# Patient Record
Sex: MALE | Race: WHITE | NOT HISPANIC OR LATINO | URBAN - METROPOLITAN AREA
[De-identification: names, ages, dates, MRNs, and addresses within clinical notes are randomized per-mention and may not be internally consistent; named-entity substitution may affect disease eponyms.]

---

## 2021-01-15 DIAGNOSIS — Z23 NEED FOR VACCINATION: ICD-10-CM

## 2023-01-30 ENCOUNTER — INPATIENT (INPATIENT)
Facility: HOSPITAL | Age: 78
LOS: 3 days | Discharge: NURSING, REHAB OR LONG-TERM CARE | DRG: 177 | End: 2023-02-03
Attending: INTERNAL MEDICINE | Admitting: INTERNAL MEDICINE
Payer: MEDICARE

## 2023-01-30 VITALS
SYSTOLIC BLOOD PRESSURE: 144 MMHG | TEMPERATURE: 98 F | DIASTOLIC BLOOD PRESSURE: 81 MMHG | RESPIRATION RATE: 18 BRPM | WEIGHT: 175.05 LBS | HEART RATE: 74 BPM | OXYGEN SATURATION: 100 % | HEIGHT: 70 IN

## 2023-01-30 DIAGNOSIS — J44.1 CHRONIC OBSTRUCTIVE PULMONARY DISEASE WITH (ACUTE) EXACERBATION: ICD-10-CM

## 2023-01-30 LAB
ALBUMIN SERPL ELPH-MCNC: 2 G/DL — LOW (ref 3.3–5)
ALP SERPL-CCNC: 62 U/L — SIGNIFICANT CHANGE UP (ref 40–120)
ALT FLD-CCNC: 26 U/L — SIGNIFICANT CHANGE UP (ref 12–78)
ANION GAP SERPL CALC-SCNC: 7 MMOL/L — SIGNIFICANT CHANGE UP (ref 5–17)
APTT BLD: 20.4 SEC — LOW (ref 27.5–35.5)
AST SERPL-CCNC: 21 U/L — SIGNIFICANT CHANGE UP (ref 15–37)
BASE EXCESS BLDV CALC-SCNC: -0.6 MMOL/L — SIGNIFICANT CHANGE UP
BASOPHILS # BLD AUTO: 0.07 K/UL — SIGNIFICANT CHANGE UP (ref 0–0.2)
BASOPHILS NFR BLD AUTO: 1 % — SIGNIFICANT CHANGE UP (ref 0–2)
BILIRUB SERPL-MCNC: 0.2 MG/DL — SIGNIFICANT CHANGE UP (ref 0.2–1.2)
BUN SERPL-MCNC: 64 MG/DL — HIGH (ref 7–23)
CALCIUM SERPL-MCNC: 8.8 MG/DL — SIGNIFICANT CHANGE UP (ref 8.5–10.1)
CHLORIDE SERPL-SCNC: 107 MMOL/L — SIGNIFICANT CHANGE UP (ref 96–108)
CO2 BLDV-SCNC: 29 MMOL/L — HIGH (ref 22–26)
CO2 SERPL-SCNC: 27 MMOL/L — SIGNIFICANT CHANGE UP (ref 22–31)
CREAT SERPL-MCNC: 5.82 MG/DL — HIGH (ref 0.5–1.3)
EGFR: 9 ML/MIN/1.73M2 — LOW
EOSINOPHIL # BLD AUTO: 0.15 K/UL — SIGNIFICANT CHANGE UP (ref 0–0.5)
EOSINOPHIL NFR BLD AUTO: 2 % — SIGNIFICANT CHANGE UP (ref 0–6)
FLUAV AG NPH QL: SIGNIFICANT CHANGE UP
FLUBV AG NPH QL: SIGNIFICANT CHANGE UP
GAS PNL BLDV: SIGNIFICANT CHANGE UP
GLUCOSE SERPL-MCNC: 96 MG/DL — SIGNIFICANT CHANGE UP (ref 70–99)
HCO3 BLDV-SCNC: 28 MMOL/L — SIGNIFICANT CHANGE UP (ref 22–29)
HCT VFR BLD CALC: 29.7 % — LOW (ref 39–50)
HGB BLD-MCNC: 8.7 G/DL — LOW (ref 13–17)
INR BLD: 1.03 RATIO — SIGNIFICANT CHANGE UP (ref 0.88–1.16)
LYMPHOCYTES # BLD AUTO: 0.66 K/UL — LOW (ref 1–3.3)
LYMPHOCYTES # BLD AUTO: 9 % — LOW (ref 13–44)
MAGNESIUM SERPL-MCNC: 2 MG/DL — SIGNIFICANT CHANGE UP (ref 1.6–2.6)
MCHC RBC-ENTMCNC: 29.3 GM/DL — LOW (ref 32–36)
MCHC RBC-ENTMCNC: 30 PG — SIGNIFICANT CHANGE UP (ref 27–34)
MCV RBC AUTO: 102.4 FL — HIGH (ref 80–100)
MONOCYTES # BLD AUTO: 0.37 K/UL — SIGNIFICANT CHANGE UP (ref 0–0.9)
MONOCYTES NFR BLD AUTO: 5 % — SIGNIFICANT CHANGE UP (ref 2–14)
NEUTROPHILS # BLD AUTO: 5.84 K/UL — SIGNIFICANT CHANGE UP (ref 1.8–7.4)
NEUTROPHILS NFR BLD AUTO: 80 % — HIGH (ref 43–77)
NRBC # BLD: SIGNIFICANT CHANGE UP /100 WBCS (ref 0–0)
NT-PROBNP SERPL-SCNC: HIGH PG/ML (ref 0–450)
PCO2 BLDV: 60 MMHG — HIGH (ref 42–55)
PH BLDV: 7.27 — LOW (ref 7.32–7.43)
PLATELET # BLD AUTO: 269 K/UL — SIGNIFICANT CHANGE UP (ref 150–400)
PO2 BLDV: 47 MMHG — SIGNIFICANT CHANGE UP
POTASSIUM SERPL-MCNC: 3.8 MMOL/L — SIGNIFICANT CHANGE UP (ref 3.5–5.3)
POTASSIUM SERPL-SCNC: 3.8 MMOL/L — SIGNIFICANT CHANGE UP (ref 3.5–5.3)
PROCALCITONIN SERPL-MCNC: 0.83 NG/ML — HIGH (ref 0.02–0.1)
PROT SERPL-MCNC: 5.9 GM/DL — LOW (ref 6–8.3)
PROTHROM AB SERPL-ACNC: 11.9 SEC — SIGNIFICANT CHANGE UP (ref 10.5–13.4)
RBC # BLD: 2.9 M/UL — LOW (ref 4.2–5.8)
RBC # FLD: 17.7 % — HIGH (ref 10.3–14.5)
RSV RNA NPH QL NAA+NON-PROBE: SIGNIFICANT CHANGE UP
SAO2 % BLDV: 78.8 % — SIGNIFICANT CHANGE UP
SARS-COV-2 RNA SPEC QL NAA+PROBE: DETECTED
SODIUM SERPL-SCNC: 141 MMOL/L — SIGNIFICANT CHANGE UP (ref 135–145)
TROPONIN I, HIGH SENSITIVITY RESULT: 25.88 NG/L — SIGNIFICANT CHANGE UP
WBC # BLD: 7.3 K/UL — SIGNIFICANT CHANGE UP (ref 3.8–10.5)
WBC # FLD AUTO: 7.3 K/UL — SIGNIFICANT CHANGE UP (ref 3.8–10.5)

## 2023-01-30 PROCEDURE — 85610 PROTHROMBIN TIME: CPT

## 2023-01-30 PROCEDURE — 97116 GAIT TRAINING THERAPY: CPT | Mod: GP

## 2023-01-30 PROCEDURE — 97163 PT EVAL HIGH COMPLEX 45 MIN: CPT | Mod: GP

## 2023-01-30 PROCEDURE — 99223 1ST HOSP IP/OBS HIGH 75: CPT

## 2023-01-30 PROCEDURE — 93010 ELECTROCARDIOGRAM REPORT: CPT

## 2023-01-30 PROCEDURE — 93970 EXTREMITY STUDY: CPT

## 2023-01-30 PROCEDURE — 93005 ELECTROCARDIOGRAM TRACING: CPT

## 2023-01-30 PROCEDURE — 71275 CT ANGIOGRAPHY CHEST: CPT

## 2023-01-30 PROCEDURE — 94640 AIRWAY INHALATION TREATMENT: CPT

## 2023-01-30 PROCEDURE — 99285 EMERGENCY DEPT VISIT HI MDM: CPT | Mod: CS

## 2023-01-30 PROCEDURE — 71045 X-RAY EXAM CHEST 1 VIEW: CPT | Mod: 26

## 2023-01-30 PROCEDURE — 90935 HEMODIALYSIS ONE EVALUATION: CPT

## 2023-01-30 PROCEDURE — 71045 X-RAY EXAM CHEST 1 VIEW: CPT

## 2023-01-30 PROCEDURE — 85730 THROMBOPLASTIN TIME PARTIAL: CPT

## 2023-01-30 PROCEDURE — 87635 SARS-COV-2 COVID-19 AMP PRB: CPT

## 2023-01-30 PROCEDURE — 85379 FIBRIN DEGRADATION QUANT: CPT

## 2023-01-30 PROCEDURE — 36415 COLL VENOUS BLD VENIPUNCTURE: CPT

## 2023-01-30 PROCEDURE — 87040 BLOOD CULTURE FOR BACTERIA: CPT

## 2023-01-30 PROCEDURE — 80069 RENAL FUNCTION PANEL: CPT

## 2023-01-30 PROCEDURE — 80048 BASIC METABOLIC PNL TOTAL CA: CPT

## 2023-01-30 PROCEDURE — 85027 COMPLETE CBC AUTOMATED: CPT

## 2023-01-30 RX ORDER — IPRATROPIUM/ALBUTEROL SULFATE 18-103MCG
3 AEROSOL WITH ADAPTER (GRAM) INHALATION ONCE
Refills: 0 | Status: COMPLETED | OUTPATIENT
Start: 2023-01-30 | End: 2023-01-30

## 2023-01-30 RX ORDER — HEPARIN SODIUM 5000 [USP'U]/ML
5000 INJECTION INTRAVENOUS; SUBCUTANEOUS EVERY 12 HOURS
Refills: 0 | Status: DISCONTINUED | OUTPATIENT
Start: 2023-01-30 | End: 2023-02-01

## 2023-01-30 RX ORDER — ACETAMINOPHEN 500 MG
650 TABLET ORAL EVERY 6 HOURS
Refills: 0 | Status: DISCONTINUED | OUTPATIENT
Start: 2023-01-30 | End: 2023-02-03

## 2023-01-30 RX ORDER — MAGNESIUM SULFATE 500 MG/ML
1 VIAL (ML) INJECTION ONCE
Refills: 0 | Status: COMPLETED | OUTPATIENT
Start: 2023-01-30 | End: 2023-01-30

## 2023-01-30 RX ORDER — METOPROLOL TARTRATE 50 MG
100 TABLET ORAL DAILY
Refills: 0 | Status: DISCONTINUED | OUTPATIENT
Start: 2023-01-30 | End: 2023-02-03

## 2023-01-30 RX ORDER — ONDANSETRON 8 MG/1
4 TABLET, FILM COATED ORAL EVERY 8 HOURS
Refills: 0 | Status: DISCONTINUED | OUTPATIENT
Start: 2023-01-30 | End: 2023-02-03

## 2023-01-30 RX ORDER — MIRTAZAPINE 45 MG/1
7.5 TABLET, ORALLY DISINTEGRATING ORAL AT BEDTIME
Refills: 0 | Status: DISCONTINUED | OUTPATIENT
Start: 2023-01-30 | End: 2023-02-03

## 2023-01-30 RX ORDER — LEVOTHYROXINE SODIUM 125 MCG
25 TABLET ORAL DAILY
Refills: 0 | Status: DISCONTINUED | OUTPATIENT
Start: 2023-01-30 | End: 2023-02-03

## 2023-01-30 RX ORDER — CEFTRIAXONE 500 MG/1
1000 INJECTION, POWDER, FOR SOLUTION INTRAMUSCULAR; INTRAVENOUS ONCE
Refills: 0 | Status: DISCONTINUED | OUTPATIENT
Start: 2023-01-30 | End: 2023-01-30

## 2023-01-30 RX ORDER — HYDRALAZINE HCL 50 MG
1 TABLET ORAL
Qty: 0 | Refills: 0 | DISCHARGE

## 2023-01-30 RX ORDER — TAMSULOSIN HYDROCHLORIDE 0.4 MG/1
0.4 CAPSULE ORAL AT BEDTIME
Refills: 0 | Status: DISCONTINUED | OUTPATIENT
Start: 2023-01-30 | End: 2023-02-03

## 2023-01-30 RX ORDER — CEFEPIME 1 G/1
1000 INJECTION, POWDER, FOR SOLUTION INTRAMUSCULAR; INTRAVENOUS ONCE
Refills: 0 | Status: DISCONTINUED | OUTPATIENT
Start: 2023-01-30 | End: 2023-01-30

## 2023-01-30 RX ORDER — VANCOMYCIN HCL 1 G
1250 VIAL (EA) INTRAVENOUS ONCE
Refills: 0 | Status: COMPLETED | OUTPATIENT
Start: 2023-01-30 | End: 2023-01-30

## 2023-01-30 RX ORDER — AZITHROMYCIN 500 MG/1
500 TABLET, FILM COATED ORAL ONCE
Refills: 0 | Status: COMPLETED | OUTPATIENT
Start: 2023-01-30 | End: 2023-01-30

## 2023-01-30 RX ORDER — ATORVASTATIN CALCIUM 80 MG/1
40 TABLET, FILM COATED ORAL AT BEDTIME
Refills: 0 | Status: DISCONTINUED | OUTPATIENT
Start: 2023-01-30 | End: 2023-02-03

## 2023-01-30 RX ORDER — CITALOPRAM 10 MG/1
20 TABLET, FILM COATED ORAL DAILY
Refills: 0 | Status: DISCONTINUED | OUTPATIENT
Start: 2023-01-30 | End: 2023-02-03

## 2023-01-30 RX ORDER — LEVOTHYROXINE SODIUM 125 MCG
1 TABLET ORAL
Qty: 0 | Refills: 0 | DISCHARGE

## 2023-01-30 RX ORDER — FLUTICASONE FUROATE AND VILANTEROL TRIFENATATE 100; 25 UG/1; UG/1
1 POWDER RESPIRATORY (INHALATION)
Qty: 0 | Refills: 0 | DISCHARGE

## 2023-01-30 RX ORDER — CITALOPRAM 10 MG/1
1 TABLET, FILM COATED ORAL
Qty: 0 | Refills: 0 | DISCHARGE

## 2023-01-30 RX ORDER — CEFEPIME 1 G/1
1000 INJECTION, POWDER, FOR SOLUTION INTRAMUSCULAR; INTRAVENOUS ONCE
Refills: 0 | Status: COMPLETED | OUTPATIENT
Start: 2023-01-30 | End: 2023-01-30

## 2023-01-30 RX ORDER — ALBUTEROL 90 UG/1
2 AEROSOL, METERED ORAL
Qty: 0 | Refills: 0 | DISCHARGE

## 2023-01-30 RX ORDER — PANTOPRAZOLE SODIUM 20 MG/1
40 TABLET, DELAYED RELEASE ORAL
Refills: 0 | Status: DISCONTINUED | OUTPATIENT
Start: 2023-01-30 | End: 2023-02-03

## 2023-01-30 RX ORDER — TIOTROPIUM BROMIDE 18 UG/1
2 CAPSULE ORAL; RESPIRATORY (INHALATION) DAILY
Refills: 0 | Status: DISCONTINUED | OUTPATIENT
Start: 2023-01-30 | End: 2023-02-03

## 2023-01-30 RX ORDER — ALBUTEROL 90 UG/1
2 AEROSOL, METERED ORAL EVERY 6 HOURS
Refills: 0 | Status: DISCONTINUED | OUTPATIENT
Start: 2023-01-30 | End: 2023-02-03

## 2023-01-30 RX ORDER — HYDRALAZINE HCL 50 MG
50 TABLET ORAL THREE TIMES A DAY
Refills: 0 | Status: DISCONTINUED | OUTPATIENT
Start: 2023-01-30 | End: 2023-02-03

## 2023-01-30 RX ORDER — LANOLIN ALCOHOL/MO/W.PET/CERES
3 CREAM (GRAM) TOPICAL AT BEDTIME
Refills: 0 | Status: DISCONTINUED | OUTPATIENT
Start: 2023-01-30 | End: 2023-02-03

## 2023-01-30 RX ORDER — TIOTROPIUM BROMIDE 18 UG/1
1 CAPSULE ORAL; RESPIRATORY (INHALATION)
Qty: 0 | Refills: 0 | DISCHARGE

## 2023-01-30 RX ORDER — PANTOPRAZOLE SODIUM 20 MG/1
1 TABLET, DELAYED RELEASE ORAL
Qty: 0 | Refills: 0 | DISCHARGE

## 2023-01-30 RX ADMIN — CEFEPIME 1000 MILLIGRAM(S): 1 INJECTION, POWDER, FOR SOLUTION INTRAMUSCULAR; INTRAVENOUS at 13:29

## 2023-01-30 RX ADMIN — Medication 100 GRAM(S): at 11:02

## 2023-01-30 RX ADMIN — AZITHROMYCIN 255 MILLIGRAM(S): 500 TABLET, FILM COATED ORAL at 13:30

## 2023-01-30 RX ADMIN — Medication 125 MILLIGRAM(S): at 11:02

## 2023-01-30 RX ADMIN — HEPARIN SODIUM 5000 UNIT(S): 5000 INJECTION INTRAVENOUS; SUBCUTANEOUS at 21:08

## 2023-01-30 RX ADMIN — ATORVASTATIN CALCIUM 40 MILLIGRAM(S): 80 TABLET, FILM COATED ORAL at 21:08

## 2023-01-30 RX ADMIN — Medication 3 MILLIGRAM(S): at 23:00

## 2023-01-30 RX ADMIN — MIRTAZAPINE 7.5 MILLIGRAM(S): 45 TABLET, ORALLY DISINTEGRATING ORAL at 23:00

## 2023-01-30 RX ADMIN — Medication 3 MILLILITER(S): at 11:45

## 2023-01-30 RX ADMIN — Medication 3 MILLILITER(S): at 11:02

## 2023-01-30 RX ADMIN — Medication 3 MILLILITER(S): at 11:25

## 2023-01-30 RX ADMIN — TAMSULOSIN HYDROCHLORIDE 0.4 MILLIGRAM(S): 0.4 CAPSULE ORAL at 23:00

## 2023-01-30 NOTE — ED PROVIDER NOTE - NSICDXPASTMEDICALHX_GEN_ALL_CORE_FT
PAST MEDICAL HISTORY:  CAD (coronary artery disease)     COPD (chronic obstructive pulmonary disease)     ESRD on dialysis

## 2023-01-30 NOTE — ED PROVIDER NOTE - PHYSICAL EXAMINATION
GENERAL: non-toxic appearing, in NAD  HEAD: atraumatic, normocephalic  EYES: vision grossly intact, no conjunctivitis or discharge  EARS: hearing grossly intact  NOSE: no nasal discharge, epistaxis   CARDIAC: RRR, normal S1S2,  no appreciable murmurs, no cyanosis, cap refill < 2 seconds  PULM: no respiratory distress, oxygen saturation on RA wnl, no crackles, rales, rhonchi, or wheezing. Poor movement of air.  GI: abdomen nondistended, soft, nontender, no guarding or rebound tenderness, no palpable masses  NEURO: awake and alert, follows commands, normal speech, PERRLA, EOMI, no focal motor or sensory deficits, normal gait  MSK: spine appears normal, no joint swelling or erythema, no gross deformities of extremities  EXT: no peripheral edema, calf tenderness, redness or swelling  SKIN: warm, dry, and intact, no rashes  PSYCH: appropriate mood and affect

## 2023-01-30 NOTE — H&P ADULT - NSHPPHYSICALEXAM_GEN_ALL_CORE
Vital Signs Last 24 Hrs  T(C): 36.4 (30 Jan 2023 10:17), Max: 36.4 (30 Jan 2023 10:17)  T(F): 97.5 (30 Jan 2023 10:17), Max: 97.5 (30 Jan 2023 10:17)  HR: 74 (30 Jan 2023 10:17) (74 - 74)  BP: 144/81 (30 Jan 2023 10:17) (144/81 - 144/81)  BP(mean): --  RR: 18 (30 Jan 2023 10:17) (18 - 18)  SpO2: 100% (30 Jan 2023 10:17) (100% - 100%)    Parameters below as of 30 Jan 2023 10:17  Patient On (Oxygen Delivery Method): mask, nonrebreather  O2 Flow (L/min): 8    PHYSICAL EXAM:      Constitutional: NAD  Eyes: perrl, no conjunctival changes  ENMT: no exudates, moist oral muc, uvula midline  Neck: no JVD, no LAD  Back: no cva tenderness  Respiratory: CTA, + rhonci, bibasilar crackles  Cardiovascular: S1S2 reg, no murmur gallop or rub  Gastrointestinal: abd soft, NT/ND + BS  Genitourinary: voiding  Extremities: FROM, no joint effusions, no edema, no clubbing , no cyanosis  Vascular: pedal pulses + bilateral, warm extremities  Neurological: non focal, mot str 5/5/ all extr  Skin: no rashes  Lymph Nodes: no LAD

## 2023-01-30 NOTE — PHARMACOTHERAPY INTERVENTION NOTE - COMMENTS
Medication history complete, patient is from [facility], reviewed and confirmed medication with list provided by facility.

## 2023-01-30 NOTE — ED PROVIDER NOTE - OBJECTIVE STATEMENT
78 y/o male with a PMHx of CAD s/p stents x4, COPD, ESRD on dialysis M/W/F presents to the ED [place] from [facility] c/o SOB starting this morning. +runny nose. Denies cough, CP, fevers, chills. Former smoker. No other complaints at this time.

## 2023-01-30 NOTE — H&P ADULT - ASSESSMENT
* SOB multifactorial ESRD, COVID, pneumonia  s/p IV broad spectrum  for HD today consult nephrology  ID to advise on covid Rx if any    * COPD  on home O2  Albuterol  Pulm consult  he was tapered off roids last dose was supposed to be 2/1; consult pulm    * HTN  on high dose BB at home, hydralazine    dnr dni

## 2023-01-30 NOTE — ED PROVIDER NOTE - NS ED ROS FT
GENERAL: no fever, chills, fatigue, weight loss, night sweats  HEENT: no eye pain, discharge, conjunctivitis, ear pain, hearing loss, congestion, throat pain. +runny nose  CARDIAC: no chest pain, palpitations, lightheadedness, syncope  PULM: no wheezing. +SOB  GI: no abdominal pain, nausea, vomiting, diarrhea, constipation, melena, hematochezia  : no urinary dysuria, frequency, incontinence, hematuria  NEURO: no headache, changes in vision, motor weakness, sensory changes  MSK: no joint pain, joint swelling, myalgias  SKIN: no rashes  HEME: no active bleeding, excessive bruising

## 2023-01-30 NOTE — H&P ADULT - HISTORY OF PRESENT ILLNESS
76 y/o male with a PMHx of CAD s/p stents x4, COPD, ESRD on dialysis M/W/F presents to the ED BIBA from [facility] c/o SOB starting this morning. +runny nose. Denies cough, CP, fevers, chills. Former smoker and with O2 at the NH. He has abnormal CXR and tested + for covid, received broad spectrum abx in ed; adm for further Rx. MOLST done dnr dni

## 2023-01-30 NOTE — ED ADULT NURSE NOTE - CHIEF COMPLAINT QUOTE
patient brought in by EMS from [facility] c/o shortness of breath.  reports symptoms started this morning.  recently hospitalized for pneumonia.  hx ESRD - M/W/F, COPD.

## 2023-01-30 NOTE — CONSULT NOTE ADULT - SUBJECTIVE AND OBJECTIVE BOX
NEPHROLOGY INTERVAL HPI/OVERNIGHT EVENTS:  JULIO [id number]  HPI:  76 y/o male with a PMHx of CAD s/p stents x4, COPD, ESRD on dialysis M/W/F presents to the ED [place] from [facility] c/o SOB starting this morning. +runny nose. Denies cough, CP, fevers, chills. Former smoker and with O2 at the NH. He has abnormal CXR and tested + for covid, received broad spectrum abx in ed; adm for further Rx. MOLST done dnr dni (30 Jan 2023 13:09)      Patient is a 77y old  Male who presents with a chief complaint of sob (30 Jan 2023 13:09)  ----  pt poor historian   has hx ofesrd for past 8 years ( doesnot know his nephro) hd at [facility]   recent admission to hospt for Suicide attempt with attempt of cuttting his AVF site  now hd via TDC   send from [facility] with sob  with hx of severe copd on o2       PAST MEDICAL & SURGICAL HISTORY:  -esrd mwf [facility] unit now tdc   -suicide attempt with cutting of avf site  - COPD  - CAD            FAMILY HISTORY:      MEDICATIONS  (STANDING):  atorvastatin 40 milliGRAM(s) Oral at bedtime  azithromycin  IVPB 500 milliGRAM(s) IV Intermittent once  cefepime  Injectable. 1000 milliGRAM(s) IV Push once  citalopram 20 milliGRAM(s) Oral daily  heparin   Injectable 5000 Unit(s) SubCutaneous every 12 hours  hydrALAZINE 50 milliGRAM(s) Oral three times a day  levothyroxine 25 MICROGram(s) Oral daily  metoprolol succinate  milliGRAM(s) Oral daily  mirtazapine 7.5 milliGRAM(s) Oral at bedtime  pantoprazole    Tablet 40 milliGRAM(s) Oral before breakfast  predniSONE   Tablet 10 milliGRAM(s) Oral daily  tamsulosin 0.4 milliGRAM(s) Oral at bedtime  tiotropium 2.5 MICROgram(s) Inhaler 2 Puff(s) Inhalation daily  vancomycin  IVPB. 1250 milliGRAM(s) IV Intermittent once    MEDICATIONS  (PRN):  acetaminophen     Tablet .. 650 milliGRAM(s) Oral every 6 hours PRN Temp greater or equal to 38C (100.4F), Mild Pain (1 - 3)  albuterol    90 MICROgram(s) HFA Inhaler 2 Puff(s) Inhalation every 6 hours PRN for shortness of breath and/or wheezing  aluminum hydroxide/magnesium hydroxide/simethicone Suspension 30 milliLiter(s) Oral every 4 hours PRN Dyspepsia  melatonin 3 milliGRAM(s) Oral at bedtime PRN Insomnia  ondansetron Injectable 4 milliGRAM(s) IV Push every 8 hours PRN Nausea and/or Vomiting      Allergies    No Known Allergies    Intolerances        I&O's Summary      Home Medications:  Albuterol (Eqv-Proventil HFA) 90 mcg/inh inhalation aerosol: 2 puff(s) inhaled every 6 hours, As Needed - for shortness of breath and/or wheezing (30 Jan 2023 12:02)  atorvastatin 40 mg oral tablet: 1 tab(s) orally once a day (in the evening) (30 Jan 2023 12:02)  bisacodyl 10 mg rectal suppository: 1 suppository(ies) rectally once a day, As Needed if no results from MOM (30 Jan 2023 12:02)  Breo Ellipta 200 mcg-25 mcg/inh inhalation powder: 1 puff(s) inhaled once a day (30 Jan 2023 12:02)  citalopram 20 mg oral tablet: 1 tab(s) orally once a day (30 Jan 2023 12:02)  Fleet Enema 19 g-7 g rectal enema: 133 milliliter(s) rectal once if no results from Dulcolax (30 Jan 2023 12:02)  hydrALAZINE 50 mg oral tablet: 1 tab(s) orally 3 times a day (30 Jan 2023 12:02)  levothyroxine 25 mcg (0.025 mg) oral tablet: 1 tab(s) orally once a day (30 Jan 2023 12:02)  Metoprolol Succinate  mg oral tablet, extended release: 1 tab(s) orally once a day (30 Jan 2023 12:02)  Milk of Magnesia 8% oral suspension: 30 milliliter(s) orally once a day (at bedtime), As Needed - for constipation (30 Jan 2023 12:02)  mirtazapine 7.5 mg oral tablet: 1 tab(s) orally once a day (at bedtime) (30 Jan 2023 12:02)  Multiple Vitamins oral tablet: 1 tab(s) orally once a day (30 Jan 2023 12:02)  pantoprazole 40 mg oral delayed release tablet: 1 tab(s) orally once a day (30 Jan 2023 12:02)  predniSONE 10 mg oral tablet: 1 tab(s) orally once a day until 2/1 (30 Jan 2023 12:02)  Spiriva 18 mcg inhalation capsule: 1 cap(s) inhaled once a day (30 Jan 2023 12:02)  tamsulosin 0.4 mg oral capsule: 1 cap(s) orally once a day (at bedtime) (30 Jan 2023 12:02)      Patient was seen and evaluated on dialysis.   Patient is tolerating the procedure well.   Continue dialysis:   Dialyzer:          QB:        QD:   Goal UF ___ over ___ Hours       Vital Signs Last 24 Hrs  T(C): 36.4 (30 Jan 2023 10:17), Max: 36.4 (30 Jan 2023 10:17)  T(F): 97.5 (30 Jan 2023 10:17), Max: 97.5 (30 Jan 2023 10:17)  HR: 74 (30 Jan 2023 10:17) (74 - 74)  BP: 144/81 (30 Jan 2023 10:17) (144/81 - 144/81)  BP(mean): --  RR: 18 (30 Jan 2023 10:17) (18 - 18)  SpO2: 100% (30 Jan 2023 10:17) (100% - 100%)    Parameters below as of 30 Jan 2023 10:17  Patient On (Oxygen Delivery Method): mask, nonrebreather  O2 Flow (L/min): 8    Daily Height in cm: 177.8 (30 Jan 2023 10:17)    Daily   I&O's Summary      PHYSICAL EXAM:  GEN: alert awake NAD  HEENT: MMM  NECK supple no jvd  CV: RRR s1s2  LUNGS: b/l CTA  ABD: + bs soft,   EXT: no edema    LABS:                        8.7    7.30  )-----------( 269      ( 30 Jan 2023 10:43 )             29.7     01-30    141  |  107  |  64<H>  ----------------------------<  96  3.8   |  27  |  5.82<H>    Ca    8.8      30 Jan 2023 10:43  Phos  3.7     01-30  Mg     2.0     01-30    TPro  5.9<L>  /  Alb  2.0<L>  /  TBili  0.2  /  DBili  x   /  AST  21  /  ALT  26  /  AlkPhos  62  01-30    PT/INR - ( 30 Jan 2023 10:43 )   PT: 11.9 sec;   INR: 1.03 ratio         PTT - ( 30 Jan 2023 10:43 )  PTT:20.4 sec    Magnesium, Serum: 2.0 mg/dL (01-30 @ 10:43)  Phosphorus Level, Serum: 3.7 mg/dL (01-30 @ 10:43)

## 2023-01-30 NOTE — ED ADULT TRIAGE NOTE - CHIEF COMPLAINT QUOTE
patient brought in by EMS from [facility] c/o shortness of breath.  reports symptoms started this morning.  recently hospitalized for pneumonia.  hx ESRD - M/W/F, COPD.

## 2023-01-30 NOTE — ED ADULT NURSE NOTE - OBJECTIVE STATEMENT
patient brought in by EMS from [facility] c/o shortness of breath.  reports symptoms started this morning.  recently hospitalized for pneumonia.  hx ESRD - M/W/F, COPD. Pt

## 2023-01-30 NOTE — ED PROVIDER NOTE - NSCAREINITIATED _GEN_ER
What Type Of Note Output Would You Prefer (Optional)?: Bullet Format How Severe Are Your Spot(S)?: mild Have Your Spot(S) Been Treated In The Past?: has not been treated Hpi Title: Evaluation of Skin Lesions Nicanor Fisher(Attending)

## 2023-01-30 NOTE — CONSULT NOTE ADULT - ASSESSMENT
76 y/o male with a PMHx of CAD s/p stents x4, COPD, ESRD on dialysis M/W/F presents to the ED BIBA from [facility] c/o SOB starting this morning. +runny nose. Denies cough, CP, fevers, chills. Former smoker and with O2 at the   esrd mwf at [place] unit   cxr with CHF admitted with COPD exaceb with hx of severe COPD on o2 at home   hx of suicide attempt with cutting of avf recently   covid pos    REC  - hd today for 3.5 hrs via tdc with inc uf with hd   - anemia nehemiah with hd

## 2023-01-30 NOTE — H&P ADULT - NSHPLABSRESULTS_GEN_ALL_CORE
8.7    7.30  )-----------( 269      ( 30 Jan 2023 10:43 )             29.7   01-30    141  |  107  |  64<H>  ----------------------------<  96  3.8   |  27  |  5.82<H>    Ca    8.8      30 Jan 2023 10:43  Phos  3.7     01-30  Mg     2.0     01-30    TPro  5.9<L>  /  Alb  2.0<L>  /  TBili  0.2  /  DBili  x   /  AST  21  /  ALT  26  /  AlkPhos  62  01-30      no ekg  cxr bibasilar infiltrates

## 2023-01-30 NOTE — ED PROVIDER NOTE - CLINICAL SUMMARY MEDICAL DECISION MAKING FREE TEXT BOX
Concern for COPD exacerbation. Will give nebs, steroids, hold off on abx as pt afebrile. Pt likely to be admitted.

## 2023-01-31 LAB
-  COAGULASE NEGATIVE STAPHYLOCOCCUS: SIGNIFICANT CHANGE UP
ANION GAP SERPL CALC-SCNC: 4 MMOL/L — LOW (ref 5–17)
BUN SERPL-MCNC: 40 MG/DL — HIGH (ref 7–23)
CALCIUM SERPL-MCNC: 8.7 MG/DL — SIGNIFICANT CHANGE UP (ref 8.5–10.1)
CHLORIDE SERPL-SCNC: 105 MMOL/L — SIGNIFICANT CHANGE UP (ref 96–108)
CO2 SERPL-SCNC: 28 MMOL/L — SIGNIFICANT CHANGE UP (ref 22–31)
CREAT SERPL-MCNC: 3.75 MG/DL — HIGH (ref 0.5–1.3)
D DIMER BLD IA.RAPID-MCNC: 2483 NG/ML DDU — HIGH
EGFR: 16 ML/MIN/1.73M2 — LOW
GLUCOSE SERPL-MCNC: 107 MG/DL — HIGH (ref 70–99)
GRAM STN FLD: SIGNIFICANT CHANGE UP
HAV IGM SER-ACNC: SIGNIFICANT CHANGE UP
HBV CORE IGM SER-ACNC: SIGNIFICANT CHANGE UP
HBV SURFACE AG SER-ACNC: SIGNIFICANT CHANGE UP
HCT VFR BLD CALC: 31.4 % — LOW (ref 39–50)
HCV AB S/CO SERPL IA: 0.09 S/CO — SIGNIFICANT CHANGE UP (ref 0–0.99)
HCV AB SERPL-IMP: SIGNIFICANT CHANGE UP
HGB BLD-MCNC: 9.1 G/DL — LOW (ref 13–17)
MANUAL SMEAR VERIFICATION: SIGNIFICANT CHANGE UP
MCHC RBC-ENTMCNC: 29 GM/DL — LOW (ref 32–36)
MCHC RBC-ENTMCNC: 29.7 PG — SIGNIFICANT CHANGE UP (ref 27–34)
MCV RBC AUTO: 102.6 FL — HIGH (ref 80–100)
METHOD TYPE: SIGNIFICANT CHANGE UP
PLAT MORPH BLD: SIGNIFICANT CHANGE UP
PLATELET # BLD AUTO: 253 K/UL — SIGNIFICANT CHANGE UP (ref 150–400)
POTASSIUM SERPL-MCNC: 4.6 MMOL/L — SIGNIFICANT CHANGE UP (ref 3.5–5.3)
POTASSIUM SERPL-SCNC: 4.6 MMOL/L — SIGNIFICANT CHANGE UP (ref 3.5–5.3)
RBC # BLD: 3.06 M/UL — LOW (ref 4.2–5.8)
RBC # FLD: 18.1 % — HIGH (ref 10.3–14.5)
RBC BLD AUTO: SIGNIFICANT CHANGE UP
SODIUM SERPL-SCNC: 137 MMOL/L — SIGNIFICANT CHANGE UP (ref 135–145)
WBC # BLD: 6.66 K/UL — SIGNIFICANT CHANGE UP (ref 3.8–10.5)
WBC # FLD AUTO: 6.66 K/UL — SIGNIFICANT CHANGE UP (ref 3.8–10.5)

## 2023-01-31 PROCEDURE — 99233 SBSQ HOSP IP/OBS HIGH 50: CPT

## 2023-01-31 RX ORDER — BUDESONIDE AND FORMOTEROL FUMARATE DIHYDRATE 160; 4.5 UG/1; UG/1
2 AEROSOL RESPIRATORY (INHALATION)
Refills: 0 | Status: DISCONTINUED | OUTPATIENT
Start: 2023-01-31 | End: 2023-02-03

## 2023-01-31 RX ADMIN — Medication 3 MILLIGRAM(S): at 21:45

## 2023-01-31 RX ADMIN — HEPARIN SODIUM 5000 UNIT(S): 5000 INJECTION INTRAVENOUS; SUBCUTANEOUS at 11:11

## 2023-01-31 RX ADMIN — CITALOPRAM 20 MILLIGRAM(S): 10 TABLET, FILM COATED ORAL at 11:11

## 2023-01-31 RX ADMIN — Medication 10 MILLIGRAM(S): at 11:11

## 2023-01-31 RX ADMIN — Medication 25 MICROGRAM(S): at 06:17

## 2023-01-31 RX ADMIN — HEPARIN SODIUM 5000 UNIT(S): 5000 INJECTION INTRAVENOUS; SUBCUTANEOUS at 21:44

## 2023-01-31 RX ADMIN — PANTOPRAZOLE SODIUM 40 MILLIGRAM(S): 20 TABLET, DELAYED RELEASE ORAL at 06:17

## 2023-01-31 RX ADMIN — ATORVASTATIN CALCIUM 40 MILLIGRAM(S): 80 TABLET, FILM COATED ORAL at 21:44

## 2023-01-31 RX ADMIN — Medication 50 MILLIGRAM(S): at 06:17

## 2023-01-31 RX ADMIN — Medication 50 MILLIGRAM(S): at 15:00

## 2023-01-31 RX ADMIN — TAMSULOSIN HYDROCHLORIDE 0.4 MILLIGRAM(S): 0.4 CAPSULE ORAL at 21:44

## 2023-01-31 RX ADMIN — Medication 100 MILLIGRAM(S): at 11:11

## 2023-01-31 RX ADMIN — MIRTAZAPINE 7.5 MILLIGRAM(S): 45 TABLET, ORALLY DISINTEGRATING ORAL at 21:45

## 2023-01-31 NOTE — PROVIDER CONTACT NOTE (CRITICAL VALUE NOTIFICATION) - SITUATION
message left. awaiting return call message left. awaiting return call. [address]IE Henson returned call. Made aware of blood culture results.  Will review chart. Await orders

## 2023-01-31 NOTE — DIETITIAN NUTRITION RISK NOTIFICATION - TREATMENT: THE FOLLOWING DIET HAS BEEN RECOMMENDED
Diet, Renal Restrictions:   For patients receiving Renal Replacement - No Protein Restr, No Conc K, No Conc Phos, Low Sodium (01-30-23 @ 23:03) [Active]

## 2023-01-31 NOTE — DIETITIAN INITIAL EVALUATION ADULT - NS FNS DIET ORDER
Diet, Renal Restrictions:   For patients receiving Renal Replacement - No Protein Restr, No Conc K, No Conc Phos, Low Sodium (01-30-23 @ 23:03)

## 2023-01-31 NOTE — CONSULT NOTE ADULT - ASSESSMENT
76 y/o male with h/o CAD s/p stents x 4, COPD on home O2, ESRD on dialysis M/W/F was admitted on 1/30 from [facility] for worsening SOB x one day associated with runny nose. Denies fevers, chills. In ER she tested positive for COVID and received broad spectrum IV abx.     1. Acute on chronic respiratory failure. Multifocal pneumonia. CHF exacerbation. ESRD on HD. COPD exacerbation.  -s/p cefepime and azithromycin in ER  -respiratory frail  -she is not eligible for remdesivir therapy due to poor renal function  -no need for abx therpay at present time  -O2 therapy  -steroids  -AC  -droplet isolation  -respiratory care  -old chart reviewed to assess prior cultures  -monitor temps  -f/u CBC  -supportive care  2. Other issues:   -care per medicine

## 2023-01-31 NOTE — CHART NOTE - NSCHARTNOTEFT_GEN_A_CORE
Called by RN to report positive blood culture: Gram positive cocci in clusters in aerobic bottle   Will repeat blood cultures  Vanco x 1 dose  ID consult for antibiotic management Called by RN to report positive blood culture: Gram positive cocci in clusters in aerobic bottle   Will repeat blood cultures- ? contamination   Already had Zithro, Cefepime and Vanco x 1  Will consult ID for antibiotic management

## 2023-01-31 NOTE — DIETITIAN INITIAL EVALUATION ADULT - PERTINENT MEDS FT
MEDICATIONS  (STANDING):  atorvastatin 40 milliGRAM(s) Oral at bedtime  citalopram 20 milliGRAM(s) Oral daily  heparin   Injectable 5000 Unit(s) SubCutaneous every 12 hours  hydrALAZINE 50 milliGRAM(s) Oral three times a day  levothyroxine 25 MICROGram(s) Oral daily  metoprolol succinate  milliGRAM(s) Oral daily  mirtazapine 7.5 milliGRAM(s) Oral at bedtime  pantoprazole    Tablet 40 milliGRAM(s) Oral before breakfast  predniSONE   Tablet 10 milliGRAM(s) Oral daily  tamsulosin 0.4 milliGRAM(s) Oral at bedtime  tiotropium 2.5 MICROgram(s) Inhaler 2 Puff(s) Inhalation daily    MEDICATIONS  (PRN):  acetaminophen     Tablet .. 650 milliGRAM(s) Oral every 6 hours PRN Temp greater or equal to 38C (100.4F), Mild Pain (1 - 3)  albuterol    90 MICROgram(s) HFA Inhaler 2 Puff(s) Inhalation every 6 hours PRN for shortness of breath and/or wheezing  aluminum hydroxide/magnesium hydroxide/simethicone Suspension 30 milliLiter(s) Oral every 4 hours PRN Dyspepsia  melatonin 3 milliGRAM(s) Oral at bedtime PRN Insomnia  ondansetron Injectable 4 milliGRAM(s) IV Push every 8 hours PRN Nausea and/or Vomiting    Home Medications:  Albuterol (Eqv-Proventil HFA) 90 mcg/inh inhalation aerosol: 2 puff(s) inhaled every 6 hours, As Needed - for shortness of breath and/or wheezing (30 Jan 2023 12:02)  atorvastatin 40 mg oral tablet: 1 tab(s) orally once a day (in the evening) (30 Jan 2023 12:02)  bisacodyl 10 mg rectal suppository: 1 suppository(ies) rectally once a day, As Needed if no results from MOM (30 Jan 2023 12:02)  Breo Ellipta 200 mcg-25 mcg/inh inhalation powder: 1 puff(s) inhaled once a day (30 Jan 2023 12:02)  citalopram 20 mg oral tablet: 1 tab(s) orally once a day (30 Jan 2023 12:02)  Fleet Enema 19 g-7 g rectal enema: 133 milliliter(s) rectal once if no results from Dulcolax (30 Jan 2023 12:02)  hydrALAZINE 50 mg oral tablet: 1 tab(s) orally 3 times a day (30 Jan 2023 12:02)  levothyroxine 25 mcg (0.025 mg) oral tablet: 1 tab(s) orally once a day (30 Jan 2023 12:02)  Metoprolol Succinate  mg oral tablet, extended release: 1 tab(s) orally once a day (30 Jan 2023 12:02)  Milk of Magnesia 8% oral suspension: 30 milliliter(s) orally once a day (at bedtime), As Needed - for constipation (30 Jan 2023 12:02)  mirtazapine 7.5 mg oral tablet: 1 tab(s) orally once a day (at bedtime) (30 Jan 2023 12:02)  Multiple Vitamins oral tablet: 1 tab(s) orally once a day (30 Jan 2023 12:02)  pantoprazole 40 mg oral delayed release tablet: 1 tab(s) orally once a day (30 Jan 2023 12:02)  predniSONE 10 mg oral tablet: 1 tab(s) orally once a day until 2/1 (30 Jan 2023 12:02)  Spiriva 18 mcg inhalation capsule: 1 cap(s) inhaled once a day (30 Jan 2023 12:02)  tamsulosin 0.4 mg oral capsule: 1 cap(s) orally once a day (at bedtime) (30 Jan 2023 12:02)

## 2023-01-31 NOTE — CONSULT NOTE ADULT - SUBJECTIVE AND OBJECTIVE BOX
Patient is a 77y old  Male who presents with a chief complaint of Chronic obstructive pulmonary disease with acute exacerbation    HPI:  76 y/o male with h/o CAD s/p stents x 4, COPD on home O2, ESRD on dialysis M/W/F was admitted on 1/30 from [facility] for worsening SOB x one day associated with runny nose. Denies fevers, chills. In ER she tested positive for COVID and received broad spectrum IV abx.       PMH: as above  PSH: as above  Meds: per reconciliation sheet, noted below  MEDICATIONS  (STANDING):  atorvastatin 40 milliGRAM(s) Oral at bedtime  budesonide 160 MICROgram(s)/formoterol 4.5 MICROgram(s) Inhaler 2 Puff(s) Inhalation two times a day  citalopram 20 milliGRAM(s) Oral daily  heparin   Injectable 5000 Unit(s) SubCutaneous every 12 hours  hydrALAZINE 50 milliGRAM(s) Oral three times a day  levothyroxine 25 MICROGram(s) Oral daily  metoprolol succinate  milliGRAM(s) Oral daily  mirtazapine 7.5 milliGRAM(s) Oral at bedtime  pantoprazole    Tablet 40 milliGRAM(s) Oral before breakfast  predniSONE   Tablet 10 milliGRAM(s) Oral daily  tamsulosin 0.4 milliGRAM(s) Oral at bedtime  tiotropium 2.5 MICROgram(s) Inhaler 2 Puff(s) Inhalation daily    MEDICATIONS  (PRN):  acetaminophen     Tablet .. 650 milliGRAM(s) Oral every 6 hours PRN Temp greater or equal to 38C (100.4F), Mild Pain (1 - 3)  albuterol    90 MICROgram(s) HFA Inhaler 2 Puff(s) Inhalation every 6 hours PRN for shortness of breath and/or wheezing  melatonin 3 milliGRAM(s) Oral at bedtime PRN Insomnia  ondansetron Injectable 4 milliGRAM(s) IV Push every 8 hours PRN Nausea and/or Vomiting    Allergies    No Known Allergies    Intolerances      Social: prior smoking, no alcohol, no illegal drugs; no recent travel, no exposure to TB  FAMILY HISTORY:    no history of premature cardiovascular disease in first degree relatives    ROS: the patient denies fever, no chills, no HA, no seizures, no dizziness, no sore throat, no nasal congestion, no blurry vision, no CP, no palpitations, has SOB, has cough, no abdominal pain, no diarrhea, no N/V, no dysuria, no leg pain, no claudication, no rash, no joint aches, no rectal pain or bleeding, no night sweats  All other systems reviewed and are negative    Vital Signs Last 24 Hrs  T(C): 36.3 (31 Jan 2023 08:39), Max: 37 (30 Jan 2023 20:59)  T(F): 97.3 (31 Jan 2023 08:39), Max: 98.6 (30 Jan 2023 20:59)  HR: 80 (31 Jan 2023 15:08) (78 - 115)  BP: 118/63 (31 Jan 2023 15:08) (99/60 - 146/84)  BP(mean): 103 (31 Jan 2023 06:20) (94 - 103)  RR: 18 (31 Jan 2023 15:08) (16 - 18)  SpO2: 99% (31 Jan 2023 15:08) (98% - 100%)    Parameters below as of 31 Jan 2023 15:08  Patient On (Oxygen Delivery Method): nasal cannula  O2 Flow (L/min): 3    PE:    Constitutional:  No acute distress  HEENT: NC/AT, EOMI, PERRLA, conjunctivae clear; ears and nose atraumatic; pharynx benign  Neck: supple; thyroid not palpable  Back: no tenderness  Respiratory: respiratory effort normal; crackles at bases  Cardiovascular: S1S2 regular, no murmurs  Abdomen: soft, not tender, not distended, positive BS; no liver or spleen organomegaly  Genitourinary: no suprapubic tenderness  Lymphatic: no LN palpable  Musculoskeletal: no muscle tenderness, no joint swelling or tenderness  Extremities: no pedal edema  Neurological/ Psychiatric: AxOx3, judgement and insight normal; moving all extremities  Skin: no rashes; no palpable lesions    Labs: all available labs reviewed                        9.1    6.66  )-----------( 253      ( 31 Jan 2023 06:45 )             31.4     01-31    137  |  105  |  40<H>  ----------------------------<  107<H>  4.6   |  28  |  3.75<H>    Ca    8.7      31 Jan 2023 06:45  Phos  3.7     01-30  Mg     2.0     01-30    TPro  5.9<L>  /  Alb  2.0<L>  /  TBili  0.2  /  DBili  x   /  AST  21  /  ALT  26  /  AlkPhos  62  01-30     LIVER FUNCTIONS - ( 30 Jan 2023 10:43 )  Alb: 2.0 g/dL / Pro: 5.9 gm/dL / ALK PHOS: 62 U/L / ALT: 26 U/L / AST: 21 U/L / GGT: x             Culture - Blood (collected 30 Jan 2023 11:23)  Source: .Blood None  Preliminary Report (31 Jan 2023 15:01):    No growth to date.    Culture - Blood (collected 30 Jan 2023 10:43)  Source: .Blood None  Preliminary Report (31 Jan 2023 15:01):    No growth to date.    Radiology: all available radiological tests reviewed    < from: Xray Chest 1 View-PORTABLE IMMEDIATE (Xray Chest 1 View-PORTABLE IMMEDIATE .) (01.30.23 @ 12:16) >  IMPRESSION: CHF at this time. Left jugular line.  < end of copied text >    Advanced directives addressed: full resuscitation

## 2023-01-31 NOTE — CONSULT NOTE ADULT - SUBJECTIVE AND OBJECTIVE BOX
HPI:  76 y/o male with a PMHx of CAD s/p stents x4, COPD, ESRD on dialysis M/W/F presents to the ED BIBA from [facility] c/o SOB starting this morning. +runny nose. Denies cough, CP, fevers, chills. Former smoker and with O2 at the NH. He has abnormal CXR and tested + for covid, received broad spectrum abx in ed; adm for further Rx. MOLST done dnr dni (30 Jan 2023 13:09)      PAST MEDICAL & SURGICAL HISTORY:  COPD (chronic obstructive pulmonary disease)      CAD (coronary artery disease)      ESRD on dialysis          Home Medications:  Albuterol (Eqv-Proventil HFA) 90 mcg/inh inhalation aerosol: 2 puff(s) inhaled every 6 hours, As Needed - for shortness of breath and/or wheezing (30 Jan 2023 12:02)  atorvastatin 40 mg oral tablet: 1 tab(s) orally once a day (in the evening) (30 Jan 2023 12:02)  bisacodyl 10 mg rectal suppository: 1 suppository(ies) rectally once a day, As Needed if no results from MOM (30 Jan 2023 12:02)  Breo Ellipta 200 mcg-25 mcg/inh inhalation powder: 1 puff(s) inhaled once a day (30 Jan 2023 12:02)  citalopram 20 mg oral tablet: 1 tab(s) orally once a day (30 Jan 2023 12:02)  Fleet Enema 19 g-7 g rectal enema: 133 milliliter(s) rectal once if no results from Dulcolax (30 Jan 2023 12:02)  hydrALAZINE 50 mg oral tablet: 1 tab(s) orally 3 times a day (30 Jan 2023 12:02)  levothyroxine 25 mcg (0.025 mg) oral tablet: 1 tab(s) orally once a day (30 Jan 2023 12:02)  Metoprolol Succinate  mg oral tablet, extended release: 1 tab(s) orally once a day (30 Jan 2023 12:02)  Milk of Magnesia 8% oral suspension: 30 milliliter(s) orally once a day (at bedtime), As Needed - for constipation (30 Jan 2023 12:02)  mirtazapine 7.5 mg oral tablet: 1 tab(s) orally once a day (at bedtime) (30 Jan 2023 12:02)  Multiple Vitamins oral tablet: 1 tab(s) orally once a day (30 Jan 2023 12:02)  pantoprazole 40 mg oral delayed release tablet: 1 tab(s) orally once a day (30 Jan 2023 12:02)  predniSONE 10 mg oral tablet: 1 tab(s) orally once a day until 2/1 (30 Jan 2023 12:02)  Spiriva 18 mcg inhalation capsule: 1 cap(s) inhaled once a day (30 Jan 2023 12:02)  tamsulosin 0.4 mg oral capsule: 1 cap(s) orally once a day (at bedtime) (30 Jan 2023 12:02)      MEDICATIONS  (STANDING):  atorvastatin 40 milliGRAM(s) Oral at bedtime  citalopram 20 milliGRAM(s) Oral daily  heparin   Injectable 5000 Unit(s) SubCutaneous every 12 hours  hydrALAZINE 50 milliGRAM(s) Oral three times a day  levothyroxine 25 MICROGram(s) Oral daily  metoprolol succinate  milliGRAM(s) Oral daily  mirtazapine 7.5 milliGRAM(s) Oral at bedtime  pantoprazole    Tablet 40 milliGRAM(s) Oral before breakfast  predniSONE   Tablet 10 milliGRAM(s) Oral daily  tamsulosin 0.4 milliGRAM(s) Oral at bedtime  tiotropium 2.5 MICROgram(s) Inhaler 2 Puff(s) Inhalation daily    MEDICATIONS  (PRN):  acetaminophen     Tablet .. 650 milliGRAM(s) Oral every 6 hours PRN Temp greater or equal to 38C (100.4F), Mild Pain (1 - 3)  albuterol    90 MICROgram(s) HFA Inhaler 2 Puff(s) Inhalation every 6 hours PRN for shortness of breath and/or wheezing  aluminum hydroxide/magnesium hydroxide/simethicone Suspension 30 milliLiter(s) Oral every 4 hours PRN Dyspepsia  melatonin 3 milliGRAM(s) Oral at bedtime PRN Insomnia  ondansetron Injectable 4 milliGRAM(s) IV Push every 8 hours PRN Nausea and/or Vomiting      Allergies    No Known Allergies    Intolerances        SOCIAL HISTORY: Denies tobacco, etoh abuse or illicit drug use    FAMILY HISTORY:      Vital Signs Last 24 Hrs  T(C): 36.3 (31 Jan 2023 08:39), Max: 37 (30 Jan 2023 20:59)  T(F): 97.3 (31 Jan 2023 08:39), Max: 98.6 (30 Jan 2023 20:59)  HR: 78 (31 Jan 2023 08:39) (74 - 115)  BP: 130/73 (31 Jan 2023 08:39) (99/60 - 151/86)  BP(mean): 103 (31 Jan 2023 06:20) (94 - 103)  RR: 18 (31 Jan 2023 08:39) (16 - 18)  SpO2: 98% (31 Jan 2023 08:39) (98% - 100%)    Parameters below as of 31 Jan 2023 08:39  Patient On (Oxygen Delivery Method): nasal cannula  O2 Flow (L/min): 3          REVIEW OF SYSTEMS:    CONSTITUTIONAL:  As per HPI.  HEENT:  Eyes:  No diplopia or blurred vision. ENT:  No earache, sore throat or runny nose.  CARDIOVASCULAR:  No pressure, squeezing, tightness, heaviness or aching about the chest, neck, axilla or epigastrium.  RESPIRATORY:  No cough, shortness of breath, PND or orthopnea.  GASTROINTESTINAL:  No nausea, vomiting or diarrhea.  GENITOURINARY:  No dysuria, frequency or urgency.  MUSCULOSKELETAL:  As per HPI.  SKIN:  No change in skin, hair or nails.  NEUROLOGIC:  No paresthesias, fasciculations, seizures or weakness.  PSYCHIATRIC:  No disorder of thought or mood.  ENDOCRINE:  No heat or cold intolerance, polyuria or polydipsia.  HEMATOLOGICAL:  No easy bruising or bleedings:  .     PHYSICAL EXAMINATION:    GENERAL APPEARANCE:  Pt. is not currently dyspneic, in no distress. Pt. is alert, oriented, and pleasant.  HEENT:  Pupils are normal and react normally. No icterus. Mucous membranes well colored.  NECK:  Supple. No lymphadenopathy. Jugular venous pressure not elevated. Carotids equal.   HEART:   The cardiac impulse has a normal quality. Regular. Normal S1 and S2. There are no murmurs, rubs or gallops noted  CHEST:  Chest is clear to auscultation. Normal respiratory effort.  ABDOMEN:  Soft and nontender.   EXTREMITIES:  There is no cyanosis, clubbing or edema.   SKIN:  No rash or significant lesions are noted.    LABS:                        8.7    7.30  )-----------( 269      ( 30 Jan 2023 10:43 )             29.7     01-31    137  |  105  |  40<H>  ----------------------------<  107<H>  4.6   |  28  |  3.75<H>    Ca    8.7      31 Jan 2023 06:45  Phos  3.7     01-30  Mg     2.0     01-30    TPro  5.9<L>  /  Alb  2.0<L>  /  TBili  0.2  /  DBili  x   /  AST  21  /  ALT  26  /  AlkPhos  62  01-30    LIVER FUNCTIONS - ( 30 Jan 2023 10:43 )  Alb: 2.0 g/dL / Pro: 5.9 gm/dL / ALK PHOS: 62 U/L / ALT: 26 U/L / AST: 21 U/L / GGT: x           PT/INR - ( 30 Jan 2023 10:43 )   PT: 11.9 sec;   INR: 1.03 ratio         PTT - ( 30 Jan 2023 10:43 )  PTT:20.4 sec              RADIOLOGY & ADDITIONAL STUDIES:     Xray Chest 1 View-PORTABLE IMMEDIATE (Xray Chest 1 View-PORTABLE IMMEDIATE .) (01.30.23 @ 12:16) >  IMPRESSION: CHF at this time. Left jugular line.         HPI:    76 y/o male with a PMHx of CAD s/p stents x4, COPD, ESRD on dialysis M/W/F presents to the ED BIBA from [facility] c/o SOB starting this morning. +runny nose. Denies cough, CP, fevers, chills. Former smoker and with O2 at the NH. He has abnormal CXR and tested + for covid, received broad spectrum abx in ed; adm for further Rx. MOLST done dnr dni pat seen for pulmonary eval.      PAST MEDICAL & SURGICAL HISTORY:  COPD (chronic obstructive pulmonary disease)      CAD (coronary artery disease)      ESRD on dialysis          Home Medications:  Albuterol (Eqv-Proventil HFA) 90 mcg/inh inhalation aerosol: 2 puff(s) inhaled every 6 hours, As Needed - for shortness of breath and/or wheezing (30 Jan 2023 12:02)  atorvastatin 40 mg oral tablet: 1 tab(s) orally once a day (in the evening) (30 Jan 2023 12:02)  bisacodyl 10 mg rectal suppository: 1 suppository(ies) rectally once a day, As Needed if no results from MOM (30 Jan 2023 12:02)  Breo Ellipta 200 mcg-25 mcg/inh inhalation powder: 1 puff(s) inhaled once a day (30 Jan 2023 12:02)  citalopram 20 mg oral tablet: 1 tab(s) orally once a day (30 Jan 2023 12:02)  Fleet Enema 19 g-7 g rectal enema: 133 milliliter(s) rectal once if no results from Dulcolax (30 Jan 2023 12:02)  hydrALAZINE 50 mg oral tablet: 1 tab(s) orally 3 times a day (30 Jan 2023 12:02)  levothyroxine 25 mcg (0.025 mg) oral tablet: 1 tab(s) orally once a day (30 Jan 2023 12:02)  Metoprolol Succinate  mg oral tablet, extended release: 1 tab(s) orally once a day (30 Jan 2023 12:02)  Milk of Magnesia 8% oral suspension: 30 milliliter(s) orally once a day (at bedtime), As Needed - for constipation (30 Jan 2023 12:02)  mirtazapine 7.5 mg oral tablet: 1 tab(s) orally once a day (at bedtime) (30 Jan 2023 12:02)  Multiple Vitamins oral tablet: 1 tab(s) orally once a day (30 Jan 2023 12:02)  pantoprazole 40 mg oral delayed release tablet: 1 tab(s) orally once a day (30 Jan 2023 12:02)  predniSONE 10 mg oral tablet: 1 tab(s) orally once a day until 2/1 (30 Jan 2023 12:02)  Spiriva 18 mcg inhalation capsule: 1 cap(s) inhaled once a day (30 Jan 2023 12:02)  tamsulosin 0.4 mg oral capsule: 1 cap(s) orally once a day (at bedtime) (30 Jan 2023 12:02)      MEDICATIONS  (STANDING):  atorvastatin 40 milliGRAM(s) Oral at bedtime  citalopram 20 milliGRAM(s) Oral daily  heparin   Injectable 5000 Unit(s) SubCutaneous every 12 hours  hydrALAZINE 50 milliGRAM(s) Oral three times a day  levothyroxine 25 MICROGram(s) Oral daily  metoprolol succinate  milliGRAM(s) Oral daily  mirtazapine 7.5 milliGRAM(s) Oral at bedtime  pantoprazole    Tablet 40 milliGRAM(s) Oral before breakfast  predniSONE   Tablet 10 milliGRAM(s) Oral daily  tamsulosin 0.4 milliGRAM(s) Oral at bedtime  tiotropium 2.5 MICROgram(s) Inhaler 2 Puff(s) Inhalation daily    MEDICATIONS  (PRN):  acetaminophen     Tablet .. 650 milliGRAM(s) Oral every 6 hours PRN Temp greater or equal to 38C (100.4F), Mild Pain (1 - 3)  albuterol    90 MICROgram(s) HFA Inhaler 2 Puff(s) Inhalation every 6 hours PRN for shortness of breath and/or wheezing  aluminum hydroxide/magnesium hydroxide/simethicone Suspension 30 milliLiter(s) Oral every 4 hours PRN Dyspepsia  melatonin 3 milliGRAM(s) Oral at bedtime PRN Insomnia  ondansetron Injectable 4 milliGRAM(s) IV Push every 8 hours PRN Nausea and/or Vomiting      Allergies    No Known Allergies    Intolerances        SOCIAL HISTORY: Denies tobacco, etoh abuse or illicit drug use    FAMILY HISTORY:      Vital Signs Last 24 Hrs  T(C): 36.3 (31 Jan 2023 08:39), Max: 37 (30 Jan 2023 20:59)  T(F): 97.3 (31 Jan 2023 08:39), Max: 98.6 (30 Jan 2023 20:59)  HR: 78 (31 Jan 2023 08:39) (74 - 115)  BP: 130/73 (31 Jan 2023 08:39) (99/60 - 151/86)  BP(mean): 103 (31 Jan 2023 06:20) (94 - 103)  RR: 18 (31 Jan 2023 08:39) (16 - 18)  SpO2: 98% (31 Jan 2023 08:39) (98% - 100%)    Parameters below as of 31 Jan 2023 08:39  Patient On (Oxygen Delivery Method): nasal cannula  O2 Flow (L/min): 3          REVIEW OF SYSTEMS:    CONSTITUTIONAL:  As per HPI.  HEENT:  Eyes:  No diplopia or blurred vision. ENT:  No earache, sore throat or runny nose.  CARDIOVASCULAR:  No pressure, squeezing, tightness, heaviness or aching about the chest, neck, axilla or epigastrium.  RESPIRATORY:  No cough, shortness of breath, PND or orthopnea.  GASTROINTESTINAL:  No nausea, vomiting or diarrhea.  GENITOURINARY:  No dysuria, frequency or urgency.  MUSCULOSKELETAL:  As per HPI.  SKIN:  No change in skin, hair or nails.  NEUROLOGIC:  No paresthesias, fasciculations, seizures or weakness.  PSYCHIATRIC:  No disorder of thought or mood.  ENDOCRINE:  No heat or cold intolerance, polyuria or polydipsia.  HEMATOLOGICAL:  No easy bruising or bleedings:  .     PHYSICAL EXAMINATION:    GENERAL APPEARANCE:  Pt. is not currently dyspneic, in no distress. Pt. is alert, oriented, and pleasant.  HEENT:  Pupils are normal and react normally. No icterus. Mucous membranes well colored.  NECK:  Supple. No lymphadenopathy. Jugular venous pressure not elevated. Carotids equal.   HEART:   The cardiac impulse has a normal quality. Regular. Normal S1 and S2. There are no murmurs, rubs or gallops noted  CHEST:  Chest is clear to auscultation. Normal respiratory effort.  ABDOMEN:  Soft and nontender.   EXTREMITIES:  There is no cyanosis, clubbing or edema.   SKIN:  No rash or significant lesions are noted.    LABS:                        8.7    7.30  )-----------( 269      ( 30 Jan 2023 10:43 )             29.7     01-31    137  |  105  |  40<H>  ----------------------------<  107<H>  4.6   |  28  |  3.75<H>    Ca    8.7      31 Jan 2023 06:45  Phos  3.7     01-30  Mg     2.0     01-30    TPro  5.9<L>  /  Alb  2.0<L>  /  TBili  0.2  /  DBili  x   /  AST  21  /  ALT  26  /  AlkPhos  62  01-30    LIVER FUNCTIONS - ( 30 Jan 2023 10:43 )  Alb: 2.0 g/dL / Pro: 5.9 gm/dL / ALK PHOS: 62 U/L / ALT: 26 U/L / AST: 21 U/L / GGT: x           PT/INR - ( 30 Jan 2023 10:43 )   PT: 11.9 sec;   INR: 1.03 ratio         PTT - ( 30 Jan 2023 10:43 )  PTT:20.4 sec              RADIOLOGY & ADDITIONAL STUDIES:     Xray Chest 1 View-PORTABLE IMMEDIATE (Xray Chest 1 View-PORTABLE IMMEDIATE .) (01.30.23 @ 12:16) >  IMPRESSION: CHF at this time. Left jugular line.

## 2023-01-31 NOTE — CONSULT NOTE ADULT - ASSESSMENT
* SOB multifactorial ESRD, COVID, pneumonia* COPD* HTNs/p IV broad spectrum  for HD today consult nephrology  ID to advise on covid Rx if any    * COPD  on home O2  Albuterol  Pulm consult  he was tapered off roids last dose was supposed to be 2/1; consult pulm    * HTN  on high dose BB at home, hydralazine   PROBLEMS;    SOB multifactorial ESRD+COVID, pneumonia  COPD  HTN    PLAN:    PO PREDNISONE  Home O2  Albuterol  HD  SUPPORTIVE CARE  DVT PROPHYLASIX

## 2023-01-31 NOTE — DIETITIAN INITIAL EVALUATION ADULT - ADD RECOMMEND
1) C/w Renal Restricted diet  2) Add ensure plus high protein BID to optimize PO intake (provides 350 kcal, 20g protein/ shake)  3) Consider adding thiamine 100 mg daily 2/2 poor PO intake/ malnutrition  4) Recommend to add Nephro-edis, Vit C 500 mg BID, add Zinc Sulfate 220 mg x 10 days to promote wound healing.  5) Please obtain weekly wts  6) Encourage protein-rich foods, maximize food preferences  7) Monitor bowel movements, if no BM for >3 days, consider implementing bowel regimen.  8) Confirm goals of care regarding nutrition support  RD will continue to monitor PO intake, labs, hydration, and wt prn.

## 2023-01-31 NOTE — DIETITIAN INITIAL EVALUATION ADULT - PERTINENT LABORATORY DATA
01-31    137  |  105  |  40<H>  ----------------------------<  107<H>  4.6   |  28  |  3.75<H>    Ca    8.7      31 Jan 2023 06:45  Phos  3.7     01-30  Mg     2.0     01-30    TPro  5.9<L>  /  Alb  2.0<L>  /  TBili  0.2  /  DBili  x   /  AST  21  /  ALT  26  /  AlkPhos  62  01-30

## 2023-01-31 NOTE — DIETITIAN INITIAL EVALUATION ADULT - ORAL INTAKE PTA/DIET HISTORY
Reports poor intake x 1 month 2/2 not liking the food at [facility] or [facility]. States that he was receiving 3 meals/day and Ensure to help with intake. Attempts to follow a renal diet and watches his potassium intake.

## 2023-01-31 NOTE — PROGRESS NOTE ADULT - SUBJECTIVE AND OBJECTIVE BOX
1/31: feels weak      PHYSICAL EXAM:    Daily     Daily     Vital Signs Last 24 Hrs  T(C): 36.2 (31 Jan 2023 15:08), Max: 37 (30 Jan 2023 20:59)  T(F): 97.2 (31 Jan 2023 15:08), Max: 98.6 (30 Jan 2023 20:59)  HR: 80 (31 Jan 2023 15:08) (78 - 115)  BP: 118/63 (31 Jan 2023 15:08) (99/60 - 146/84)  BP(mean): 103 (31 Jan 2023 06:20) (94 - 103)  RR: 18 (31 Jan 2023 15:08) (16 - 18)  SpO2: 99% (31 Jan 2023 15:08) (98% - 100%)    Constitutional: Weak and ill appearing  HEENT: Atraumatic, DELLA,   Respiratory: Breath Sounds normal, no rhonchi/wheeze  Cardiovascular: N S1S2;   Gastrointestinal: Abdomen soft, non tender, Bowel Sounds present  Extremities: No edema, peripheral pulses present  Neurological: AAO x 3, no gross focal motor deficits  Skin: Non cellulitic, no rash, ulcers  Lymph Nodes: No lymphadenopathy noted  Back: No CVA tenderness   Musculoskeletal: non tender  Breasts: Deferred  Genitourinary: deferred  Rectal: Deferred    All Labs/EKG/Radiology/Meds reviewed by me                          9.1    6.66  )-----------( 253      ( 31 Jan 2023 06:45 )             31.4       CBC Full  -  ( 31 Jan 2023 06:45 )  WBC Count : 6.66 K/uL  RBC Count : 3.06 M/uL  Hemoglobin : 9.1 g/dL  Hematocrit : 31.4 %  Platelet Count - Automated : 253 K/uL  Mean Cell Volume : 102.6 fl  Mean Cell Hemoglobin : 29.7 pg  Mean Cell Hemoglobin Concentration : 29.0 gm/dL  Auto Neutrophil # : x  Auto Lymphocyte # : x  Auto Monocyte # : x  Auto Eosinophil # : x  Auto Basophil # : x  Auto Neutrophil % : x  Auto Lymphocyte % : x  Auto Monocyte % : x  Auto Eosinophil % : x  Auto Basophil % : x      01-31    137  |  105  |  40<H>  ----------------------------<  107<H>  4.6   |  28  |  3.75<H>    Ca    8.7      31 Jan 2023 06:45  Phos  3.7     01-30  Mg     2.0     01-30    TPro  5.9<L>  /  Alb  2.0<L>  /  TBili  0.2  /  DBili  x   /  AST  21  /  ALT  26  /  AlkPhos  62  01-30      LIVER FUNCTIONS - ( 30 Jan 2023 10:43 )  Alb: 2.0 g/dL / Pro: 5.9 gm/dL / ALK PHOS: 62 U/L / ALT: 26 U/L / AST: 21 U/L / GGT: x             PT/INR - ( 30 Jan 2023 10:43 )   PT: 11.9 sec;   INR: 1.03 ratio         PTT - ( 30 Jan 2023 10:43 )  PTT:20.4 sec                  MEDICATIONS  (STANDING):  atorvastatin 40 milliGRAM(s) Oral at bedtime  budesonide 160 MICROgram(s)/formoterol 4.5 MICROgram(s) Inhaler 2 Puff(s) Inhalation two times a day  citalopram 20 milliGRAM(s) Oral daily  heparin   Injectable 5000 Unit(s) SubCutaneous every 12 hours  hydrALAZINE 50 milliGRAM(s) Oral three times a day  levothyroxine 25 MICROGram(s) Oral daily  metoprolol succinate  milliGRAM(s) Oral daily  mirtazapine 7.5 milliGRAM(s) Oral at bedtime  pantoprazole    Tablet 40 milliGRAM(s) Oral before breakfast  predniSONE   Tablet 10 milliGRAM(s) Oral daily  tamsulosin 0.4 milliGRAM(s) Oral at bedtime  tiotropium 2.5 MICROgram(s) Inhaler 2 Puff(s) Inhalation daily    MEDICATIONS  (PRN):  acetaminophen     Tablet .. 650 milliGRAM(s) Oral every 6 hours PRN Temp greater or equal to 38C (100.4F), Mild Pain (1 - 3)  albuterol    90 MICROgram(s) HFA Inhaler 2 Puff(s) Inhalation every 6 hours PRN for shortness of breath and/or wheezing  melatonin 3 milliGRAM(s) Oral at bedtime PRN Insomnia  ondansetron Injectable 4 milliGRAM(s) IV Push every 8 hours PRN Nausea and/or Vomiting

## 2023-01-31 NOTE — PROGRESS NOTE ADULT - ASSESSMENT
76 y/o male with a PMHx of CAD s/p stents x4, COPD, ESRD on dialysis M/W/F presents to the ED BIBA from [facility] c/o SOB starting this morning. +runny nose. Denies cough, CP, fevers, chills. Former smoker and with O2 at the NH. He has abnormal CXR and tested + for covid, received broad spectrum abx in ed; adm for further Rx. MOLST done dnr dni    Pt admitted with :    * SOB multifactorial ESRD, COVID, pneumonia  for HD today consult nephrology  no remdesivir per ID d/t renal function  monitor off ABX    * COPD  on home O2  Albuterol  Pulm consult  he was tapered off steroids last dose was supposed to be 2/1; consult pulm    * HTN  on high dose BB at home, hydralazine    dnr dni

## 2023-02-01 LAB
ALBUMIN SERPL ELPH-MCNC: 1.9 G/DL — LOW (ref 3.3–5)
ANION GAP SERPL CALC-SCNC: 6 MMOL/L — SIGNIFICANT CHANGE UP (ref 5–17)
APTT BLD: 28.1 SEC — SIGNIFICANT CHANGE UP (ref 27.5–35.5)
BUN SERPL-MCNC: 61 MG/DL — HIGH (ref 7–23)
CALCIUM SERPL-MCNC: 8.7 MG/DL — SIGNIFICANT CHANGE UP (ref 8.5–10.1)
CHLORIDE SERPL-SCNC: 107 MMOL/L — SIGNIFICANT CHANGE UP (ref 96–108)
CO2 SERPL-SCNC: 25 MMOL/L — SIGNIFICANT CHANGE UP (ref 22–31)
CREAT SERPL-MCNC: 5.24 MG/DL — HIGH (ref 0.5–1.3)
CULTURE RESULTS: SIGNIFICANT CHANGE UP
EGFR: 10 ML/MIN/1.73M2 — LOW
GLUCOSE SERPL-MCNC: 176 MG/DL — HIGH (ref 70–99)
HCT VFR BLD CALC: 31.4 % — LOW (ref 39–50)
HGB BLD-MCNC: 9 G/DL — LOW (ref 13–17)
INR BLD: 0.96 RATIO — SIGNIFICANT CHANGE UP (ref 0.88–1.16)
MCHC RBC-ENTMCNC: 28.7 GM/DL — LOW (ref 32–36)
MCHC RBC-ENTMCNC: 29.8 PG — SIGNIFICANT CHANGE UP (ref 27–34)
MCV RBC AUTO: 104 FL — HIGH (ref 80–100)
ORGANISM # SPEC MICROSCOPIC CNT: SIGNIFICANT CHANGE UP
ORGANISM # SPEC MICROSCOPIC CNT: SIGNIFICANT CHANGE UP
PHOSPHATE SERPL-MCNC: 3.1 MG/DL — SIGNIFICANT CHANGE UP (ref 2.5–4.5)
PLATELET # BLD AUTO: 306 K/UL — SIGNIFICANT CHANGE UP (ref 150–400)
POTASSIUM SERPL-MCNC: 4.2 MMOL/L — SIGNIFICANT CHANGE UP (ref 3.5–5.3)
POTASSIUM SERPL-SCNC: 4.2 MMOL/L — SIGNIFICANT CHANGE UP (ref 3.5–5.3)
PROTHROM AB SERPL-ACNC: 11.1 SEC — SIGNIFICANT CHANGE UP (ref 10.5–13.4)
RBC # BLD: 3.02 M/UL — LOW (ref 4.2–5.8)
RBC # FLD: 18.4 % — HIGH (ref 10.3–14.5)
SODIUM SERPL-SCNC: 138 MMOL/L — SIGNIFICANT CHANGE UP (ref 135–145)
SPECIMEN SOURCE: SIGNIFICANT CHANGE UP
WBC # BLD: 7.8 K/UL — SIGNIFICANT CHANGE UP (ref 3.8–10.5)
WBC # FLD AUTO: 7.8 K/UL — SIGNIFICANT CHANGE UP (ref 3.8–10.5)

## 2023-02-01 PROCEDURE — 71275 CT ANGIOGRAPHY CHEST: CPT | Mod: 26

## 2023-02-01 PROCEDURE — 99232 SBSQ HOSP IP/OBS MODERATE 35: CPT

## 2023-02-01 RX ORDER — DEXAMETHASONE 0.5 MG/5ML
6 ELIXIR ORAL DAILY
Refills: 0 | Status: DISCONTINUED | OUTPATIENT
Start: 2023-02-02 | End: 2023-02-03

## 2023-02-01 RX ORDER — HEPARIN SODIUM 5000 [USP'U]/ML
2500 INJECTION INTRAVENOUS; SUBCUTANEOUS EVERY 6 HOURS
Refills: 0 | Status: DISCONTINUED | OUTPATIENT
Start: 2023-02-01 | End: 2023-02-01

## 2023-02-01 RX ORDER — HEPARIN SODIUM 5000 [USP'U]/ML
5000 INJECTION INTRAVENOUS; SUBCUTANEOUS EVERY 8 HOURS
Refills: 0 | Status: DISCONTINUED | OUTPATIENT
Start: 2023-02-01 | End: 2023-02-02

## 2023-02-01 RX ORDER — HEPARIN SODIUM 5000 [USP'U]/ML
INJECTION INTRAVENOUS; SUBCUTANEOUS
Qty: 25000 | Refills: 0 | Status: DISCONTINUED | OUTPATIENT
Start: 2023-02-01 | End: 2023-02-01

## 2023-02-01 RX ORDER — HEPARIN SODIUM 5000 [USP'U]/ML
5000 INJECTION INTRAVENOUS; SUBCUTANEOUS EVERY 6 HOURS
Refills: 0 | Status: DISCONTINUED | OUTPATIENT
Start: 2023-02-01 | End: 2023-02-01

## 2023-02-01 RX ADMIN — Medication 25 MICROGRAM(S): at 05:03

## 2023-02-01 RX ADMIN — Medication 50 MILLIGRAM(S): at 05:03

## 2023-02-01 RX ADMIN — TAMSULOSIN HYDROCHLORIDE 0.4 MILLIGRAM(S): 0.4 CAPSULE ORAL at 22:20

## 2023-02-01 RX ADMIN — HEPARIN SODIUM 5000 UNIT(S): 5000 INJECTION INTRAVENOUS; SUBCUTANEOUS at 22:21

## 2023-02-01 RX ADMIN — ALBUTEROL 2 PUFF(S): 90 AEROSOL, METERED ORAL at 17:21

## 2023-02-01 RX ADMIN — ATORVASTATIN CALCIUM 40 MILLIGRAM(S): 80 TABLET, FILM COATED ORAL at 22:20

## 2023-02-01 RX ADMIN — BUDESONIDE AND FORMOTEROL FUMARATE DIHYDRATE 2 PUFF(S): 160; 4.5 AEROSOL RESPIRATORY (INHALATION) at 21:07

## 2023-02-01 RX ADMIN — PANTOPRAZOLE SODIUM 40 MILLIGRAM(S): 20 TABLET, DELAYED RELEASE ORAL at 05:03

## 2023-02-01 RX ADMIN — Medication 3 MILLIGRAM(S): at 22:20

## 2023-02-01 RX ADMIN — MIRTAZAPINE 7.5 MILLIGRAM(S): 45 TABLET, ORALLY DISINTEGRATING ORAL at 22:20

## 2023-02-01 NOTE — PROGRESS NOTE ADULT - SUBJECTIVE AND OBJECTIVE BOX
Patient seen and examined  reports feeling weeak  on 3 liters  on COVID treatment (not on remdesvir)  on heparin drip for elevated ddimer    vitals stable afebrile    Review of Systems:  General:denies fever chills, headache, weakness  HEENT: denies blurry vision,diffculty swallowing, difficulty hearing, tinnitus  Cardiovascular: denies chest pain  ,palpitations  Pulmonary:denies shortness of breath, cough, wheezing, hemoptysis  Gastrointestinal: denies abdominal pain, constipation, diarrhea,nausea , vomiting, hematochezia  : denies hematuria, dysuria, or incontinence  Neurological: denies weakness, numbness , tingling, dizziness, tremors  MSK: denies muscle pain, difficulty ambulating, swelling, back pain  skin: denies skin rash, itching, burning, or  skin lesions  Psychiatrical: denies mood disturbances, anxierty, feeling depressed, depression , or difficulty sleeping    Objective:  Vitals  T(C): 36.4 (02-01-23 @ 12:56), Max: 37 (01-31-23 @ 21:39)  HR: 80 (02-01-23 @ 12:56) (72 - 87)  BP: 115/77 (02-01-23 @ 12:56) (92/58 - 133/69)  RR: 18 (02-01-23 @ 12:56) (18 - 18)  SpO2: 98% (02-01-23 @ 12:56) (98% - 100%)    Physical Exam:  General: comfortable, no acute distress, well nourished  HEENT: Atraumatic, no LAD, trachea midline, PERRLA  Cardiovascular: normal s1s2, no murmurs, gallops or fricition rubs  Pulmonary: clear to ausculation Bilaterally, no wheezing , rhonchi  Gastrointestinal: soft non tender non distended, no masses felt, no organomegally  Muscloskeletal: no lower extremity edema, intact bilateral lower extremity pulses  Neurological: CN II-12 intact. No focal weakness  Psychiatrical: normal mood, cooperative  SKIN: no rash, lesions or ulcers    Labs:                          9.0    7.80  )-----------( 306      ( 01 Feb 2023 08:38 )             31.4     02-01    138  |  107  |  61<H>  ----------------------------<  176<H>  4.2   |  25  |  5.24<H>    Ca    8.7      01 Feb 2023 08:38  Phos  3.1     02-01    TPro  x   /  Alb  1.9<L>  /  TBili  x   /  DBili  x   /  AST  x   /  ALT  x   /  AlkPhos  x   02-01    LIVER FUNCTIONS - ( 01 Feb 2023 08:38 )  Alb: 1.9 g/dL / Pro: x     / ALK PHOS: x     / ALT: x     / AST: x     / GGT: x                 Active Medications  MEDICATIONS  (STANDING):  atorvastatin 40 milliGRAM(s) Oral at bedtime  budesonide 160 MICROgram(s)/formoterol 4.5 MICROgram(s) Inhaler 2 Puff(s) Inhalation two times a day  citalopram 20 milliGRAM(s) Oral daily  heparin  Infusion.  Unit(s)/Hr (11 mL/Hr) IV Continuous <Continuous>  hydrALAZINE 50 milliGRAM(s) Oral three times a day  levothyroxine 25 MICROGram(s) Oral daily  metoprolol succinate  milliGRAM(s) Oral daily  mirtazapine 7.5 milliGRAM(s) Oral at bedtime  pantoprazole    Tablet 40 milliGRAM(s) Oral before breakfast  tamsulosin 0.4 milliGRAM(s) Oral at bedtime  tiotropium 2.5 MICROgram(s) Inhaler 2 Puff(s) Inhalation daily    MEDICATIONS  (PRN):  acetaminophen     Tablet .. 650 milliGRAM(s) Oral every 6 hours PRN Temp greater or equal to 38C (100.4F), Mild Pain (1 - 3)  albuterol    90 MICROgram(s) HFA Inhaler 2 Puff(s) Inhalation every 6 hours PRN for shortness of breath and/or wheezing  heparin   Injectable 5000 Unit(s) IV Push every 6 hours PRN For aPTT less than 40  heparin   Injectable 2500 Unit(s) IV Push every 6 hours PRN For aPTT between 40 - 57  melatonin 3 milliGRAM(s) Oral at bedtime PRN Insomnia  ondansetron Injectable 4 milliGRAM(s) IV Push every 8 hours PRN Nausea and/or Vomiting

## 2023-02-01 NOTE — PHYSICAL THERAPY INITIAL EVALUATION ADULT - PLANNED THERAPY INTERVENTIONS, PT EVAL
Eval, amb, transfers, bed mobilituy x 15'. Pt left in bed with all observation section equipment/material intact and bed alarm activated. Pt with no c/o pain. Will cont to follow pt and increase as tolerated./bed mobility training/gait training/transfer training

## 2023-02-01 NOTE — PROGRESS NOTE ADULT - SUBJECTIVE AND OBJECTIVE BOX
NEPHROLOGY INTERVAL HPI/OVERNIGHT EVENTS:  02-01-23 @ 11:32    2/1 seen at hd, appears comf  HPI:  76 y/o male with a PMHx of CAD s/p stents x4, COPD, ESRD on dialysis M/W/F presents to the ED [facility] from [facility] c/o SOB starting this morning. +runny nose. Denies cough, CP, fevers, chills. Former smoker and with O2 at the NH. He has abnormal CXR and tested + for covid, received broad spectrum abx in ed; adm for further Rx. MOLST done dnr dni (30 Jan 2023 13:09)      Patient is a 77y old  Male who presents with a chief complaint of sob (30 Jan 2023 13:09)  ----  pt poor historian   has hx ofesrd for past 8 years ( doesnot know his nephro) [place] [facility]   [place] to hospt for Suicide attempt with attempt of cuttting his AVF site  now hd via TDC   send from [facility] with sob  with hx of severe copd on o2       PAST MEDICAL & SURGICAL HISTORY:  [place] [facility] [place]   -suicide attempt with cutting of avf site  - COPD  - CAD      MEDICATIONS  (STANDING):  atorvastatin 40 milliGRAM(s) Oral at bedtime  budesonide 160 MICROgram(s)/formoterol 4.5 MICROgram(s) Inhaler 2 Puff(s) Inhalation two times a day  citalopram 20 milliGRAM(s) Oral daily  heparin  Infusion.  Unit(s)/Hr (11 mL/Hr) IV Continuous <Continuous>  hydrALAZINE 50 milliGRAM(s) Oral three times a day  levothyroxine 25 MICROGram(s) Oral daily  metoprolol succinate  milliGRAM(s) Oral daily  mirtazapine 7.5 milliGRAM(s) Oral at bedtime  pantoprazole    Tablet 40 milliGRAM(s) Oral before breakfast  tamsulosin 0.4 milliGRAM(s) Oral at bedtime  tiotropium 2.5 MICROgram(s) Inhaler 2 Puff(s) Inhalation daily    MEDICATIONS  (PRN):  acetaminophen     Tablet .. 650 milliGRAM(s) Oral every 6 hours PRN Temp greater or equal to 38C (100.4F), Mild Pain (1 - 3)  albuterol    90 MICROgram(s) HFA Inhaler 2 Puff(s) Inhalation every 6 hours PRN for shortness of breath and/or wheezing  heparin   Injectable 5000 Unit(s) IV Push every 6 hours PRN For aPTT less than 40  heparin   Injectable 2500 Unit(s) IV Push every 6 hours PRN For aPTT between 40 - 57  melatonin 3 milliGRAM(s) Oral at bedtime PRN Insomnia  ondansetron Injectable 4 milliGRAM(s) IV Push every 8 hours PRN Nausea and/or Vomiting      Allergies    No Known Allergies    Intolerances        I&O's Detail      .    Patient was seen and evaluated on dialysis.   Patient is tolerating the procedure well.   Continue dialysis:   Dialyzer:   revaclear 300 k protocol uf goal of 3.6 kg   tdc bfr 350-400    Vital Signs Last 24 Hrs  T(C): 36.6 (01 Feb 2023 08:23), Max: 37 (31 Jan 2023 21:39)  T(F): 97.8 (01 Feb 2023 08:23), Max: 98.6 (31 Jan 2023 21:39)  HR: 87 (01 Feb 2023 11:30) (72 - 87)  BP: 97/55 (01 Feb 2023 11:30) (92/58 - 133/69)  BP(mean): --  RR: 18 (01 Feb 2023 11:30) (18 - 18)  SpO2: 100% (01 Feb 2023 08:18) (98% - 100%)    Parameters below as of 01 Feb 2023 11:30  Patient On (Oxygen Delivery Method): nasal cannula  O2 Flow (L/min): 3    Daily     Daily     PHYSICAL EXAM:  General: alert. awake NAD  HEENT: MMM  CV: s1s2 rrr  LUNGS: B/L CTA  EXT: no edema    LABS:                        9.0    7.80  )-----------( 306      ( 01 Feb 2023 08:38 )             31.4     02-01    138  |  107  |  61<H>  ----------------------------<  176<H>  4.2   |  25  |  5.24<H>    Ca    8.7      01 Feb 2023 08:38  Phos  3.1     02-01    TPro  x   /  Alb  1.9<L>  /  TBili  x   /  DBili  x   /  AST  x   /  ALT  x   /  AlkPhos  x   02-01        Phosphorus Level, Serum: 3.1 mg/dL (02-01 @ 08:38)

## 2023-02-01 NOTE — PHYSICAL THERAPY INITIAL EVALUATION ADULT - TRANSFER SKILLS, REHAB EVAL
"   08/04/19 1500   Behavioral Health   Hallucinations denies / not responding to hallucinations   Thinking distractable   Orientation person: oriented;place: oriented;date: oriented   Memory baseline memory   Insight poor   Judgement impaired   Eye Contact at examiner   Affect full range affect   Mood anxious   Physical Appearance/Attire attire appropriate to age and situation   Hygiene well groomed   Suicidality other (see comments)  (none stated)   1. Wish to be Dead No   2. Non-Specific Active Suicidal Thoughts  No   3. Active Sucidal Ideation with any Methods (Not Plan) Without Intent to Act  No   Self Injury other (see comment)  (none stated )   Speech clear   Medication Sensitivity no observed side effects   Psychomotor / Gait balanced;steady   Activities of Daily Living   Hygiene/Grooming independent   Oral Hygiene independent   Dress street clothes   Laundry unable to complete   Room Organization independent     Pt. Appeared to be pleasant and calm. Pt. Was social with others in the milieu. Pt. Had a visit from her  and father-in law. Pt. Told staff that she is feeling anxious due to her roommate having a cough. Pt. Claim to have a \"compromised immune system\".  Staff told the nurse. Pt. Denies SI or SIB.   " independent

## 2023-02-01 NOTE — PROGRESS NOTE ADULT - ASSESSMENT
PROBLEMS;    SOB multifactorial ESRD+COVID, pneumonia  COPD  HTN    PLAN:    PO PREDNISONE  Home O2  Albuterol  HD  SUPPORTIVE CARE  DVT PROPHYLASIX PROBLEMS;    SOB multifactorial ESRD+COVID, pneumonia  COPD  HTN    PLAN:    pulmonary better  PO PREDNISONE-decd today  Home O2  Albuterol  HD  SUPPORTIVE CARE  DVT PROPHYLASIX

## 2023-02-01 NOTE — PROGRESS NOTE ADULT - ASSESSMENT
76 y/o male with a PMHx of CAD s/p stents x4, COPD, ESRD on dialysis M/W/F presents to the ED BIBA from [facility] c/o SOB starting this morning. +runny nose. Denies cough, CP, fevers, chills. Former smoker and with O2 at the NH. He has abnormal CXR and tested + for covid, received broad spectrum abx in ed; adm for further Rx. MOLST done dnr dni    Pt admitted with :    * SOB multifactorial ESRD, COVID, pneumonia  for HD today consult nephrology  no remdesivir per ID d/t renal function  monitor off ABX    * COPD  on home O2  Albuterol  Pulm consult  he was tapered off steroids last dose was supposed to be 2/1; consult pulm    * HTN  on high dose BB at home, hydralazine    dnr dni

## 2023-02-01 NOTE — PROGRESS NOTE ADULT - ASSESSMENT
78 y/o male with a PMHx of CAD s/p stents x4, COPD, ESRD on dialysis M/W/F presents to the ED BIBA from [facility] c/o SOB starting this morning. +runny nose. Denies cough, CP, fevers, chills. Former smoker and with O2 at the   esrd mwf at [place] unit   cxr with CHF admitted with COPD exaceb with hx of severe COPD on o2 at home   hx of suicide attempt with cutting of avf recently   covid pos    REC  - hd today for 3.5 hrs via tdc with inc uf with hd   - anemia nehemiah with hd     2/1 MK   - esrd tolerating hd   - COPD exxac with covid pna     supportive care, steroids   - anemia nehemiah with hd to start 2/3  no renal barrier for dc

## 2023-02-01 NOTE — PROGRESS NOTE ADULT - SUBJECTIVE AND OBJECTIVE BOX
Subjective:    Home Medications:  Albuterol (Eqv-Proventil HFA) 90 mcg/inh inhalation aerosol: 2 puff(s) inhaled every 6 hours, As Needed - for shortness of breath and/or wheezing (30 Jan 2023 12:02)  atorvastatin 40 mg oral tablet: 1 tab(s) orally once a day (in the evening) (30 Jan 2023 12:02)  bisacodyl 10 mg rectal suppository: 1 suppository(ies) rectally once a day, As Needed if no results from MOM (30 Jan 2023 12:02)  Breo Ellipta 200 mcg-25 mcg/inh inhalation powder: 1 puff(s) inhaled once a day (30 Jan 2023 12:02)  citalopram 20 mg oral tablet: 1 tab(s) orally once a day (30 Jan 2023 12:02)  Fleet Enema 19 g-7 g rectal enema: 133 milliliter(s) rectal once if no results from Dulcolax (30 Jan 2023 12:02)  hydrALAZINE 50 mg oral tablet: 1 tab(s) orally 3 times a day (30 Jan 2023 12:02)  levothyroxine 25 mcg (0.025 mg) oral tablet: 1 tab(s) orally once a day (30 Jan 2023 12:02)  Metoprolol Succinate  mg oral tablet, extended release: 1 tab(s) orally once a day (30 Jan 2023 12:02)  Milk of Magnesia 8% oral suspension: 30 milliliter(s) orally once a day (at bedtime), As Needed - for constipation (30 Jan 2023 12:02)  mirtazapine 7.5 mg oral tablet: 1 tab(s) orally once a day (at bedtime) (30 Jan 2023 12:02)  Multiple Vitamins oral tablet: 1 tab(s) orally once a day (30 Jan 2023 12:02)  pantoprazole 40 mg oral delayed release tablet: 1 tab(s) orally once a day (30 Jan 2023 12:02)  predniSONE 10 mg oral tablet: 1 tab(s) orally once a day until 2/1 (30 Jan 2023 12:02)  Spiriva 18 mcg inhalation capsule: 1 cap(s) inhaled once a day (30 Jan 2023 12:02)  tamsulosin 0.4 mg oral capsule: 1 cap(s) orally once a day (at bedtime) (30 Jan 2023 12:02)    MEDICATIONS  (STANDING):  atorvastatin 40 milliGRAM(s) Oral at bedtime  budesonide 160 MICROgram(s)/formoterol 4.5 MICROgram(s) Inhaler 2 Puff(s) Inhalation two times a day  citalopram 20 milliGRAM(s) Oral daily  heparin   Injectable 5000 Unit(s) SubCutaneous every 12 hours  hydrALAZINE 50 milliGRAM(s) Oral three times a day  levothyroxine 25 MICROGram(s) Oral daily  metoprolol succinate  milliGRAM(s) Oral daily  mirtazapine 7.5 milliGRAM(s) Oral at bedtime  pantoprazole    Tablet 40 milliGRAM(s) Oral before breakfast  predniSONE   Tablet 10 milliGRAM(s) Oral daily  tamsulosin 0.4 milliGRAM(s) Oral at bedtime  tiotropium 2.5 MICROgram(s) Inhaler 2 Puff(s) Inhalation daily    MEDICATIONS  (PRN):  acetaminophen     Tablet .. 650 milliGRAM(s) Oral every 6 hours PRN Temp greater or equal to 38C (100.4F), Mild Pain (1 - 3)  albuterol    90 MICROgram(s) HFA Inhaler 2 Puff(s) Inhalation every 6 hours PRN for shortness of breath and/or wheezing  melatonin 3 milliGRAM(s) Oral at bedtime PRN Insomnia  ondansetron Injectable 4 milliGRAM(s) IV Push every 8 hours PRN Nausea and/or Vomiting      Allergies    No Known Allergies    Intolerances        Vital Signs Last 24 Hrs  T(C): 36.6 (01 Feb 2023 08:23), Max: 37 (31 Jan 2023 21:39)  T(F): 97.8 (01 Feb 2023 08:23), Max: 98.6 (31 Jan 2023 21:39)  HR: 76 (01 Feb 2023 09:00) (72 - 91)  BP: 107/58 (01 Feb 2023 09:00) (107/58 - 133/69)  BP(mean): --  RR: 18 (01 Feb 2023 09:00) (18 - 18)  SpO2: 100% (01 Feb 2023 08:18) (98% - 100%)    Parameters below as of 01 Feb 2023 09:00  Patient On (Oxygen Delivery Method): nasal cannula  O2 Flow (L/min): 3        PHYSICAL EXAMINATION:    NECK:  Supple. No lymphadenopathy. Jugular venous pressure not elevated. Carotids equal.   HEART:   The cardiac impulse has a normal quality. Reg., Nl S1 and S2.  There are no murmurs, rubs or gallops noted  CHEST:  Chest is clear to auscultation. Normal respiratory effort.  ABDOMEN:  Soft and nontender.   EXTREMITIES:  There is no edema.       LABS:                        9.0    7.80  )-----------( 306      ( 01 Feb 2023 08:38 )             31.4     01-31    137  |  105  |  40<H>  ----------------------------<  107<H>  4.6   |  28  |  3.75<H>    Ca    8.7      31 Jan 2023 06:45  Phos  3.7     01-30  Mg     2.0     01-30    TPro  5.9<L>  /  Alb  2.0<L>  /  TBili  0.2  /  DBili  x   /  AST  21  /  ALT  26  /  AlkPhos  62  01-30    PT/INR - ( 30 Jan 2023 10:43 )   PT: 11.9 sec;   INR: 1.03 ratio         PTT - ( 30 Jan 2023 10:43 )  PTT:20.4 sec           Subjective:    pat no new complaint, lying in bed, 3lpm nc sat 100%.    Home Medications:  Albuterol (Eqv-Proventil HFA) 90 mcg/inh inhalation aerosol: 2 puff(s) inhaled every 6 hours, As Needed - for shortness of breath and/or wheezing (30 Jan 2023 12:02)  atorvastatin 40 mg oral tablet: 1 tab(s) orally once a day (in the evening) (30 Jan 2023 12:02)  bisacodyl 10 mg rectal suppository: 1 suppository(ies) rectally once a day, As Needed if no results from MOM (30 Jan 2023 12:02)  Breo Ellipta 200 mcg-25 mcg/inh inhalation powder: 1 puff(s) inhaled once a day (30 Jan 2023 12:02)  citalopram 20 mg oral tablet: 1 tab(s) orally once a day (30 Jan 2023 12:02)  Fleet Enema 19 g-7 g rectal enema: 133 milliliter(s) rectal once if no results from Dulcolax (30 Jan 2023 12:02)  hydrALAZINE 50 mg oral tablet: 1 tab(s) orally 3 times a day (30 Jan 2023 12:02)  levothyroxine 25 mcg (0.025 mg) oral tablet: 1 tab(s) orally once a day (30 Jan 2023 12:02)  Metoprolol Succinate  mg oral tablet, extended release: 1 tab(s) orally once a day (30 Jan 2023 12:02)  Milk of Magnesia 8% oral suspension: 30 milliliter(s) orally once a day (at bedtime), As Needed - for constipation (30 Jan 2023 12:02)  mirtazapine 7.5 mg oral tablet: 1 tab(s) orally once a day (at bedtime) (30 Jan 2023 12:02)  Multiple Vitamins oral tablet: 1 tab(s) orally once a day (30 Jan 2023 12:02)  pantoprazole 40 mg oral delayed release tablet: 1 tab(s) orally once a day (30 Jan 2023 12:02)  predniSONE 10 mg oral tablet: 1 tab(s) orally once a day until 2/1 (30 Jan 2023 12:02)  Spiriva 18 mcg inhalation capsule: 1 cap(s) inhaled once a day (30 Jan 2023 12:02)  tamsulosin 0.4 mg oral capsule: 1 cap(s) orally once a day (at bedtime) (30 Jan 2023 12:02)    MEDICATIONS  (STANDING):  atorvastatin 40 milliGRAM(s) Oral at bedtime  budesonide 160 MICROgram(s)/formoterol 4.5 MICROgram(s) Inhaler 2 Puff(s) Inhalation two times a day  citalopram 20 milliGRAM(s) Oral daily  heparin   Injectable 5000 Unit(s) SubCutaneous every 12 hours  hydrALAZINE 50 milliGRAM(s) Oral three times a day  levothyroxine 25 MICROGram(s) Oral daily  metoprolol succinate  milliGRAM(s) Oral daily  mirtazapine 7.5 milliGRAM(s) Oral at bedtime  pantoprazole    Tablet 40 milliGRAM(s) Oral before breakfast  predniSONE   Tablet 10 milliGRAM(s) Oral daily  tamsulosin 0.4 milliGRAM(s) Oral at bedtime  tiotropium 2.5 MICROgram(s) Inhaler 2 Puff(s) Inhalation daily    MEDICATIONS  (PRN):  acetaminophen     Tablet .. 650 milliGRAM(s) Oral every 6 hours PRN Temp greater or equal to 38C (100.4F), Mild Pain (1 - 3)  albuterol    90 MICROgram(s) HFA Inhaler 2 Puff(s) Inhalation every 6 hours PRN for shortness of breath and/or wheezing  melatonin 3 milliGRAM(s) Oral at bedtime PRN Insomnia  ondansetron Injectable 4 milliGRAM(s) IV Push every 8 hours PRN Nausea and/or Vomiting      Allergies    No Known Allergies    Intolerances        Vital Signs Last 24 Hrs  T(C): 36.6 (01 Feb 2023 08:23), Max: 37 (31 Jan 2023 21:39)  T(F): 97.8 (01 Feb 2023 08:23), Max: 98.6 (31 Jan 2023 21:39)  HR: 76 (01 Feb 2023 09:00) (72 - 91)  BP: 107/58 (01 Feb 2023 09:00) (107/58 - 133/69)  BP(mean): --  RR: 18 (01 Feb 2023 09:00) (18 - 18)  SpO2: 100% (01 Feb 2023 08:18) (98% - 100%)    Parameters below as of 01 Feb 2023 09:00  Patient On (Oxygen Delivery Method): nasal cannula  O2 Flow (L/min): 3        PHYSICAL EXAMINATION:    NECK:  Supple. No lymphadenopathy. Jugular venous pressure not elevated. Carotids equal.   HEART:   The cardiac impulse has a normal quality. Reg., Nl S1 and S2.  There are no murmurs, rubs or gallops noted  CHEST:  Chest crackles to auscultation. Normal respiratory effort.  ABDOMEN:  Soft and nontender.   EXTREMITIES:  There is no edema.       LABS:                        9.0    7.80  )-----------( 306      ( 01 Feb 2023 08:38 )             31.4     01-31    137  |  105  |  40<H>  ----------------------------<  107<H>  4.6   |  28  |  3.75<H>    Ca    8.7      31 Jan 2023 06:45  Phos  3.7     01-30  Mg     2.0     01-30    TPro  5.9<L>  /  Alb  2.0<L>  /  TBili  0.2  /  DBili  x   /  AST  21  /  ALT  26  /  AlkPhos  62  01-30    PT/INR - ( 30 Jan 2023 10:43 )   PT: 11.9 sec;   INR: 1.03 ratio         PTT - ( 30 Jan 2023 10:43 )  PTT:20.4 sec

## 2023-02-01 NOTE — CDI QUERY NOTE - NSCDIOTHERTXTBX_GEN_ALL_CORE_HH
Clinical documentation indicates that this patient has CHF.      Please include more specific documentation, either known or suspected, of the acuity, type and underlying cause of Heart Failure in your Progress Note and/or Discharge Summary.  - CHF ruled out  - Acute CHF ruled in please document type  - Cannot rule out acute chf please document CHF type  - Unable to determine  - Other ( Please specify)           SUPPORTING DOCUMENTATION AND/OR CLINICAL EVIDENCE:    No Echocardiogram on chart    < from: Xray Chest 1 View-PORTABLE IMMEDIATE (Xray Chest 1 View-PORTABLE IMMEDIATE .) (01.30.23 @ 12:16) >  IMPRESSION: CHF at this time. Left jugular line.      Infectious disease documentation on 1/31/2025:  76 y/o male with h/o CAD s/p stents x 4, COPD on home O2, ESRD on dialysis M/W/F was admitted on 1/30 from [facility] for worsening SOB x one day associated with runny nose. Denies fevers, chills. In ER she tested positive for COVID and received broad spectrum IV abx.     1. Acute on chronic respiratory failure. Multifocal pneumonia. CHF exacerbation. ESRD on HD. COPD exacerbation.  -s/p cefepime and azithromycin in ER  -respiratory frail  -she is not eligible for remdesivir therapy due to poor renal function  -no need for abx therpay at present time  -O2 therapy  -steroids        Medications Cardiovascular (MAR):   hydrALAZINE 50 milliGRAM(s) Oral three times a day  metoprolol succinate  milliGRAM(s) Oral daily Clinical documentation indicates that this patient has CHF.      Please include more specific documentation, either known or suspected, of the acuity, type and underlying cause of Heart Failure in your Progress Note and/or Discharge Summary.  - CHF ruled out  - Acute CHF ruled in please document type  - Cannot rule out acute chf please document CHF type  - Unable to determine  - Other ( Please specify)           SUPPORTING DOCUMENTATION AND/OR CLINICAL EVIDENCE:    Serum Pro-Brain Natriuretic Peptide: 04871 pg/mL (01.30.23 @ 10:43)     No Echocardiogram on chart    < from: Xray Chest 1 View-PORTABLE IMMEDIATE (Xray Chest 1 View-PORTABLE IMMEDIATE .) (01.30.23 @ 12:16) >  IMPRESSION: CHF at this time. Left jugular line.      Infectious disease documentation on 1/31/2025:  78 y/o male with h/o CAD s/p stents x 4, COPD on home O2, ESRD on dialysis M/W/F was admitted on 1/30 from [facility] for worsening SOB x one day associated with runny nose. Denies fevers, chills. In ER she tested positive for COVID and received broad spectrum IV abx.     1. Acute on chronic respiratory failure. Multifocal pneumonia. CHF exacerbation. ESRD on HD. COPD exacerbation.  -s/p cefepime and azithromycin in ER  -respiratory frail  -she is not eligible for remdesivir therapy due to poor renal function  -no need for abx therpay at present time  -O2 therapy  -steroids        Medications Cardiovascular (MAR):   hydrALAZINE 50 milliGRAM(s) Oral three times a day  metoprolol succinate  milliGRAM(s) Oral daily

## 2023-02-01 NOTE — PROGRESS NOTE ADULT - NSPROGADDITIONALINFOA_GEN_ALL_CORE
Will check CTA and venous duplex  if positive ness discuss with nephro on alternative solution such as apixiban low dose Will check CTA and venous duplex  if positive will discuss with nephro on alternative solution such as apixiban low dose

## 2023-02-02 LAB — APTT BLD: 29.5 SEC — SIGNIFICANT CHANGE UP (ref 27.5–35.5)

## 2023-02-02 PROCEDURE — 93970 EXTREMITY STUDY: CPT | Mod: 26

## 2023-02-02 PROCEDURE — 99233 SBSQ HOSP IP/OBS HIGH 50: CPT

## 2023-02-02 RX ORDER — APIXABAN 2.5 MG/1
2.5 TABLET, FILM COATED ORAL EVERY 12 HOURS
Refills: 0 | Status: DISCONTINUED | OUTPATIENT
Start: 2023-02-02 | End: 2023-02-02

## 2023-02-02 RX ORDER — WARFARIN SODIUM 2.5 MG/1
5 TABLET ORAL AT BEDTIME
Refills: 0 | Status: COMPLETED | OUTPATIENT
Start: 2023-02-02 | End: 2023-02-02

## 2023-02-02 RX ORDER — HEPARIN SODIUM 5000 [USP'U]/ML
2000 INJECTION INTRAVENOUS; SUBCUTANEOUS EVERY 6 HOURS
Refills: 0 | Status: DISCONTINUED | OUTPATIENT
Start: 2023-02-02 | End: 2023-02-03

## 2023-02-02 RX ORDER — HEPARIN SODIUM 5000 [USP'U]/ML
INJECTION INTRAVENOUS; SUBCUTANEOUS
Qty: 25000 | Refills: 0 | Status: DISCONTINUED | OUTPATIENT
Start: 2023-02-02 | End: 2023-02-03

## 2023-02-02 RX ORDER — HEPARIN SODIUM 5000 [USP'U]/ML
4500 INJECTION INTRAVENOUS; SUBCUTANEOUS EVERY 6 HOURS
Refills: 0 | Status: DISCONTINUED | OUTPATIENT
Start: 2023-02-02 | End: 2023-02-03

## 2023-02-02 RX ADMIN — Medication 50 MILLIGRAM(S): at 13:39

## 2023-02-02 RX ADMIN — BUDESONIDE AND FORMOTEROL FUMARATE DIHYDRATE 2 PUFF(S): 160; 4.5 AEROSOL RESPIRATORY (INHALATION) at 08:56

## 2023-02-02 RX ADMIN — MIRTAZAPINE 7.5 MILLIGRAM(S): 45 TABLET, ORALLY DISINTEGRATING ORAL at 21:53

## 2023-02-02 RX ADMIN — HEPARIN SODIUM 5000 UNIT(S): 5000 INJECTION INTRAVENOUS; SUBCUTANEOUS at 13:39

## 2023-02-02 RX ADMIN — TIOTROPIUM BROMIDE 2 PUFF(S): 18 CAPSULE ORAL; RESPIRATORY (INHALATION) at 08:56

## 2023-02-02 RX ADMIN — Medication 50 MILLIGRAM(S): at 05:51

## 2023-02-02 RX ADMIN — Medication 100 MILLIGRAM(S): at 09:57

## 2023-02-02 RX ADMIN — HEPARIN SODIUM 1100 UNIT(S)/HR: 5000 INJECTION INTRAVENOUS; SUBCUTANEOUS at 21:46

## 2023-02-02 RX ADMIN — CITALOPRAM 20 MILLIGRAM(S): 10 TABLET, FILM COATED ORAL at 09:57

## 2023-02-02 RX ADMIN — Medication 6 MILLIGRAM(S): at 09:57

## 2023-02-02 RX ADMIN — ATORVASTATIN CALCIUM 40 MILLIGRAM(S): 80 TABLET, FILM COATED ORAL at 21:51

## 2023-02-02 RX ADMIN — HEPARIN SODIUM 5000 UNIT(S): 5000 INJECTION INTRAVENOUS; SUBCUTANEOUS at 05:51

## 2023-02-02 RX ADMIN — TAMSULOSIN HYDROCHLORIDE 0.4 MILLIGRAM(S): 0.4 CAPSULE ORAL at 21:50

## 2023-02-02 RX ADMIN — Medication 25 MICROGRAM(S): at 05:51

## 2023-02-02 RX ADMIN — PANTOPRAZOLE SODIUM 40 MILLIGRAM(S): 20 TABLET, DELAYED RELEASE ORAL at 05:51

## 2023-02-02 RX ADMIN — WARFARIN SODIUM 5 MILLIGRAM(S): 2.5 TABLET ORAL at 21:50

## 2023-02-02 NOTE — PROGRESS NOTE ADULT - ASSESSMENT
PROBLEMS;    SOB multifactorial ESRD+COVID, pneumonia  COPD  HTN    PLAN:    pulmonary better  PO PREDNISONE-decd today  Home O2  Albuterol  HD  SUPPORTIVE CARE  DVT PROPHYLASIX PROBLEMS;    SOB multifactorial ESRD+COVID, pneumonia  COPD  HTN    PLAN:    pulmonary better  using his on breo  Home O2  Albuterol  HD  SUPPORTIVE CARE  DVT PROPHYLASIX

## 2023-02-02 NOTE — PROGRESS NOTE ADULT - ASSESSMENT
78 y/o male with a PMHx of CAD s/p stents x4, COPD, ESRD on dialysis M/W/F presents to the ED BIBA from [facility] c/o SOB starting this morning. +runny nose. Denies cough, CP, fevers, chills. Former smoker and with O2 at the NH. He has abnormal CXR and tested + for covid, received broad spectrum abx in ed; adm for further Rx. MOLST done dnr dni    Pt admitted with :    * SOB multifactorial ESRD, COVID, pneumonia  for HD today consult nephrology  no remdesivir per ID d/t renal function  monitor off ABX    * COPD  on home O2  Albuterol  Pulm consult  he was tapered off steroids last dose was supposed to be 2/1; consult pulm    * HTN  on high dose BB at home, hydralazine    dnr dni

## 2023-02-02 NOTE — PROGRESS NOTE ADULT - SUBJECTIVE AND OBJECTIVE BOX
Subjective:    Home Medications:  Albuterol (Eqv-Proventil HFA) 90 mcg/inh inhalation aerosol: 2 puff(s) inhaled every 6 hours, As Needed - for shortness of breath and/or wheezing (30 Jan 2023 12:02)  atorvastatin 40 mg oral tablet: 1 tab(s) orally once a day (in the evening) (30 Jan 2023 12:02)  bisacodyl 10 mg rectal suppository: 1 suppository(ies) rectally once a day, As Needed if no results from MOM (30 Jan 2023 12:02)  Breo Ellipta 200 mcg-25 mcg/inh inhalation powder: 1 puff(s) inhaled once a day (30 Jan 2023 12:02)  citalopram 20 mg oral tablet: 1 tab(s) orally once a day (30 Jan 2023 12:02)  Fleet Enema 19 g-7 g rectal enema: 133 milliliter(s) rectal once if no results from Dulcolax (30 Jan 2023 12:02)  hydrALAZINE 50 mg oral tablet: 1 tab(s) orally 3 times a day (30 Jan 2023 12:02)  levothyroxine 25 mcg (0.025 mg) oral tablet: 1 tab(s) orally once a day (30 Jan 2023 12:02)  Metoprolol Succinate  mg oral tablet, extended release: 1 tab(s) orally once a day (30 Jan 2023 12:02)  Milk of Magnesia 8% oral suspension: 30 milliliter(s) orally once a day (at bedtime), As Needed - for constipation (30 Jan 2023 12:02)  mirtazapine 7.5 mg oral tablet: 1 tab(s) orally once a day (at bedtime) (30 Jan 2023 12:02)  Multiple Vitamins oral tablet: 1 tab(s) orally once a day (30 Jan 2023 12:02)  pantoprazole 40 mg oral delayed release tablet: 1 tab(s) orally once a day (30 Jan 2023 12:02)  predniSONE 10 mg oral tablet: 1 tab(s) orally once a day until 2/1 (30 Jan 2023 12:02)  Spiriva 18 mcg inhalation capsule: 1 cap(s) inhaled once a day (30 Jan 2023 12:02)  tamsulosin 0.4 mg oral capsule: 1 cap(s) orally once a day (at bedtime) (30 Jan 2023 12:02)    MEDICATIONS  (STANDING):  atorvastatin 40 milliGRAM(s) Oral at bedtime  budesonide 160 MICROgram(s)/formoterol 4.5 MICROgram(s) Inhaler 2 Puff(s) Inhalation two times a day  citalopram 20 milliGRAM(s) Oral daily  dexAMETHasone     Tablet 6 milliGRAM(s) Oral daily  heparin   Injectable 5000 Unit(s) SubCutaneous every 8 hours  hydrALAZINE 50 milliGRAM(s) Oral three times a day  levothyroxine 25 MICROGram(s) Oral daily  metoprolol succinate  milliGRAM(s) Oral daily  mirtazapine 7.5 milliGRAM(s) Oral at bedtime  pantoprazole    Tablet 40 milliGRAM(s) Oral before breakfast  tamsulosin 0.4 milliGRAM(s) Oral at bedtime  tiotropium 2.5 MICROgram(s) Inhaler 2 Puff(s) Inhalation daily    MEDICATIONS  (PRN):  acetaminophen     Tablet .. 650 milliGRAM(s) Oral every 6 hours PRN Temp greater or equal to 38C (100.4F), Mild Pain (1 - 3)  albuterol    90 MICROgram(s) HFA Inhaler 2 Puff(s) Inhalation every 6 hours PRN for shortness of breath and/or wheezing  melatonin 3 milliGRAM(s) Oral at bedtime PRN Insomnia  ondansetron Injectable 4 milliGRAM(s) IV Push every 8 hours PRN Nausea and/or Vomiting      Allergies    No Known Allergies    Intolerances        Vital Signs Last 24 Hrs  T(C): 36.6 (02 Feb 2023 08:12), Max: 37.3 (01 Feb 2023 22:00)  T(F): 97.9 (02 Feb 2023 08:12), Max: 99.1 (01 Feb 2023 22:00)  HR: 90 (02 Feb 2023 08:12) (76 - 94)  BP: 116/64 (02 Feb 2023 08:12) (92/58 - 134/71)  BP(mean): --  RR: 18 (02 Feb 2023 08:12) (18 - 18)  SpO2: 96% (02 Feb 2023 08:12) (95% - 98%)    Parameters below as of 02 Feb 2023 08:12  Patient On (Oxygen Delivery Method): nasal cannula  O2 Flow (L/min): 2        PHYSICAL EXAMINATION:    NECK:  Supple. No lymphadenopathy. Jugular venous pressure not elevated. Carotids equal.   HEART:   The cardiac impulse has a normal quality. Reg., Nl S1 and S2.  There are no murmurs, rubs or gallops noted  CHEST:  Chest is clear to auscultation. Normal respiratory effort.  ABDOMEN:  Soft and nontender.   EXTREMITIES:  There is no edema.       LABS:                        9.0    7.80  )-----------( 306      ( 01 Feb 2023 08:38 )             31.4     02-01    138  |  107  |  61<H>  ----------------------------<  176<H>  4.2   |  25  |  5.24<H>    Ca    8.7      01 Feb 2023 08:38  Phos  3.1     02-01    TPro  x   /  Alb  1.9<L>  /  TBili  x   /  DBili  x   /  AST  x   /  ALT  x   /  AlkPhos  x   02-01    PT/INR - ( 01 Feb 2023 13:30 )   PT: 11.1 sec;   INR: 0.96 ratio         PTT - ( 01 Feb 2023 13:30 )  PTT:28.1 sec           Subjective:    pat better, lying in bed, no new complaint.    Home Medications:  Albuterol (Eqv-Proventil HFA) 90 mcg/inh inhalation aerosol: 2 puff(s) inhaled every 6 hours, As Needed - for shortness of breath and/or wheezing (30 Jan 2023 12:02)  atorvastatin 40 mg oral tablet: 1 tab(s) orally once a day (in the evening) (30 Jan 2023 12:02)  bisacodyl 10 mg rectal suppository: 1 suppository(ies) rectally once a day, As Needed if no results from MOM (30 Jan 2023 12:02)  Breo Ellipta 200 mcg-25 mcg/inh inhalation powder: 1 puff(s) inhaled once a day (30 Jan 2023 12:02)  citalopram 20 mg oral tablet: 1 tab(s) orally once a day (30 Jan 2023 12:02)  Fleet Enema 19 g-7 g rectal enema: 133 milliliter(s) rectal once if no results from Dulcolax (30 Jan 2023 12:02)  hydrALAZINE 50 mg oral tablet: 1 tab(s) orally 3 times a day (30 Jan 2023 12:02)  levothyroxine 25 mcg (0.025 mg) oral tablet: 1 tab(s) orally once a day (30 Jan 2023 12:02)  Metoprolol Succinate  mg oral tablet, extended release: 1 tab(s) orally once a day (30 Jan 2023 12:02)  Milk of Magnesia 8% oral suspension: 30 milliliter(s) orally once a day (at bedtime), As Needed - for constipation (30 Jan 2023 12:02)  mirtazapine 7.5 mg oral tablet: 1 tab(s) orally once a day (at bedtime) (30 Jan 2023 12:02)  Multiple Vitamins oral tablet: 1 tab(s) orally once a day (30 Jan 2023 12:02)  pantoprazole 40 mg oral delayed release tablet: 1 tab(s) orally once a day (30 Jan 2023 12:02)  predniSONE 10 mg oral tablet: 1 tab(s) orally once a day until 2/1 (30 Jan 2023 12:02)  Spiriva 18 mcg inhalation capsule: 1 cap(s) inhaled once a day (30 Jan 2023 12:02)  tamsulosin 0.4 mg oral capsule: 1 cap(s) orally once a day (at bedtime) (30 Jan 2023 12:02)    MEDICATIONS  (STANDING):  atorvastatin 40 milliGRAM(s) Oral at bedtime  budesonide 160 MICROgram(s)/formoterol 4.5 MICROgram(s) Inhaler 2 Puff(s) Inhalation two times a day  citalopram 20 milliGRAM(s) Oral daily  dexAMETHasone     Tablet 6 milliGRAM(s) Oral daily  heparin   Injectable 5000 Unit(s) SubCutaneous every 8 hours  hydrALAZINE 50 milliGRAM(s) Oral three times a day  levothyroxine 25 MICROGram(s) Oral daily  metoprolol succinate  milliGRAM(s) Oral daily  mirtazapine 7.5 milliGRAM(s) Oral at bedtime  pantoprazole    Tablet 40 milliGRAM(s) Oral before breakfast  tamsulosin 0.4 milliGRAM(s) Oral at bedtime  tiotropium 2.5 MICROgram(s) Inhaler 2 Puff(s) Inhalation daily    MEDICATIONS  (PRN):  acetaminophen     Tablet .. 650 milliGRAM(s) Oral every 6 hours PRN Temp greater or equal to 38C (100.4F), Mild Pain (1 - 3)  albuterol    90 MICROgram(s) HFA Inhaler 2 Puff(s) Inhalation every 6 hours PRN for shortness of breath and/or wheezing  melatonin 3 milliGRAM(s) Oral at bedtime PRN Insomnia  ondansetron Injectable 4 milliGRAM(s) IV Push every 8 hours PRN Nausea and/or Vomiting      Allergies    No Known Allergies    Intolerances        Vital Signs Last 24 Hrs  T(C): 36.6 (02 Feb 2023 08:12), Max: 37.3 (01 Feb 2023 22:00)  T(F): 97.9 (02 Feb 2023 08:12), Max: 99.1 (01 Feb 2023 22:00)  HR: 90 (02 Feb 2023 08:12) (76 - 94)  BP: 116/64 (02 Feb 2023 08:12) (92/58 - 134/71)  BP(mean): --  RR: 18 (02 Feb 2023 08:12) (18 - 18)  SpO2: 96% (02 Feb 2023 08:12) (95% - 98%)    Parameters below as of 02 Feb 2023 08:12  Patient On (Oxygen Delivery Method): nasal cannula  O2 Flow (L/min): 2        PHYSICAL EXAMINATION:    NECK:  Supple. No lymphadenopathy. Jugular venous pressure not elevated. Carotids equal.   HEART:   The cardiac impulse has a normal quality. Reg., Nl S1 and S2.  There are no murmurs, rubs or gallops noted  CHEST:  Chest crackles to auscultation. Normal respiratory effort.  ABDOMEN:  Soft and nontender.   EXTREMITIES:  There is no edema.       LABS:                        9.0    7.80  )-----------( 306      ( 01 Feb 2023 08:38 )             31.4     02-01    138  |  107  |  61<H>  ----------------------------<  176<H>  4.2   |  25  |  5.24<H>    Ca    8.7      01 Feb 2023 08:38  Phos  3.1     02-01    TPro  x   /  Alb  1.9<L>  /  TBili  x   /  DBili  x   /  AST  x   /  ALT  x   /  AlkPhos  x   02-01    PT/INR - ( 01 Feb 2023 13:30 )   PT: 11.1 sec;   INR: 0.96 ratio         PTT - ( 01 Feb 2023 13:30 )  PTT:28.1 sec

## 2023-02-02 NOTE — PROGRESS NOTE ADULT - SUBJECTIVE AND OBJECTIVE BOX
Patient seen and examined  feels much better  duplex + dvt  d/w nephro dcing heparin drip and starting low dose eliquis  now on room air  Review of Systems:  General:denies fever chills, headache, weakness  HEENT: denies blurry vision,diffculty swallowing, difficulty hearing, tinnitus  Cardiovascular: denies chest pain  ,palpitations  Pulmonary:denies shortness of breath, cough, wheezing, hemoptysis  Gastrointestinal: denies abdominal pain, constipation, diarrhea,nausea , vomiting, hematochezia  : denies hematuria, dysuria, or incontinence  Neurological: denies weakness, numbness , tingling, dizziness, tremors  MSK: denies muscle pain, difficulty ambulating, swelling, back pain  skin: denies skin rash, itching, burning, or  skin lesions  Psychiatrical: denies mood disturbances, anxierty, feeling depressed, depression , or difficulty sleeping    Objective:  Vitals  T(C): 37.3 (02-03-23 @ 04:51), Max: 37.4 (02-02-23 @ 20:24)  HR: 74 (02-03-23 @ 04:51) (74 - 90)  BP: 103/65 (02-03-23 @ 04:51) (100/65 - 118/69)  RR: 18 (02-03-23 @ 04:51) (18 - 18)  SpO2: 95% (02-03-23 @ 04:51) (93% - 96%)    Physical Exam:  General: comfortable, no acute distress, well nourished  HEENT: Atraumatic, no LAD, trachea midline, PERRLA  Cardiovascular: normal s1s2, no murmurs, gallops or fricition rubs  Pulmonary: clear to ausculation Bilaterally, no wheezing , rhonchi  Gastrointestinal: soft non tender non distended, no masses felt, no organomegally  Muscloskeletal: no lower extremity edema, intact bilateral lower extremity pulses  Neurological: CN II-12 intact. No focal weakness  Psychiatrical: normal mood, cooperative  SKIN: no rash, lesions or ulcers    Labs:                          8.9    6.81  )-----------( 228      ( 03 Feb 2023 03:59 )             29.4     02-01    138  |  107  |  61<H>  ----------------------------<  176<H>  4.2   |  25  |  5.24<H>    Ca    8.7      01 Feb 2023 08:38  Phos  3.1     02-01    TPro  x   /  Alb  1.9<L>  /  TBili  x   /  DBili  x   /  AST  x   /  ALT  x   /  AlkPhos  x   02-01    LIVER FUNCTIONS - ( 01 Feb 2023 08:38 )  Alb: 1.9 g/dL / Pro: x     / ALK PHOS: x     / ALT: x     / AST: x     / GGT: x           PT/INR - ( 01 Feb 2023 13:30 )   PT: 11.1 sec;   INR: 0.96 ratio         PTT - ( 03 Feb 2023 03:59 )  PTT:80.9 sec      Active Medications  MEDICATIONS  (STANDING):  atorvastatin 40 milliGRAM(s) Oral at bedtime  budesonide 160 MICROgram(s)/formoterol 4.5 MICROgram(s) Inhaler 2 Puff(s) Inhalation two times a day  citalopram 20 milliGRAM(s) Oral daily  dexAMETHasone     Tablet 6 milliGRAM(s) Oral daily  heparin  Infusion.  Unit(s)/Hr (11 mL/Hr) IV Continuous <Continuous>  hydrALAZINE 50 milliGRAM(s) Oral three times a day  levothyroxine 25 MICROGram(s) Oral daily  metoprolol succinate  milliGRAM(s) Oral daily  mirtazapine 7.5 milliGRAM(s) Oral at bedtime  pantoprazole    Tablet 40 milliGRAM(s) Oral before breakfast  tamsulosin 0.4 milliGRAM(s) Oral at bedtime  tiotropium 2.5 MICROgram(s) Inhaler 2 Puff(s) Inhalation daily    MEDICATIONS  (PRN):  acetaminophen     Tablet .. 650 milliGRAM(s) Oral every 6 hours PRN Temp greater or equal to 38C (100.4F), Mild Pain (1 - 3)  albuterol    90 MICROgram(s) HFA Inhaler 2 Puff(s) Inhalation every 6 hours PRN for shortness of breath and/or wheezing  heparin   Injectable 4500 Unit(s) IV Push every 6 hours PRN For aPTT less than 40  heparin   Injectable 2000 Unit(s) IV Push every 6 hours PRN For aPTT between 40 - 57  melatonin 3 milliGRAM(s) Oral at bedtime PRN Insomnia  ondansetron Injectable 4 milliGRAM(s) IV Push every 8 hours PRN Nausea and/or Vomiting

## 2023-02-02 NOTE — PROGRESS NOTE ADULT - NSPROGADDITIONALINFOA_GEN_ALL_CORE
ADDENDA: 2/2: 7pm  contacted by pharm. Very concerned on apixiban dosing. changed treatment of dvt back to heparin and warfarin bridge  white papers do suggest apixiban as an appropriate method of a/c  may need to reach out to anticoag team to determine best method of a/c for dc planning .(patient ambulatory with  a below knee dvt, possibly asa as per covid guidelines) ADDENDA: 2/2: 7pm  contacted by pharm. Very concerned on apixiban dosing. changed treatment of dvt back to heparin and warfarin bridge  white papers do suggest apixiban as an appropriate method of a/c for patients who ARE on on dialysis.  may need to reach out to anticoag team to determine best method of a/c for dc planning . below knee dvt, possibly asa as per covid guidelines)

## 2023-02-03 VITALS
RESPIRATION RATE: 19 BRPM | SYSTOLIC BLOOD PRESSURE: 122 MMHG | HEART RATE: 79 BPM | TEMPERATURE: 98 F | DIASTOLIC BLOOD PRESSURE: 63 MMHG | OXYGEN SATURATION: 100 %

## 2023-02-03 LAB
ALBUMIN SERPL ELPH-MCNC: 2 G/DL — LOW (ref 3.3–5)
ANION GAP SERPL CALC-SCNC: 8 MMOL/L — SIGNIFICANT CHANGE UP (ref 5–17)
APTT BLD: 80.9 SEC — HIGH (ref 27.5–35.5)
BUN SERPL-MCNC: 68 MG/DL — HIGH (ref 7–23)
CALCIUM SERPL-MCNC: 8.7 MG/DL — SIGNIFICANT CHANGE UP (ref 8.5–10.1)
CHLORIDE SERPL-SCNC: 102 MMOL/L — SIGNIFICANT CHANGE UP (ref 96–108)
CO2 SERPL-SCNC: 25 MMOL/L — SIGNIFICANT CHANGE UP (ref 22–31)
CREAT SERPL-MCNC: 5.31 MG/DL — HIGH (ref 0.5–1.3)
EGFR: 10 ML/MIN/1.73M2 — LOW
GLUCOSE SERPL-MCNC: 157 MG/DL — HIGH (ref 70–99)
HCT VFR BLD CALC: 29.4 % — LOW (ref 39–50)
HGB BLD-MCNC: 8.9 G/DL — LOW (ref 13–17)
MCHC RBC-ENTMCNC: 30.1 PG — SIGNIFICANT CHANGE UP (ref 27–34)
MCHC RBC-ENTMCNC: 30.3 GM/DL — LOW (ref 32–36)
MCV RBC AUTO: 99.3 FL — SIGNIFICANT CHANGE UP (ref 80–100)
PHOSPHATE SERPL-MCNC: 3.4 MG/DL — SIGNIFICANT CHANGE UP (ref 2.5–4.5)
PLATELET # BLD AUTO: 228 K/UL — SIGNIFICANT CHANGE UP (ref 150–400)
POTASSIUM SERPL-MCNC: 4.1 MMOL/L — SIGNIFICANT CHANGE UP (ref 3.5–5.3)
POTASSIUM SERPL-SCNC: 4.1 MMOL/L — SIGNIFICANT CHANGE UP (ref 3.5–5.3)
RBC # BLD: 2.96 M/UL — LOW (ref 4.2–5.8)
RBC # FLD: 17.8 % — HIGH (ref 10.3–14.5)
SARS-COV-2 RNA SPEC QL NAA+PROBE: DETECTED
SODIUM SERPL-SCNC: 135 MMOL/L — SIGNIFICANT CHANGE UP (ref 135–145)
WBC # BLD: 6.81 K/UL — SIGNIFICANT CHANGE UP (ref 3.8–10.5)
WBC # FLD AUTO: 6.81 K/UL — SIGNIFICANT CHANGE UP (ref 3.8–10.5)

## 2023-02-03 PROCEDURE — 99239 HOSP IP/OBS DSCHRG MGMT >30: CPT

## 2023-02-03 PROCEDURE — 99221 1ST HOSP IP/OBS SF/LOW 40: CPT

## 2023-02-03 RX ORDER — APIXABAN 2.5 MG/1
10 TABLET, FILM COATED ORAL EVERY 12 HOURS
Refills: 0 | Status: DISCONTINUED | OUTPATIENT
Start: 2023-02-03 | End: 2023-02-03

## 2023-02-03 RX ORDER — APIXABAN 2.5 MG/1
2 TABLET, FILM COATED ORAL
Qty: 0 | Refills: 0 | DISCHARGE
Start: 2023-02-03

## 2023-02-03 RX ORDER — APIXABAN 2.5 MG/1
1 TABLET, FILM COATED ORAL
Qty: 60 | Refills: 0
Start: 2023-02-03 | End: 2023-03-04

## 2023-02-03 RX ORDER — ATORVASTATIN CALCIUM 80 MG/1
1 TABLET, FILM COATED ORAL
Qty: 0 | Refills: 0 | DISCHARGE

## 2023-02-03 RX ORDER — MAGNESIUM HYDROXIDE 400 MG/1
30 TABLET, CHEWABLE ORAL
Qty: 0 | Refills: 0 | DISCHARGE

## 2023-02-03 RX ORDER — ATORVASTATIN CALCIUM 80 MG/1
1 TABLET, FILM COATED ORAL
Qty: 0 | Refills: 0 | DISCHARGE
Start: 2023-02-03

## 2023-02-03 RX ADMIN — HEPARIN SODIUM 1100 UNIT(S)/HR: 5000 INJECTION INTRAVENOUS; SUBCUTANEOUS at 07:36

## 2023-02-03 RX ADMIN — HEPARIN SODIUM 1100 UNIT(S)/HR: 5000 INJECTION INTRAVENOUS; SUBCUTANEOUS at 08:21

## 2023-02-03 RX ADMIN — Medication 6 MILLIGRAM(S): at 13:22

## 2023-02-03 RX ADMIN — PANTOPRAZOLE SODIUM 40 MILLIGRAM(S): 20 TABLET, DELAYED RELEASE ORAL at 06:23

## 2023-02-03 RX ADMIN — Medication 25 MICROGRAM(S): at 05:02

## 2023-02-03 RX ADMIN — HEPARIN SODIUM 1100 UNIT(S)/HR: 5000 INJECTION INTRAVENOUS; SUBCUTANEOUS at 05:01

## 2023-02-03 RX ADMIN — Medication 100 MILLIGRAM(S): at 13:21

## 2023-02-03 RX ADMIN — APIXABAN 10 MILLIGRAM(S): 2.5 TABLET, FILM COATED ORAL at 13:21

## 2023-02-03 RX ADMIN — CITALOPRAM 20 MILLIGRAM(S): 10 TABLET, FILM COATED ORAL at 13:20

## 2023-02-03 NOTE — DISCHARGE NOTE PROVIDER - NSDCMRMEDTOKEN_GEN_ALL_CORE_FT
Albuterol (Eqv-Proventil HFA) 90 mcg/inh inhalation aerosol: 2 puff(s) inhaled every 6 hours, As Needed - for shortness of breath and/or wheezing  apixaban 5 mg oral tablet: 2 tab(s) orally every 12 hours x 7 days 2/3/2023 to 2/9/2023 from 2/10/2023 take 5mg  every 12 hours      atorvastatin 40 mg oral tablet: 1 tab(s) orally once a day (at bedtime)  bisacodyl 10 mg rectal suppository: 1 suppository(ies) rectally once a day, As Needed if no results from MOM  Breo Ellipta 200 mcg-25 mcg/inh inhalation powder: 1 puff(s) inhaled once a day  citalopram 20 mg oral tablet: 1 tab(s) orally once a day  Fleet Enema 19 g-7 g rectal enema: 133 milliliter(s) rectal once if no results from Dulcolax  hydrALAZINE 50 mg oral tablet: 1 tab(s) orally 3 times a day  levothyroxine 25 mcg (0.025 mg) oral tablet: 1 tab(s) orally once a day  Metoprolol Succinate  mg oral tablet, extended release: 1 tab(s) orally once a day  mirtazapine 7.5 mg oral tablet: 1 tab(s) orally once a day (at bedtime)  Multiple Vitamins oral tablet: 1 tab(s) orally once a day  pantoprazole 40 mg oral delayed release tablet: 1 tab(s) orally once a day  Spiriva 18 mcg inhalation capsule: 1 cap(s) inhaled once a day  tamsulosin 0.4 mg oral capsule: 1 cap(s) orally once a day (at bedtime)

## 2023-02-03 NOTE — DISCHARGE NOTE NURSING/CASE MANAGEMENT/SOCIAL WORK - PATIENT PORTAL LINK FT
You can access the FollowMyHealth Patient Portal offered by [facility] by registering at the following website: [email or web address]. By joining [employer]’s FollowMyHealth portal, you will also be able to view your health information using other applications (apps) compatible with our system.

## 2023-02-03 NOTE — CONSULT NOTE ADULT - ASSESSMENT
78 y/o male with a PMHx of CAD s/p stents x4, COPD, ESRD on dialysis M/W/F presents to the ED [facility] from [facility] c/o SOB + for covid, also found to have Left LE acute DVT. Consulted by Hospitalist services    for VTE prophylaxis, risk stratification, and anticoagulation management. Patient is on HD for DVT treatment no dose adjustment for Eliquis advise patient to closely f/u with his PCP     Plan  Continue Eliquis 10 mg po every 12 hours x 7 days 2/3/2023 to 2/9/2023 from 2/9/2023 take 5mg  every 12 hours    daily CBC, BMP  BLE Venodynes  INC mobility as mayda    Thank you for the consult, will sign off advise patient to closely f/u with his pCP out patient    76 y/o male with a PMHx of CAD s/p stents x4, COPD, ESRD on dialysis M/W/F presents to the ED [facility] from [facility] c/o SOB + for covid, also found to have Left LE acute DVT. Consulted by Hospitalist services    for VTE prophylaxis, risk stratification, and anticoagulation management. Patient is on HD for DVT treatment no dose adjustment for Eliquis advise patient to closely f/u with his PCP     Plan  Continue Eliquis 10 mg po every 12 hours x 7 days 2/3/2023 to 2/9/2023 from 2/10/2023 take 5mg  every 12 hours    daily CBC, BMP  BLE Venodynes  INC mobility as mayda    Thank you for the consult, will sign off advise patient to closely f/u with his pCP out patient

## 2023-02-03 NOTE — DISCHARGE NOTE PROVIDER - PROVIDER TOKENS
FREE:[LAST:[your primary care physician and your nephrologist a],PHONE:[(   )    -],FAX:[(   )    -]]

## 2023-02-03 NOTE — PROGRESS NOTE ADULT - ASSESSMENT
PROBLEMS;    SOB multifactorial ESRD+COVID, pneumonia  COPD  HTN    PLAN:    pulmonary better  using his on breo  Home O2  Albuterol  HD  SUPPORTIVE CARE  DVT PROPHYLASIX PROBLEMS;    SOB multifactorial ESRD+COVID, pneumonia  COPD  HTN    PLAN:    On iv heparin will change to po eliquis &  will decd home  using his on breo  Home O2  Albuterol  HD  SUPPORTIVE CARE  DVT PROPHYLASIX

## 2023-02-03 NOTE — PROGRESS NOTE ADULT - SUBJECTIVE AND OBJECTIVE BOX
NEPHROLOGY INTERVAL HPI/OVERNIGHT EVENTS:    2/3- seen on HD , not tolerating the fluid removal, will reduce the goal, d/w Nursing, + DVT, NO PE on recent Angio  02-01-23 @ 11:32    2/1 seen at hd, appears comf  HPI:  76 y/o male with a PMHx of CAD s/p stents x4, COPD, ESRD on dialysis M/W/F presents to the ED BIBA from [facility] c/o SOB starting this morning. +runny nose. Denies cough, CP, fevers, chills. Former smoker and with O2 at the NH. He has abnormal CXR and tested + for covid, received broad spectrum abx in ed; adm for further Rx. MOLST done dnr dni (30 Jan 2023 13:09)      Patient is a 77y old  Male who presents with a chief complaint of sob (30 Jan 2023 13:09)  ----  pt poor historian   has hx ofesrd for past 8 years ( doesnot know his nephro) hd at [facility]   recent admission to hospt for Suicide attempt with attempt of cuttting his AVF site  now hd via TDC   send from [facility] with sob  with hx of severe copd on o2       PAST MEDICAL & SURGICAL HISTORY:  -esrd f [facility] now [facility]dc   -suicide attempt with cutting of avf site  - COPD  - CAD      MEDICATIONS  (STANDING):  atorvastatin 40 milliGRAM(s) Oral at bedtime  budesonide 160 MICROgram(s)/formoterol 4.5 MICROgram(s) Inhaler 2 Puff(s) Inhalation two times a day  citalopram 20 milliGRAM(s) Oral daily  heparin  Infusion.  Unit(s)/Hr (11 mL/Hr) IV Continuous <Continuous>  hydrALAZINE 50 milliGRAM(s) Oral three times a day  levothyroxine 25 MICROGram(s) Oral daily  metoprolol succinate  milliGRAM(s) Oral daily  mirtazapine 7.5 milliGRAM(s) Oral at bedtime  pantoprazole    Tablet 40 milliGRAM(s) Oral before breakfast  tamsulosin 0.4 milliGRAM(s) Oral at bedtime  tiotropium 2.5 MICROgram(s) Inhaler 2 Puff(s) Inhalation daily    MEDICATIONS  (PRN):  acetaminophen     Tablet .. 650 milliGRAM(s) Oral every 6 hours PRN Temp greater or equal to 38C (100.4F), Mild Pain (1 - 3)  albuterol    90 MICROgram(s) HFA Inhaler 2 Puff(s) Inhalation every 6 hours PRN for shortness of breath and/or wheezing  heparin   Injectable 5000 Unit(s) IV Push every 6 hours PRN For aPTT less than 40  heparin   Injectable 2500 Unit(s) IV Push every 6 hours PRN For aPTT between 40 - 57  melatonin 3 milliGRAM(s) Oral at bedtime PRN Insomnia  ondansetron Injectable 4 milliGRAM(s) IV Push every 8 hours PRN Nausea and/or Vomiting      Allergies    No Known Allergies    Intolerances        I&O's Detail    Patient was seen and evaluated on dialysis.   Patient is tolerating the procedure well.   Continue dialysis:   Dialyzer:   revaclear 300 k protocol uf goal of 3.6 kg   tdc bfr 350-400    Vital Signs Last 24 Hrs  T(C): 36.6 (01 Feb 2023 08:23), Max: 37 (31 Jan 2023 21:39)  T(F): 97.8 (01 Feb 2023 08:23), Max: 98.6 (31 Jan 2023 21:39)  HR: 87 (01 Feb 2023 11:30) (72 - 87)  BP: 97/55 (01 Feb 2023 11:30) (92/58 - 133/69)  BP(mean): --  RR: 18 (01 Feb 2023 11:30) (18 - 18)  SpO2: 100% (01 Feb 2023 08:18) (98% - 100%)    Parameters below as of 01 Feb 2023 11:30  Patient On (Oxygen Delivery Method): nasal cannula  O2 Flow (L/min): 3    Daily     Daily     PHYSICAL EXAM:  General: alert. awake NAD  HEENT: MMM  CV: s1s2 rrr  LUNGS: B/L CTA  EXT: no edema    LABS:                          8.9    6.81  )-----------( 228      ( 03 Feb 2023 03:59 )             29.4                           9.0    7.80  )-----------( 306      ( 01 Feb 2023 08:38 )             31.4     02-03    135  |  102  |  68<H>  ----------------------------<  157<H>  4.1   |  25  |  5.31<H>    Ca    8.7      03 Feb 2023 08:21  Phos  3.4     02-03    TPro  x   /  Alb  2.0<L>  /  TBili  x   /  DBili  x   /  AST  x   /  ALT  x   /  AlkPhos  x   02-03 02-01    138  |  107  |  61<H>  ----------------------------<  176<H>  4.2   |  25  |  5.24<H>    Ca    8.7      01 Feb 2023 08:38  Phos  3.1     02-01    TPro  x   /  Alb  1.9<L>  /  TBili  x   /  DBili  x   /  AST  x   /  ALT  x   /  AlkPhos  x   02-01        Phosphorus Level, Serum: 3.1 mg/dL (02-01 @ 08:38)

## 2023-02-03 NOTE — PROGRESS NOTE ADULT - PROVIDER SPECIALTY LIST ADULT
Nephrology
Hospitalist
Internal Medicine
Nephrology
Pulmonology
Pulmonology
Internal Medicine
Pulmonology

## 2023-02-03 NOTE — DISCHARGE NOTE PROVIDER - NSDCCAREPROVSEEN_GEN_ALL_CORE_FT
Joy, Andrés Rios, Misty Fraser, Brittany Henson, Amalia Parker, Curtis Morales, Nik Torres, Renea Garcia, Norma Randolph, Karla Geiger, Coleman Odell, Suk-Hyeon

## 2023-02-03 NOTE — DISCHARGE NOTE PROVIDER - DETAILS OF MALNUTRITION DIAGNOSIS/DIAGNOSES
This patient has been assessed with a concern for Malnutrition and was treated during this hospitalization for the following Nutrition diagnosis/diagnoses:     -  01/31/2023: Moderate protein-calorie malnutrition

## 2023-02-03 NOTE — DISCHARGE NOTE PROVIDER - HOSPITAL COURSE
78 y/o male with a PMHx of CAD s/p stents x4, COPD, ESRD on dialysis M/W/F presents to the ED BIBA from [facility] c/o SOB starting this morning. +runny nose. Denies cough, CP, fevers, chills. Former smoker and with O2 at the NH. He has abnormal CXR and tested + for covid, received broad spectrum abx in ed; adm for further Rx.   aptietn started on decadron.   hospital course complicated with acute left peroneal DVT- starte don heparin gtt- changed to xarelto on DC.     2/3- no acute overnight events no new complaints.     Vital Signs Last 24 Hrs  T(C): 36.6 (03 Feb 2023 12:30), Max: 37.4 (02 Feb 2023 20:24)  T(F): 97.8 (03 Feb 2023 12:30), Max: 99.3 (02 Feb 2023 20:24)  HR: 72 (03 Feb 2023 13:21) (68 - 83)  BP: 138/75 (03 Feb 2023 13:21) (80/47 - 142/81)  BP(mean): --  RR: 17 (03 Feb 2023 12:30) (16 - 18)  SpO2: 98% (03 Feb 2023 12:30) (93% - 99%)    Parameters below as of 03 Feb 2023 12:30  Patient On (Oxygen Delivery Method): nasal cannula  O2 Flow (L/min): 2  ROS:   All 10 systems reviewed and found to be negative with the exception of what has been described above.   PE:  Constitutional: NAD, laying in bed  HEENT: NC/AT  Back: no tenderness  Respiratory: respirations even and non labored, LCTA  Cardiovascular: S1S2 regular, no murmurs  Abdomen: soft, not tender, not distended, positive BS  Musculoskeletal: no muscle tenderness, no joint swelling or tenderness  Extremities: no pedal edema   Neurological: no focal deficits      PLAN   * SOB multifactorial ESRD, COVID, pneumonia  for HD  consult nephrology  no remdesivir per ID d/t renal function  monitor off ABX  on supplemental O2      *left peroneal DVT  - on heparin - dc  - AC consulted  - started on Eliquis DVT dosing     * COPD  on home O2  Albuterol  Pulm consult appreciated       * HTN  on high dose BB at home, hydralazine    dnr dni    Plan for dc back to rehab.

## 2023-02-03 NOTE — DISCHARGE NOTE PROVIDER - CARE PROVIDER_API CALL
your primary care physician and your nephrologist a,   Phone: (   )    -  Fax: (   )    -  Follow Up Time:

## 2023-02-03 NOTE — DISCHARGE NOTE PROVIDER - NSDCCPCAREPLAN_GEN_ALL_CORE_FT
PRINCIPAL DISCHARGE DIAGNOSIS  Diagnosis: COPD exacerbation  Assessment and Plan of Treatment: f/u with pulmonologist as an outpatient  c/w inhalers  COVID- follow isolation guidelines as per CDC  *Acute DVT  - you are starte don a blood thinner called Eliquis  - notify all your health care providers that you are on a blood thinner.

## 2023-02-03 NOTE — CONSULT NOTE ADULT - SUBJECTIVE AND OBJECTIVE BOX
HPI:  78 y/o male with a PMHx of CAD s/p stents x4, COPD, ESRD on dialysis M/W/F presents to the ED BIBA from [facility] c/o SOB starting this morning. +runny nose. Denies cough, CP, fevers, chills. Former smoker and with O2 at the NH. He has abnormal CXR and tested + for covid, received broad spectrum abx in ed; adm for further Rx. MOLST done dnr dni (30 Jan 2023 13:09)      Patient is a 77y old  Male who presents with a chief complaint of sob (03 Feb 2023 10:49)      Consulted by Hospitalist services    for VTE prophylaxis, risk stratification, and anticoagulation management.    PAST MEDICAL & SURGICAL HISTORY:  COPD (chronic obstructive pulmonary disease)      CAD (coronary artery disease)      ESRD on dialysis    Interval History  2/3/2023: patient seen in HD, on isolation for COVID, discussed the DVT diagnosis and the need for extended AC, patient with h/o ESRD will use Eliquis as anticoagulation, discussed with patient all questions answered.           CrCl:9.1  BMI:17.9      IMPROVE VTE Risk Score:IMPROVE VTE Individual Risk Assessment    RISK                                                                Points    [x  ] Previous VTE                                                  3    [  ] Thrombophilia                                               2    [  ] Lower limb paralysis                                      2        (unable to hold up >15 seconds)      [  ] Current Cancer                                              2         (within 6 months)    [  ] Immobilization > 24 hrs                                1    [  ] ICU/CCU stay > 24 hours                              1    [x  ] Age > 60                                                      1    IMPROVE VTE Score ______4___    IMPROVE Score 0-1: Low Risk, No VTE prophylaxis required for most patients, encourage ambulation.   IMPROVE Score 2-3: At risk, pharmacologic VTE prophylaxis is indicated for most patients (in the absence of a contraindication)  IMPROVE Score > or = 4: High Risk, pharmacologic VTE prophylaxis is indicated for most patients (in the absence of a contraindication)      IMPROVE Bleeding Risk Score:5    Falls Risk:   High (  )  Mod (  )  Low (  )      FAMILY HISTORY:    Denies any personal or familial history of clotting or bleeding disorders.    Allergies    No Known Allergies    Intolerances        REVIEW OF SYSTEMS    (  )Fever	     (  )Constipation	(  )SOB				(  )Headache	(  )Dysuria  (  )Chills	     (  )Melena	(  )Dyspnea present on exertion	                    (  )Dizziness                    (  )Polyuria  (  )Nausea	     (  )Hematochezia	(  )Cough			                    (  )Syncope   	(  )Hematuria  (  )Vomiting    (  )Chest Pain	(  )Wheezing			(  )Weakness  (  )Diarrhea     (  )Palpitations	(  )Anorexia			(  )joint pain    All  other review of systems negative: Yes    Vital Signs Last 24 Hrs  T(C): 36.6 (03 Feb 2023 08:30), Max: 37.4 (02 Feb 2023 20:24)  T(F): 97.8 (03 Feb 2023 08:30), Max: 99.3 (02 Feb 2023 20:24)  HR: 75 (03 Feb 2023 11:00) (74 - 83)  BP: 96/56 (03 Feb 2023 11:00) (80/47 - 142/81)  BP(mean): --  RR: 17 (03 Feb 2023 11:00) (16 - 18)  SpO2: 99% (03 Feb 2023 08:55) (93% - 99%)    PHYSICAL EXAM:    Constitutional: Appears Well    Neurological: A& O x 3    Skin: Warm    Respiratory and Thorax: normal effort; Breath sounds: normal; No rales/wheezing/rhonchi  	  Cardiovascular: S1, S2, regular, NMBR	    Gastrointestinal: BS + x 4Q, nontender	    Genitourinary:  Bladder nondistended, nontender    Musculoskeletal:   General Right:   no muscle/joint tenderness,   normal tone, no joint swelling,   ROM: full	    General Left:   no muscle/joint tenderness,   normal tone, no joint swelling,   ROM: lfull      Lower extrems:   Right: no calf tenderness              negative elle's sign               + pedal pulses    Left:   + calf tenderness              + elle's sign               + pedal pulses                          8.9    6.81  )-----------( 228      ( 03 Feb 2023 03:59 )             29.4       02-03    135  |  102  |  68<H>  ----------------------------<  157<H>  4.1   |  25  |  5.31<H>    Ca    8.7      03 Feb 2023 08:21  Phos  3.4     02-03    TPro  x   /  Alb  2.0<L>  /  TBili  x   /  DBili  x   /  AST  x   /  ALT  x   /  AlkPhos  x   02-03      PT/INR - ( 01 Feb 2023 13:30 )   PT: 11.1 sec;   INR: 0.96 ratio         PTT - ( 03 Feb 2023 03:59 )  PTT:80.9 sec				    MEDICATIONS  (STANDING):  apixaban 10 milliGRAM(s) Oral every 12 hours  atorvastatin 40 milliGRAM(s) Oral at bedtime  budesonide 160 MICROgram(s)/formoterol 4.5 MICROgram(s) Inhaler 2 Puff(s) Inhalation two times a day  citalopram 20 milliGRAM(s) Oral daily  dexAMETHasone     Tablet 6 milliGRAM(s) Oral daily  hydrALAZINE 50 milliGRAM(s) Oral three times a day  levothyroxine 25 MICROGram(s) Oral daily  metoprolol succinate  milliGRAM(s) Oral daily  mirtazapine 7.5 milliGRAM(s) Oral at bedtime  pantoprazole    Tablet 40 milliGRAM(s) Oral before breakfast  tamsulosin 0.4 milliGRAM(s) Oral at bedtime  tiotropium 2.5 MICROgram(s) Inhaler 2 Puff(s) Inhalation daily    < from: CT Angio Chest PE Protocol w/ IV Cont (02.01.23 @ 20:56) >  MPRESSION:    No pulmonary embolus.    Bibasilar consolidations and areas of mucoid impaction as described   above. A follow-up CT chest is recommended in 4-6 weeks to ensure   resolution.    < end of copied text >  < from: US Duplex Venous Lower Ext Complete, Bilateral (02.02.23 @ 12:24) >  IMPRESSION:    Deep venous thrombosis in the left peroneal vein.    No evidence of deep venous thrombosis in the right lower extremity.    < end of copied text >        DVT Prophylaxis:  LMWH                   (  )  Heparin SQ           (  )  Coumadin             (  )  Xarelto                  (  )  Eliquis                   (x  )  Venodynes           (x  )  Ambulation          (x  )  UFH                       (  )  Contraindicated  (  )  EC ASPIRIN       (  )           Class II - visualization of the soft palate, fauces, and uvula

## 2023-02-03 NOTE — PROGRESS NOTE ADULT - ASSESSMENT
76 y/o male with a PMHx of CAD s/p stents x4, COPD, ESRD on dialysis M/W/F presents to the ED [facility] from [facility] c/o SOB starting this morning. +runny nose. Denies cough, CP, fevers, chills. Former smoker and with O2 at the N  esrd mwf at [facility]   cxr with CHF admitted with COPD exaceb with hx of severe COPD on o2 at home   hx of suicide attempt with cutting of avf recently   covid pos    REC  - hd today for 3.5 hrs via tdc with inc uf with hd   - anemia nehemiah with hd     2/1 MK   - esrd tolerating hd   - COPD exxac with covid pna     supportive care, steroids   - anemia nehemiah with hd to start 2/3  no renal barrier for dc       2/3  + DVT  Low BP on HD , lowered the UF goal  No PE on Angio  may be dcd from Renal standpoint

## 2023-02-03 NOTE — PROGRESS NOTE ADULT - NUTRITIONAL ASSESSMENT
This patient has been assessed with a concern for Malnutrition and has been determined to have a diagnosis/diagnoses of Moderate protein-calorie malnutrition.    This patient is being managed with:   Diet Renal Restrictions-  For patients receiving Renal Replacement - No Protein Restr No Conc K No Conc Phos Low Sodium  Entered: Jan 30 2023 11:03PM    

## 2023-02-03 NOTE — PROGRESS NOTE ADULT - SUBJECTIVE AND OBJECTIVE BOX
Subjective:    Home Medications:  Albuterol (Eqv-Proventil HFA) 90 mcg/inh inhalation aerosol: 2 puff(s) inhaled every 6 hours, As Needed - for shortness of breath and/or wheezing (30 Jan 2023 12:02)  atorvastatin 40 mg oral tablet: 1 tab(s) orally once a day (at bedtime) (03 Feb 2023 06:59)  bisacodyl 10 mg rectal suppository: 1 suppository(ies) rectally once a day, As Needed if no results from MOM (30 Jan 2023 12:02)  Breo Ellipta 200 mcg-25 mcg/inh inhalation powder: 1 puff(s) inhaled once a day (30 Jan 2023 12:02)  citalopram 20 mg oral tablet: 1 tab(s) orally once a day (30 Jan 2023 12:02)  Fleet Enema 19 g-7 g rectal enema: 133 milliliter(s) rectal once if no results from Dulcolax (30 Jan 2023 12:02)  hydrALAZINE 50 mg oral tablet: 1 tab(s) orally 3 times a day (30 Jan 2023 12:02)  levothyroxine 25 mcg (0.025 mg) oral tablet: 1 tab(s) orally once a day (30 Jan 2023 12:02)  Metoprolol Succinate  mg oral tablet, extended release: 1 tab(s) orally once a day (30 Jan 2023 12:02)  mirtazapine 7.5 mg oral tablet: 1 tab(s) orally once a day (at bedtime) (30 Jan 2023 12:02)  Multiple Vitamins oral tablet: 1 tab(s) orally once a day (30 Jan 2023 12:02)  pantoprazole 40 mg oral delayed release tablet: 1 tab(s) orally once a day (30 Jan 2023 12:02)  Spiriva 18 mcg inhalation capsule: 1 cap(s) inhaled once a day (30 Jan 2023 12:02)  tamsulosin 0.4 mg oral capsule: 1 cap(s) orally once a day (at bedtime) (30 Jan 2023 12:02)    MEDICATIONS  (STANDING):  apixaban 10 milliGRAM(s) Oral every 12 hours  atorvastatin 40 milliGRAM(s) Oral at bedtime  budesonide 160 MICROgram(s)/formoterol 4.5 MICROgram(s) Inhaler 2 Puff(s) Inhalation two times a day  citalopram 20 milliGRAM(s) Oral daily  dexAMETHasone     Tablet 6 milliGRAM(s) Oral daily  heparin  Infusion.  Unit(s)/Hr (11 mL/Hr) IV Continuous <Continuous>  hydrALAZINE 50 milliGRAM(s) Oral three times a day  levothyroxine 25 MICROGram(s) Oral daily  metoprolol succinate  milliGRAM(s) Oral daily  mirtazapine 7.5 milliGRAM(s) Oral at bedtime  pantoprazole    Tablet 40 milliGRAM(s) Oral before breakfast  tamsulosin 0.4 milliGRAM(s) Oral at bedtime  tiotropium 2.5 MICROgram(s) Inhaler 2 Puff(s) Inhalation daily    MEDICATIONS  (PRN):  acetaminophen     Tablet .. 650 milliGRAM(s) Oral every 6 hours PRN Temp greater or equal to 38C (100.4F), Mild Pain (1 - 3)  albuterol    90 MICROgram(s) HFA Inhaler 2 Puff(s) Inhalation every 6 hours PRN for shortness of breath and/or wheezing  heparin   Injectable 4500 Unit(s) IV Push every 6 hours PRN For aPTT less than 40  heparin   Injectable 2000 Unit(s) IV Push every 6 hours PRN For aPTT between 40 - 57  melatonin 3 milliGRAM(s) Oral at bedtime PRN Insomnia  ondansetron Injectable 4 milliGRAM(s) IV Push every 8 hours PRN Nausea and/or Vomiting      Allergies    No Known Allergies    Intolerances        Vital Signs Last 24 Hrs  T(C): 37.3 (03 Feb 2023 04:51), Max: 37.4 (02 Feb 2023 20:24)  T(F): 99.1 (03 Feb 2023 04:51), Max: 99.3 (02 Feb 2023 20:24)  HR: 74 (03 Feb 2023 04:51) (74 - 83)  BP: 103/65 (03 Feb 2023 04:51) (100/65 - 118/69)  BP(mean): --  RR: 18 (03 Feb 2023 04:51) (18 - 18)  SpO2: 95% (03 Feb 2023 04:51) (93% - 95%)    Parameters below as of 03 Feb 2023 04:51  Patient On (Oxygen Delivery Method): room air          PHYSICAL EXAMINATION:    NECK:  Supple. No lymphadenopathy. Jugular venous pressure not elevated. Carotids equal.   HEART:   The cardiac impulse has a normal quality. Reg., Nl S1 and S2.  There are no murmurs, rubs or gallops noted  CHEST:  Chest is clear to auscultation. Normal respiratory effort.  ABDOMEN:  Soft and nontender.   EXTREMITIES:  There is no edema.       LABS:                        8.9    6.81  )-----------( 228      ( 03 Feb 2023 03:59 )             29.4           PT/INR - ( 01 Feb 2023 13:30 )   PT: 11.1 sec;   INR: 0.96 ratio         PTT - ( 03 Feb 2023 03:59 )  PTT:80.9 sec           Subjective:    pat seen in HD, new left DVt on iv heparin, no respiratory distress.    Home Medications:  Albuterol (Eqv-Proventil HFA) 90 mcg/inh inhalation aerosol: 2 puff(s) inhaled every 6 hours, As Needed - for shortness of breath and/or wheezing (30 Jan 2023 12:02)  atorvastatin 40 mg oral tablet: 1 tab(s) orally once a day (at bedtime) (03 Feb 2023 06:59)  bisacodyl 10 mg rectal suppository: 1 suppository(ies) rectally once a day, As Needed if no results from MOM (30 Jan 2023 12:02)  Breo Ellipta 200 mcg-25 mcg/inh inhalation powder: 1 puff(s) inhaled once a day (30 Jan 2023 12:02)  citalopram 20 mg oral tablet: 1 tab(s) orally once a day (30 Jan 2023 12:02)  Fleet Enema 19 g-7 g rectal enema: 133 milliliter(s) rectal once if no results from Dulcolax (30 Jan 2023 12:02)  hydrALAZINE 50 mg oral tablet: 1 tab(s) orally 3 times a day (30 Jan 2023 12:02)  levothyroxine 25 mcg (0.025 mg) oral tablet: 1 tab(s) orally once a day (30 Jan 2023 12:02)  Metoprolol Succinate  mg oral tablet, extended release: 1 tab(s) orally once a day (30 Jan 2023 12:02)  mirtazapine 7.5 mg oral tablet: 1 tab(s) orally once a day (at bedtime) (30 Jan 2023 12:02)  Multiple Vitamins oral tablet: 1 tab(s) orally once a day (30 Jan 2023 12:02)  pantoprazole 40 mg oral delayed release tablet: 1 tab(s) orally once a day (30 Jan 2023 12:02)  Spiriva 18 mcg inhalation capsule: 1 cap(s) inhaled once a day (30 Jan 2023 12:02)  tamsulosin 0.4 mg oral capsule: 1 cap(s) orally once a day (at bedtime) (30 Jan 2023 12:02)    MEDICATIONS  (STANDING):  apixaban 10 milliGRAM(s) Oral every 12 hours  atorvastatin 40 milliGRAM(s) Oral at bedtime  budesonide 160 MICROgram(s)/formoterol 4.5 MICROgram(s) Inhaler 2 Puff(s) Inhalation two times a day  citalopram 20 milliGRAM(s) Oral daily  dexAMETHasone     Tablet 6 milliGRAM(s) Oral daily  heparin  Infusion.  Unit(s)/Hr (11 mL/Hr) IV Continuous <Continuous>  hydrALAZINE 50 milliGRAM(s) Oral three times a day  levothyroxine 25 MICROGram(s) Oral daily  metoprolol succinate  milliGRAM(s) Oral daily  mirtazapine 7.5 milliGRAM(s) Oral at bedtime  pantoprazole    Tablet 40 milliGRAM(s) Oral before breakfast  tamsulosin 0.4 milliGRAM(s) Oral at bedtime  tiotropium 2.5 MICROgram(s) Inhaler 2 Puff(s) Inhalation daily    MEDICATIONS  (PRN):  acetaminophen     Tablet .. 650 milliGRAM(s) Oral every 6 hours PRN Temp greater or equal to 38C (100.4F), Mild Pain (1 - 3)  albuterol    90 MICROgram(s) HFA Inhaler 2 Puff(s) Inhalation every 6 hours PRN for shortness of breath and/or wheezing  heparin   Injectable 4500 Unit(s) IV Push every 6 hours PRN For aPTT less than 40  heparin   Injectable 2000 Unit(s) IV Push every 6 hours PRN For aPTT between 40 - 57  melatonin 3 milliGRAM(s) Oral at bedtime PRN Insomnia  ondansetron Injectable 4 milliGRAM(s) IV Push every 8 hours PRN Nausea and/or Vomiting      Allergies    No Known Allergies    Intolerances        Vital Signs Last 24 Hrs  T(C): 37.3 (03 Feb 2023 04:51), Max: 37.4 (02 Feb 2023 20:24)  T(F): 99.1 (03 Feb 2023 04:51), Max: 99.3 (02 Feb 2023 20:24)  HR: 74 (03 Feb 2023 04:51) (74 - 83)  BP: 103/65 (03 Feb 2023 04:51) (100/65 - 118/69)  BP(mean): --  RR: 18 (03 Feb 2023 04:51) (18 - 18)  SpO2: 95% (03 Feb 2023 04:51) (93% - 95%)    Parameters below as of 03 Feb 2023 04:51  Patient On (Oxygen Delivery Method): room air          PHYSICAL EXAMINATION:    NECK:  Supple. No lymphadenopathy. Jugular venous pressure not elevated. Carotids equal.   HEART:   The cardiac impulse has a normal quality. Reg., Nl S1 and S2.  There are no murmurs, rubs or gallops noted  CHEST:  Chest crackles to auscultation. Normal respiratory effort.  ABDOMEN:  Soft and nontender.   EXTREMITIES:  There is no edema.       LABS:                        8.9    6.81  )-----------( 228      ( 03 Feb 2023 03:59 )             29.4           PT/INR - ( 01 Feb 2023 13:30 )   PT: 11.1 sec;   INR: 0.96 ratio         PTT - ( 03 Feb 2023 03:59 )  PTT:80.9 sec      US Duplex Venous Lower Ext Complete, Bilateral (02.02.23 @ 12:24) >  IMPRESSION:    Deep venous thrombosis in the left peroneal vein.    No evidence of deep venous thrombosis in the right lower extremity.

## 2023-02-03 NOTE — DISCHARGE NOTE NURSING/CASE MANAGEMENT/SOCIAL WORK - NSDCPEFALRISK_GEN_ALL_CORE
For information on Fall & Injury Prevention, visit: [email or web address] OR  [email or web address] OR  https://www.cdc.gov/steadi/patient.html

## 2023-02-04 LAB
CULTURE RESULTS: SIGNIFICANT CHANGE UP
SPECIMEN SOURCE: SIGNIFICANT CHANGE UP

## 2023-02-06 LAB
CULTURE RESULTS: SIGNIFICANT CHANGE UP
CULTURE RESULTS: SIGNIFICANT CHANGE UP
SPECIMEN SOURCE: SIGNIFICANT CHANGE UP
SPECIMEN SOURCE: SIGNIFICANT CHANGE UP

## 2023-02-10 DIAGNOSIS — N18.6 END STAGE RENAL DISEASE: ICD-10-CM

## 2023-02-10 DIAGNOSIS — Z95.5 PRESENCE OF CORONARY ANGIOPLASTY IMPLANT AND GRAFT: ICD-10-CM

## 2023-02-10 DIAGNOSIS — Z79.51 LONG TERM (CURRENT) USE OF INHALED STEROIDS: ICD-10-CM

## 2023-02-10 DIAGNOSIS — J12.82 PNEUMONIA DUE TO CORONAVIRUS DISEASE 2019: ICD-10-CM

## 2023-02-10 DIAGNOSIS — I13.2 HYPERTENSIVE HEART AND CHRONIC KIDNEY DISEASE WITH HEART FAILURE AND WITH STAGE 5 CHRONIC KIDNEY DISEASE, OR END STAGE RENAL DISEASE: ICD-10-CM

## 2023-02-10 DIAGNOSIS — I82.452 ACUTE EMBOLISM AND THROMBOSIS OF LEFT PERONEAL VEIN: ICD-10-CM

## 2023-02-10 DIAGNOSIS — J96.20 ACUTE AND CHRONIC RESPIRATORY FAILURE, UNSPECIFIED WHETHER WITH HYPOXIA OR HYPERCAPNIA: ICD-10-CM

## 2023-02-10 DIAGNOSIS — Z79.52 LONG TERM (CURRENT) USE OF SYSTEMIC STEROIDS: ICD-10-CM

## 2023-02-10 DIAGNOSIS — Z87.891 PERSONAL HISTORY OF NICOTINE DEPENDENCE: ICD-10-CM

## 2023-02-10 DIAGNOSIS — U07.1 COVID-19: ICD-10-CM

## 2023-02-10 DIAGNOSIS — I25.10 ATHEROSCLEROTIC HEART DISEASE OF NATIVE CORONARY ARTERY WITHOUT ANGINA PECTORIS: ICD-10-CM

## 2023-02-10 DIAGNOSIS — J44.1 CHRONIC OBSTRUCTIVE PULMONARY DISEASE WITH (ACUTE) EXACERBATION: ICD-10-CM

## 2023-02-10 DIAGNOSIS — I50.31 ACUTE DIASTOLIC (CONGESTIVE) HEART FAILURE: ICD-10-CM

## 2023-02-10 DIAGNOSIS — Z66 DO NOT RESUSCITATE: ICD-10-CM

## 2023-02-10 DIAGNOSIS — Z99.2 DEPENDENCE ON RENAL DIALYSIS: ICD-10-CM

## 2023-02-10 DIAGNOSIS — Z91.51 PERSONAL HISTORY OF SUICIDAL BEHAVIOR: ICD-10-CM

## 2023-02-10 DIAGNOSIS — E44.0 MODERATE PROTEIN-CALORIE MALNUTRITION: ICD-10-CM

## 2023-10-08 ENCOUNTER — INPATIENT (INPATIENT)
Facility: HOSPITAL | Age: 78
LOS: 10 days | Discharge: HOME | DRG: 480 | End: 2023-10-19
Attending: INTERNAL MEDICINE | Admitting: STUDENT IN AN ORGANIZED HEALTH CARE EDUCATION/TRAINING PROGRAM
Payer: MEDICARE

## 2023-10-08 VITALS
SYSTOLIC BLOOD PRESSURE: 182 MMHG | OXYGEN SATURATION: 97 % | TEMPERATURE: 98 F | RESPIRATION RATE: 18 BRPM | HEART RATE: 76 BPM | DIASTOLIC BLOOD PRESSURE: 107 MMHG

## 2023-10-08 LAB
ALBUMIN SERPL ELPH-MCNC: 3.7 G/DL — SIGNIFICANT CHANGE UP (ref 3.3–5.2)
ALP SERPL-CCNC: 85 U/L — SIGNIFICANT CHANGE UP (ref 40–120)
ALT FLD-CCNC: 16 U/L — SIGNIFICANT CHANGE UP
ANION GAP SERPL CALC-SCNC: 16 MMOL/L — SIGNIFICANT CHANGE UP (ref 5–17)
APTT BLD: 29.2 SEC — SIGNIFICANT CHANGE UP (ref 24.5–35.6)
AST SERPL-CCNC: 26 U/L — SIGNIFICANT CHANGE UP
BILIRUB SERPL-MCNC: 0.3 MG/DL — LOW (ref 0.4–2)
BUN SERPL-MCNC: 82.7 MG/DL — HIGH (ref 8–20)
CALCIUM SERPL-MCNC: 8.1 MG/DL — LOW (ref 8.4–10.5)
CHLORIDE SERPL-SCNC: 97 MMOL/L — SIGNIFICANT CHANGE UP (ref 96–108)
CO2 SERPL-SCNC: 24 MMOL/L — SIGNIFICANT CHANGE UP (ref 22–29)
CREAT SERPL-MCNC: 5.67 MG/DL — HIGH (ref 0.5–1.3)
EGFR: 9 ML/MIN/1.73M2 — LOW
GLUCOSE SERPL-MCNC: 94 MG/DL — SIGNIFICANT CHANGE UP (ref 70–99)
HCT VFR BLD CALC: 38.4 % — LOW (ref 39–50)
HGB BLD-MCNC: 11.5 G/DL — LOW (ref 13–17)
INR BLD: 0.97 RATIO — SIGNIFICANT CHANGE UP (ref 0.85–1.18)
MCHC RBC-ENTMCNC: 29.9 GM/DL — LOW (ref 32–36)
MCHC RBC-ENTMCNC: 30.8 PG — SIGNIFICANT CHANGE UP (ref 27–34)
MCV RBC AUTO: 102.9 FL — HIGH (ref 80–100)
PLATELET # BLD AUTO: 207 K/UL — SIGNIFICANT CHANGE UP (ref 150–400)
POTASSIUM SERPL-MCNC: 6.1 MMOL/L — CRITICAL HIGH (ref 3.5–5.3)
POTASSIUM SERPL-SCNC: 6.1 MMOL/L — CRITICAL HIGH (ref 3.5–5.3)
PROT SERPL-MCNC: 6.2 G/DL — LOW (ref 6.6–8.7)
PROTHROM AB SERPL-ACNC: 10.8 SEC — SIGNIFICANT CHANGE UP (ref 9.5–13)
RBC # BLD: 3.73 M/UL — LOW (ref 4.2–5.8)
RBC # FLD: 18.5 % — HIGH (ref 10.3–14.5)
SODIUM SERPL-SCNC: 137 MMOL/L — SIGNIFICANT CHANGE UP (ref 135–145)
WBC # BLD: 9.06 K/UL — SIGNIFICANT CHANGE UP (ref 3.8–10.5)
WBC # FLD AUTO: 9.06 K/UL — SIGNIFICANT CHANGE UP (ref 3.8–10.5)

## 2023-10-08 PROCEDURE — 93010 ELECTROCARDIOGRAM REPORT: CPT

## 2023-10-08 PROCEDURE — 99285 EMERGENCY DEPT VISIT HI MDM: CPT

## 2023-10-08 RX ORDER — DEXTROSE 50 % IN WATER 50 %
25 SYRINGE (ML) INTRAVENOUS ONCE
Refills: 0 | Status: COMPLETED | OUTPATIENT
Start: 2023-10-08 | End: 2023-10-08

## 2023-10-08 RX ORDER — MORPHINE SULFATE 50 MG/1
4 CAPSULE, EXTENDED RELEASE ORAL ONCE
Refills: 0 | Status: DISCONTINUED | OUTPATIENT
Start: 2023-10-08 | End: 2023-10-08

## 2023-10-08 RX ORDER — SODIUM ZIRCONIUM CYCLOSILICATE 10 G/10G
10 POWDER, FOR SUSPENSION ORAL ONCE
Refills: 0 | Status: COMPLETED | OUTPATIENT
Start: 2023-10-08 | End: 2023-10-08

## 2023-10-08 RX ORDER — INSULIN HUMAN 100 [IU]/ML
10 INJECTION, SOLUTION SUBCUTANEOUS ONCE
Refills: 0 | Status: COMPLETED | OUTPATIENT
Start: 2023-10-08 | End: 2023-10-08

## 2023-10-08 RX ORDER — SODIUM CHLORIDE 9 MG/ML
1000 INJECTION INTRAMUSCULAR; INTRAVENOUS; SUBCUTANEOUS ONCE
Refills: 0 | Status: COMPLETED | OUTPATIENT
Start: 2023-10-08 | End: 2023-10-08

## 2023-10-08 RX ADMIN — MORPHINE SULFATE 4 MILLIGRAM(S): 50 CAPSULE, EXTENDED RELEASE ORAL at 23:30

## 2023-10-08 RX ADMIN — MORPHINE SULFATE 4 MILLIGRAM(S): 50 CAPSULE, EXTENDED RELEASE ORAL at 23:03

## 2023-10-08 RX ADMIN — SODIUM CHLORIDE 1000 MILLILITER(S): 9 INJECTION INTRAMUSCULAR; INTRAVENOUS; SUBCUTANEOUS at 23:05

## 2023-10-08 NOTE — ED ADULT NURSE REASSESSMENT NOTE - NS ED NURSE REASSESS COMMENT FT1
assumed care of pt at 2315 from JODI Nolen , charting as noted. pt is A&Ox4, respirations even and unlabored. pt is expressing frustration with care, pt is refusing CT head and xray of chest. pt states an xray technician came into the room and told him he needed to do an xray and the pt refused and sent the tech away. pt educated on need for imaging, pt is now stating he will accept an xray of pelvis. RN spoke with xray tech that pt would now like xray of pelvis. pt also c/o pain, Dr. Armijo made aware. pt shows no s/s of acute distress at this time. safety precautions maintained.

## 2023-10-08 NOTE — ED PROVIDER NOTE - OBJECTIVE STATEMENT
78-year-old male with a history of COPD and hypertension on home O2 comes in complaining of left hip pain status post trip and fall .denies head injury.  Patient denies being on a blood thinner .no chest pain, no shortness of breath no loc .  Patient does become easily agitated.  He states that he recently went home from rehab and does not want to return to that place. 78-year-old male with a history of COPD and hypertension on home O2 comes in complaining of left hip pain status post trip and fall .denies head injury.  Patient denies being on a blood thinner .no chest pain, no shortness of breath no loc .  Patient does become easily agitated.  He states that he recently went home from rehab and does not want to return to that place.   pt with ESRD last dialyzed friday , scheduled MWF

## 2023-10-08 NOTE — ED ADULT NURSE NOTE - NSFALLRISKINTERV_ED_ALL_ED

## 2023-10-08 NOTE — ED ADULT NURSE NOTE - OBJECTIVE STATEMENT
Pt is here with c/o L hip pain after he fell down 3 steps, p/s he could not see the last step in the dark. Pt denies blood thinners, denies hitting his head.   Pt is easily agitated, states he just got out of rehab and doesn't want to go back. Pt has ESRD and is on dialysis, fistula to L arm.  IV inserted by Dr Dueñas to [facility] with ultrasound, labs sent.  Pt medicated for pain as ordered. IVF infusing without s/s redness or swelling noted.

## 2023-10-08 NOTE — ED ADULT TRIAGE NOTE - CHIEF COMPLAINT QUOTE
BIBEMS c/o left hip/leg pain.  pt states he fell down 3 steps because he could not see in the dark.  denies head strike, blood thinner use, loc.  pulse, motor, sensory intact.  pt on 3l o2 at baseline for copd

## 2023-10-09 DIAGNOSIS — S72.142A DISPLACED INTERTROCHANTERIC FRACTURE OF LEFT FEMUR, INITIAL ENCOUNTER FOR CLOSED FRACTURE: ICD-10-CM

## 2023-10-09 PROBLEM — N18.6 END STAGE RENAL DISEASE: Chronic | Status: ACTIVE | Noted: 2023-01-30

## 2023-10-09 PROBLEM — J44.9 CHRONIC OBSTRUCTIVE PULMONARY DISEASE, UNSPECIFIED: Chronic | Status: ACTIVE | Noted: 2023-01-30

## 2023-10-09 PROBLEM — I25.10 ATHEROSCLEROTIC HEART DISEASE OF NATIVE CORONARY ARTERY WITHOUT ANGINA PECTORIS: Chronic | Status: ACTIVE | Noted: 2023-01-30

## 2023-10-09 LAB
ANION GAP SERPL CALC-SCNC: 17 MMOL/L — SIGNIFICANT CHANGE UP (ref 5–17)
ANISOCYTOSIS BLD QL: SLIGHT — SIGNIFICANT CHANGE UP
BASOPHILS # BLD AUTO: 0.08 K/UL — SIGNIFICANT CHANGE UP (ref 0–0.2)
BASOPHILS NFR BLD AUTO: 0.9 % — SIGNIFICANT CHANGE UP (ref 0–2)
BLD GP AB SCN SERPL QL: SIGNIFICANT CHANGE UP
BUN SERPL-MCNC: 82.1 MG/DL — HIGH (ref 8–20)
CALCIUM SERPL-MCNC: 8.2 MG/DL — LOW (ref 8.4–10.5)
CALCIUM SERPL-MCNC: 8.3 MG/DL — LOW (ref 8.4–10.5)
CHLORIDE SERPL-SCNC: 103 MMOL/L — SIGNIFICANT CHANGE UP (ref 96–108)
CO2 SERPL-SCNC: 19 MMOL/L — LOW (ref 22–29)
CREAT SERPL-MCNC: 5.74 MG/DL — HIGH (ref 0.5–1.3)
EGFR: 9 ML/MIN/1.73M2 — LOW
ELLIPTOCYTES BLD QL SMEAR: SLIGHT — SIGNIFICANT CHANGE UP
EOSINOPHIL # BLD AUTO: 0.07 K/UL — SIGNIFICANT CHANGE UP (ref 0–0.5)
EOSINOPHIL NFR BLD AUTO: 0.8 % — SIGNIFICANT CHANGE UP (ref 0–6)
GLUCOSE SERPL-MCNC: 83 MG/DL — SIGNIFICANT CHANGE UP (ref 70–99)
LYMPHOCYTES # BLD AUTO: 0.15 K/UL — LOW (ref 1–3.3)
LYMPHOCYTES # BLD AUTO: 1.7 % — LOW (ref 13–44)
MACROCYTES BLD QL: SLIGHT — SIGNIFICANT CHANGE UP
MANUAL SMEAR VERIFICATION: SIGNIFICANT CHANGE UP
MONOCYTES # BLD AUTO: 0.47 K/UL — SIGNIFICANT CHANGE UP (ref 0–0.9)
MONOCYTES NFR BLD AUTO: 5.2 % — SIGNIFICANT CHANGE UP (ref 2–14)
MRSA PCR RESULT.: DETECTED
NEUTROPHILS # BLD AUTO: 8.28 K/UL — HIGH (ref 1.8–7.4)
NEUTROPHILS NFR BLD AUTO: 91.4 % — HIGH (ref 43–77)
PHOSPHATE SERPL-MCNC: 6.2 MG/DL — HIGH (ref 2.4–4.7)
PLAT MORPH BLD: NORMAL — SIGNIFICANT CHANGE UP
POIKILOCYTOSIS BLD QL AUTO: SLIGHT — SIGNIFICANT CHANGE UP
POLYCHROMASIA BLD QL SMEAR: SLIGHT — SIGNIFICANT CHANGE UP
POTASSIUM SERPL-MCNC: 5.1 MMOL/L — SIGNIFICANT CHANGE UP (ref 3.5–5.3)
POTASSIUM SERPL-SCNC: 5.1 MMOL/L — SIGNIFICANT CHANGE UP (ref 3.5–5.3)
PTH-INTACT FLD-MCNC: 350 PG/ML — HIGH (ref 15–65)
RBC BLD AUTO: ABNORMAL
S AUREUS DNA NOSE QL NAA+PROBE: DETECTED
SODIUM SERPL-SCNC: 139 MMOL/L — SIGNIFICANT CHANGE UP (ref 135–145)

## 2023-10-09 PROCEDURE — 73552 X-RAY EXAM OF FEMUR 2/>: CPT | Mod: 26,LT

## 2023-10-09 PROCEDURE — 99223 1ST HOSP IP/OBS HIGH 75: CPT | Mod: 57

## 2023-10-09 PROCEDURE — 71045 X-RAY EXAM CHEST 1 VIEW: CPT | Mod: 26

## 2023-10-09 PROCEDURE — G1004: CPT

## 2023-10-09 PROCEDURE — 73502 X-RAY EXAM HIP UNI 2-3 VIEWS: CPT | Mod: 26,LT

## 2023-10-09 PROCEDURE — 72192 CT PELVIS W/O DYE: CPT | Mod: 26,MG

## 2023-10-09 PROCEDURE — 99223 1ST HOSP IP/OBS HIGH 75: CPT

## 2023-10-09 PROCEDURE — 70450 CT HEAD/BRAIN W/O DYE: CPT | Mod: 26,MG

## 2023-10-09 RX ORDER — TRANEXAMIC ACID 100 MG/ML
1000 INJECTION, SOLUTION INTRAVENOUS ONCE
Refills: 0 | Status: COMPLETED | OUTPATIENT
Start: 2023-10-09 | End: 2023-10-09

## 2023-10-09 RX ORDER — CITALOPRAM 10 MG/1
20 TABLET, FILM COATED ORAL DAILY
Refills: 0 | Status: DISCONTINUED | OUTPATIENT
Start: 2023-10-09 | End: 2023-10-10

## 2023-10-09 RX ORDER — MIRTAZAPINE 45 MG/1
7.5 TABLET, ORALLY DISINTEGRATING ORAL AT BEDTIME
Refills: 0 | Status: DISCONTINUED | OUTPATIENT
Start: 2023-10-09 | End: 2023-10-10

## 2023-10-09 RX ORDER — HYDROMORPHONE HYDROCHLORIDE 2 MG/ML
1 INJECTION INTRAMUSCULAR; INTRAVENOUS; SUBCUTANEOUS ONCE
Refills: 0 | Status: DISCONTINUED | OUTPATIENT
Start: 2023-10-09 | End: 2023-10-09

## 2023-10-09 RX ORDER — ATORVASTATIN CALCIUM 80 MG/1
40 TABLET, FILM COATED ORAL AT BEDTIME
Refills: 0 | Status: DISCONTINUED | OUTPATIENT
Start: 2023-10-09 | End: 2023-10-10

## 2023-10-09 RX ORDER — POVIDONE-IODINE 5 %
1 AEROSOL (ML) TOPICAL ONCE
Refills: 0 | Status: COMPLETED | OUTPATIENT
Start: 2023-10-09 | End: 2023-10-10

## 2023-10-09 RX ORDER — LEVOTHYROXINE SODIUM 125 MCG
25 TABLET ORAL DAILY
Refills: 0 | Status: DISCONTINUED | OUTPATIENT
Start: 2023-10-09 | End: 2023-10-10

## 2023-10-09 RX ORDER — MORPHINE SULFATE 50 MG/1
4 CAPSULE, EXTENDED RELEASE ORAL ONCE
Refills: 0 | Status: DISCONTINUED | OUTPATIENT
Start: 2023-10-09 | End: 2023-10-09

## 2023-10-09 RX ORDER — MUPIROCIN 20 MG/G
1 OINTMENT TOPICAL
Refills: 0 | Status: DISCONTINUED | OUTPATIENT
Start: 2023-10-09 | End: 2023-10-10

## 2023-10-09 RX ORDER — ACETAMINOPHEN 500 MG
1000 TABLET ORAL ONCE
Refills: 0 | Status: COMPLETED | OUTPATIENT
Start: 2023-10-09 | End: 2023-10-09

## 2023-10-09 RX ORDER — HYDRALAZINE HCL 50 MG
10 TABLET ORAL EVERY 8 HOURS
Refills: 0 | Status: DISCONTINUED | OUTPATIENT
Start: 2023-10-09 | End: 2023-10-10

## 2023-10-09 RX ORDER — PANTOPRAZOLE SODIUM 20 MG/1
40 TABLET, DELAYED RELEASE ORAL
Refills: 0 | Status: DISCONTINUED | OUTPATIENT
Start: 2023-10-09 | End: 2023-10-10

## 2023-10-09 RX ORDER — HYDRALAZINE HCL 50 MG
50 TABLET ORAL THREE TIMES A DAY
Refills: 0 | Status: DISCONTINUED | OUTPATIENT
Start: 2023-10-09 | End: 2023-10-10

## 2023-10-09 RX ORDER — CHLORHEXIDINE GLUCONATE 213 G/1000ML
1 SOLUTION TOPICAL EVERY 12 HOURS
Refills: 0 | Status: COMPLETED | OUTPATIENT
Start: 2023-10-09 | End: 2023-10-10

## 2023-10-09 RX ORDER — ONDANSETRON 8 MG/1
4 TABLET, FILM COATED ORAL EVERY 8 HOURS
Refills: 0 | Status: DISCONTINUED | OUTPATIENT
Start: 2023-10-09 | End: 2023-10-10

## 2023-10-09 RX ORDER — TAMSULOSIN HYDROCHLORIDE 0.4 MG/1
1 CAPSULE ORAL
Qty: 0 | Refills: 0 | DISCHARGE

## 2023-10-09 RX ORDER — METOPROLOL TARTRATE 50 MG
100 TABLET ORAL DAILY
Refills: 0 | Status: DISCONTINUED | OUTPATIENT
Start: 2023-10-09 | End: 2023-10-10

## 2023-10-09 RX ORDER — LANOLIN ALCOHOL/MO/W.PET/CERES
3 CREAM (GRAM) TOPICAL AT BEDTIME
Refills: 0 | Status: DISCONTINUED | OUTPATIENT
Start: 2023-10-09 | End: 2023-10-10

## 2023-10-09 RX ORDER — ACETAMINOPHEN 500 MG
975 TABLET ORAL EVERY 8 HOURS
Refills: 0 | Status: DISCONTINUED | OUTPATIENT
Start: 2023-10-09 | End: 2023-10-10

## 2023-10-09 RX ORDER — HYDRALAZINE HCL 50 MG
10 TABLET ORAL ONCE
Refills: 0 | Status: COMPLETED | OUTPATIENT
Start: 2023-10-09 | End: 2023-10-09

## 2023-10-09 RX ADMIN — MORPHINE SULFATE 4 MILLIGRAM(S): 50 CAPSULE, EXTENDED RELEASE ORAL at 01:00

## 2023-10-09 RX ADMIN — SODIUM ZIRCONIUM CYCLOSILICATE 10 GRAM(S): 10 POWDER, FOR SUSPENSION ORAL at 00:33

## 2023-10-09 RX ADMIN — HYDROMORPHONE HYDROCHLORIDE 1 MILLIGRAM(S): 2 INJECTION INTRAMUSCULAR; INTRAVENOUS; SUBCUTANEOUS at 01:35

## 2023-10-09 RX ADMIN — Medication 10 MILLIGRAM(S): at 08:21

## 2023-10-09 RX ADMIN — Medication 975 MILLIGRAM(S): at 18:36

## 2023-10-09 RX ADMIN — Medication 25 GRAM(S): at 00:32

## 2023-10-09 RX ADMIN — MORPHINE SULFATE 4 MILLIGRAM(S): 50 CAPSULE, EXTENDED RELEASE ORAL at 00:28

## 2023-10-09 RX ADMIN — TRANEXAMIC ACID 220 MILLIGRAM(S): 100 INJECTION, SOLUTION INTRAVENOUS at 11:44

## 2023-10-09 RX ADMIN — MORPHINE SULFATE 4 MILLIGRAM(S): 50 CAPSULE, EXTENDED RELEASE ORAL at 08:15

## 2023-10-09 RX ADMIN — MUPIROCIN 1 APPLICATION(S): 20 OINTMENT TOPICAL at 23:21

## 2023-10-09 RX ADMIN — Medication 975 MILLIGRAM(S): at 23:22

## 2023-10-09 RX ADMIN — Medication 400 MILLIGRAM(S): at 11:38

## 2023-10-09 RX ADMIN — INSULIN HUMAN 10 UNIT(S): 100 INJECTION, SOLUTION SUBCUTANEOUS at 00:43

## 2023-10-09 RX ADMIN — Medication 975 MILLIGRAM(S): at 17:51

## 2023-10-09 RX ADMIN — MIRTAZAPINE 7.5 MILLIGRAM(S): 45 TABLET, ORALLY DISINTEGRATING ORAL at 23:22

## 2023-10-09 RX ADMIN — HYDROMORPHONE HYDROCHLORIDE 1 MILLIGRAM(S): 2 INJECTION INTRAMUSCULAR; INTRAVENOUS; SUBCUTANEOUS at 01:20

## 2023-10-09 RX ADMIN — Medication 50 MILLIGRAM(S): at 23:22

## 2023-10-09 RX ADMIN — HYDROMORPHONE HYDROCHLORIDE 1 MILLIGRAM(S): 2 INJECTION INTRAMUSCULAR; INTRAVENOUS; SUBCUTANEOUS at 04:32

## 2023-10-09 RX ADMIN — ATORVASTATIN CALCIUM 40 MILLIGRAM(S): 80 TABLET, FILM COATED ORAL at 23:23

## 2023-10-09 RX ADMIN — Medication 50 MILLIGRAM(S): at 17:51

## 2023-10-09 NOTE — CONSULT NOTE ADULT - SUBJECTIVE AND OBJECTIVE BOX
78 year old male with PMH of depression, HLD, HTN, CAD s/p stents, COPD, DVT, BPH, ESRD on HD and hx of suicidal ideation where patient reports that he attempted to cut his AV fistula with a knife, s/p AV fistula ligation, now with L chest tunneled dialysis catheter, presents with a fall. Found to have left femoral intertrochanteric fracture. Nephrology is consulted for resumption of hemodialysis in an ESRD patient. Of note, patient complained of a recent UTI, on an unknown oral antibiotics, and inability to empty his bladder. No suprapubic tenderness on palpation.       PAST MEDICAL & SURGICAL HISTORY:  COPD (chronic obstructive pulmonary disease)      CAD (coronary artery disease)      ESRD on dialysis    Allergies    No Known Allergies    Intolerances    Home Medications:  Albuterol (Eqv-Proventil HFA) 90 mcg/inh inhalation aerosol: 2 puff(s) inhaled every 6 hours, As Needed - for shortness of breath and/or wheezing (30 Jan 2023 12:02)  apixaban 5 mg oral tablet: 2 tab(s) orally every 12 hours x 7 days 2/3/2023 to 2/9/2023 from 2/10/2023 take 5mg  every 12 hours     (03 Feb 2023 11:43)  atorvastatin 40 mg oral tablet: 1 tab(s) orally once a day (at bedtime) (03 Feb 2023 06:59)  bisacodyl 10 mg rectal suppository: 1 suppository(ies) rectally once a day, As Needed if no results from MOM (30 Jan 2023 12:02)  Breo Ellipta 200 mcg-25 mcg/inh inhalation powder: 1 puff(s) inhaled once a day (30 Jan 2023 12:02)  citalopram 20 mg oral tablet: 1 tab(s) orally once a day (30 Jan 2023 12:02)  Fleet Enema 19 g-7 g rectal enema: 133 milliliter(s) rectal once if no results from Dulcolax (30 Jan 2023 12:02)  hydrALAZINE 50 mg oral tablet: 1 tab(s) orally 3 times a day (30 Jan 2023 12:02)  levothyroxine 25 mcg (0.025 mg) oral tablet: 1 tab(s) orally once a day (30 Jan 2023 12:02)  Metoprolol Succinate  mg oral tablet, extended release: 1 tab(s) orally once a day (30 Jan 2023 12:02)  mirtazapine 7.5 mg oral tablet: 1 tab(s) orally once a day (at bedtime) (30 Jan 2023 12:02)  Multiple Vitamins oral tablet: 1 tab(s) orally once a day (30 Jan 2023 12:02)  pantoprazole 40 mg oral delayed release tablet: 1 tab(s) orally once a day (30 Jan 2023 12:02)  Spiriva 18 mcg inhalation capsule: 1 cap(s) inhaled once a day (30 Jan 2023 12:02)  tamsulosin 0.4 mg oral capsule: 1 cap(s) orally once a day (at bedtime) (30 Jan 2023 12:02)    FAMILY HISTORY:    Social History:    ROS: "I can't pee"    Vital Signs Last 24 Hrs  T(C): 36.1 (09 Oct 2023 07:18), Max: 36.6 (09 Oct 2023 05:58)  T(F): 96.9 (09 Oct 2023 07:18), Max: 97.8 (09 Oct 2023 05:58)  HR: 69 (09 Oct 2023 07:18) (69 - 77)  BP: 174/86 (09 Oct 2023 08:10) (162/77 - 199/101)  BP(mean): 91 (09 Oct 2023 05:58) (91 - 91)  RR: 16 (09 Oct 2023 07:18) (16 - 18)  SpO2: 99% (09 Oct 2023 07:18) (97% - 100%)    Parameters below as of 09 Oct 2023 07:18  Patient On (Oxygen Delivery Method): nasal cannula  O2 Flow (L/min): 3    I&O's Summary    Physical Exam  General: WDWN male in mild distress, disheveled appearance  HEENT: Antiicteral sclera   Cardiac: S1S2 RRR  Respiratory: CTAB  Abdomen: Soft, NT  Extremities: No appreciable edema  Neuro: AAOx3  Access: L chest tunneled dialysis catheter     10-09    139  |  103  |  82.1<H>  ----------------------------<  83  5.1   |  19.0<L>  |  5.74<H>    Ca    8.2<L>      09 Oct 2023 05:53    TPro  6.2<L>  /  Alb  3.7  /  TBili  0.3<L>  /  DBili  x   /  AST  26  /  ALT  16  /  AlkPhos  85  10-08                        11.5   9.06  )-----------( 207      ( 08 Oct 2023 23:05 )             38.4     MEDICATIONS  (STANDING):  acetaminophen     Tablet .. 975 milliGRAM(s) Oral every 8 hours  acetaminophen   IVPB .. 1000 milliGRAM(s) IV Intermittent once  chlorhexidine 2% Cloths 1 Application(s) Topical every 12 hours  mupirocin 2% Ointment 1 Application(s) Both Nostrils two times a day  povidone iodine 5% Nasal Swab 1 Application(s) Both Nostrils once  tranexamic acid IVPB 1000 milliGRAM(s) IV Intermittent once    MEDICATIONS  (PRN):        
Pt Name: JULIO WYNNE    MRN: [id number]      Patient is a 78y Male presenting to the emergency department with a chief complaint of left hip pain s/p mechanical fall.  patient with phmx of ESRD, CAD with stents, DVT in Feb 2023, COPD.  patient denies pain to neck, back, right leg.       REVIEW OF SYSTEMS    General: Alert, responsive, in NAD    Skin/Breast: No rashes, no pruritis   	  Ophthalmologic: No visual changes. No redness.   	  ENMT:	No discharge. No swelling.    Respiratory and Thorax: No difficulty breathing. No cough.  	   Cardiovascular:	No chest pain. No palpitations.    Gastrointestinal:	 No abdominal pain. No diarrhea.     Genitourinary: No dysuria. No bleeding.    Musculoskeletal: SEE HPI.    Neurological: No sensory or motor changes.     Psychiatric: No anxiety or depression.    Hematology/Lymphatics: No swelling.    Endocrine: No Hx of diabetes.    ROS is otherwise negative.    PAST MEDICAL & SURGICAL HISTORY:  PAST MEDICAL & SURGICAL HISTORY:  COPD (chronic obstructive pulmonary disease)      CAD (coronary artery disease)      ESRD on dialysis          Allergies: No Known Allergies      Medications: acetaminophen     Tablet .. 975 milliGRAM(s) Oral every 8 hours  acetaminophen   IVPB .. 1000 milliGRAM(s) IV Intermittent once  chlorhexidine 2% Cloths 1 Application(s) Topical every 12 hours  mupirocin 2% Ointment 1 Application(s) Both Nostrils two times a day  povidone iodine 5% Nasal Swab 1 Application(s) Both Nostrils once  tranexamic acid IVPB 1000 milliGRAM(s) IV Intermittent once      FAMILY HISTORY:  : non-contributory      Ambulation: Walking independently                           11.5   9.06  )-----------( 207      ( 08 Oct 2023 23:05 )             38.4       10-09    139  |  103  |  82.1<H>  ----------------------------<  83  5.1   |  19.0<L>  |  5.74<H>    Ca    8.2<L>      09 Oct 2023 05:53    TPro  6.2<L>  /  Alb  3.7  /  TBili  0.3<L>  /  DBili  x   /  AST  26  /  ALT  16  /  AlkPhos  85  10-08      Vital Signs Last 24 Hrs  T(C): 36.1 (09 Oct 2023 07:18), Max: 36.6 (09 Oct 2023 05:58)  T(F): 96.9 (09 Oct 2023 07:18), Max: 97.8 (09 Oct 2023 05:58)  HR: 69 (09 Oct 2023 07:18) (69 - 77)  BP: 174/86 (09 Oct 2023 08:10) (162/77 - 199/101)  BP(mean): 91 (09 Oct 2023 05:58) (91 - 91)  RR: 16 (09 Oct 2023 07:18) (16 - 18)  SpO2: 99% (09 Oct 2023 07:18) (97% - 100%)    Parameters below as of 09 Oct 2023 07:18  Patient On (Oxygen Delivery Method): nasal cannula  O2 Flow (L/min): 3      Daily     Daily       PHYSICAL EXAM:      Appearance: Alert, responsive, in no acute distress.    Neurological: Sensation is grossly intact to light touch. 5/5 motor function of all extremities. No focal deficits or weaknesses found.    Skin: no rash on visible skin. Skin is clean, dry and intact. No bleeding. No abrasions. No ulcerations.    Vascular: 2+ distal pulses. Cap refill < 2 sec. No signs of venous insufficiency or stasis. No extremity ulcerations. No cyanosis.    Musculoskeletal:         Left Upper Extremity: positive tenderness to left hip, ROM decreased 2nd to pain.  DF/PF intact.  positive external rotation to leg, no tenderness to knee or ankle        Right Upper Extremity: no tenderness to right hip, knee, ankle, FROM no swelling, pulse intact        Left Lower Extremity: no tenderness to shoulder, elbow, wrist, FROM to all joints, no swelling       Right Lower Extremity: no tenderness to shoulder, elbow, wrist, FROM to all joints, no swelling    Imaging Studies:  2 view left hip - poor images, CT done reveals Left IT     A/P:  Pt is a 78y Male found to have Left IT Fx    PLAN:   * NPO for OR tomorrow  * anesthesia notified  *TXA, tylenol, Mup orders placed  * Bed rest    CASE DISCUSSED WITH DR. HEALY

## 2023-10-09 NOTE — PATIENT PROFILE ADULT - FALL HARM RISK - HARM RISK INTERVENTIONS
Assistance with ambulation/Assistance OOB with selected safe patient handling equipment/Communicate Risk of Fall with Harm to all staff/Discuss with provider need for PT consult/Monitor gait and stability/Reinforce activity limits and safety measures with patient and family/Tailored Fall Risk Interventions/Visual Cue: Yellow wristband and red socks/Bed in lowest position, wheels locked, appropriate side rails in place/Call bell, personal items and telephone in reach/Instruct patient to call for assistance before getting out of bed or chair/Non-slip footwear when patient is out of bed/[place] to call system/Physically safe environment - no spills, clutter or unnecessary equipment/Purposeful Proactive Rounding/Room/bathroom lighting operational, light cord in reach

## 2023-10-09 NOTE — H&P ADULT - ENDOCRINE
[Wheezing] : wheezing [Moderate] : moderate [___ Days ago] : [unfilled] days ago [Paroxysmal] : paroxysmal [Congestion] : congestion [Cough] : cough [FreeTextEntry8] : 65 years old female with hypertensions, status post C of the breast comes to the office with complaints of cough and difficulty breathing for the last few days has not occasional wheezing also.  No chills and fever negative

## 2023-10-09 NOTE — ED ADULT NURSE REASSESSMENT NOTE - NS ED NURSE REASSESS COMMENT FT1
Pt resting on stretcher,  A&O, on 3 L NC tolerating well. Repeat BMP sent. Will continue to monitor.

## 2023-10-09 NOTE — H&P ADULT - HISTORY OF PRESENT ILLNESS
Mr. Schaeffer is an 77 yo M with a PMH significant for COPD on 2L Home O2, HTN, Hypothyroidism, HTN, CAD s/p PCI, ESRD on HD MWF, L. peroneal DVT (2/2023) no longer on Eliquis who presents to [facility] ED s/p mechanical fall. Patient states he was recently released from rehab and was visiting a friend, upon leaving his house he tripped and fell over some bushes landing on his L. side. Since then he's had difficulty ambulating and pain in his L. leg. He denies any other symptoms at this time.   In ED patients vitals notable for elevated /101, saturating well on 2L NC, remaining vitals wnl. Lab work shows Hgb 11.5, K of 5.1, BUN/Cr 82/5.74 respectively, remaining labs grossly unremarkable. CT Pelvis shows L. nondisplaced femur intertrochanteric fracture. Orthopedics evaluated in ED plan for operative intervention in AM. Seen by nephrology, plan for HD today to continue MWF schedule. Admitted to medicine for further management.

## 2023-10-09 NOTE — H&P ADULT - ASSESSMENT
Mr. Schaeffer is an 79 yo M with a PMH significant for COPD on 2L Home O2, HTN, Hypothyroidism, HTN, CAD s/p PCI, ESRD on HD MWF, L. peroneal DVT (2/2023) no longer on Eliquis who presents to [facility] ED s/p mechanical fall. Found to have Nondisplaced L. intertrochanteric femur fx, with plans for Orthopedic intervention in AM, and plan for HD today to continue MWF schedule.     #L. Femoral nondisplaced intertrochanteric fracture   s/p Mechanical fall   - Evaluated by orthopedic team, plan for OR in AM, NPO after midnight   - Will require PT eval post op, likely rehab, however patient currently resistant to this idea   - Ofirmev / tylenol for pain PRN   - RCRI class III given h/o CAD / ESRD, 10.1% 30 day risk of cardiac event, Patient is medically cleared for Surgical intervention, monitor on telemetry post op,     #ESRD on HD   Nephrology following, continues on MWF HD, no missed sessions recently   - Will take for HD today   - Continue to monitor renal function, renally dose medications as needed     #COPD on Home Oxygen   not in exacerbation, currently saturating well on 2L NC (baseline)   - Continue Nebs PRN / Pulmicort     #CAD  s/p PCI in past, no longer on DAPT   - continues on ASA, Lipitor, Toprol     #HTN Urgency   elevated BP up to 199/101, started hydralazine PRN for SBP > 170, continued home meds, Toprol, Hydralazine 50 TID, Will likely improve s/p HD.     #Hypothyroidism   Continue home levothyroxine 25 mcg     #Anxiety/Depression   Continue home Mirtazapine, citalopram     #H/O L. peroneal DVT   Noted in documentation 2/2023, was on Eliquis, stopped by outpatient cardiologist per patient when clot no longer seen on imaging     DVT Prophylaxis: Heparin   Diet: Renal/DASH  Code status: DNR/DNI/ trial NIV   Dispo: Pending Ortho recs, PT eval post op

## 2023-10-09 NOTE — PATIENT PROFILE ADULT - FUNCTIONAL ASSESSMENT - BASIC MOBILITY 2.
REASON FOR CONSULTATION/CC: shortness of breath       CONSULTING PHYSICIAN: Rosa Isela Noriega MD   PCP: Rosa Isela Noriega MD    HISTORY OF PRESENT ILLNESS: Rosemary Miranda is a 52y.o. year old female with a history of smoking. who presents increase dyspnea for the last year. Asthma  She is complains of significant increase in sinus congestion and drainage. Started on Z pack   This has improved sinus but having a lot more wheezing . Using albuterol 3-4 times per day. She used to be on nebulized budesonide but is not using now. She is not sure why she stopped. ASTHMA CONTROL TEST 5/28/2021 2/24/2021   In the past 4 weeks, how much of the time did your asthma keep you from getting as much done at work, school or at home? 2 4   During the past 4 weeks, how often have you had shortness of breath? 1 1   During the past 4 weeks, how often did your asthma symptoms (wheezing, coughing, shortness of breath, chest tightness or pain) wake you up at night or earlier than usual in the morning? 1 5   During the past 4 weeks, how often have you used your rescue inhaler or nebulizer medication (such as albuterol)? 1 4   How would you rate your asthma control during the past 4 weeks? 3 5   Asthma Control Test Total Score 8 19            Objective:   PHYSICAL EXAM:  Blood pressure 122/80, pulse 84, temperature 97.3 °F (36.3 °C), temperature source Infrared, resp. rate 12, height 5' 8\" (1.727 m), weight 290 lb 9.6 oz (131.8 kg), last menstrual period 02/25/2016, SpO2 94 %, not currently breastfeeding.'  Body mass index is 44.19 kg/m². Gen: No distress. Eyes:    ENT:    Neck:    Resp: No accessory muscle use. No crackles. No wheezes. No rhonchi. CV: Regular rate. Regular rhythm. No murmur or rub. No edema. GI:    Skin:    Lymph:    M/S: No cyanosis. No clubbing. Neuro: Moves all four extremities. Psych: Oriented x 3. No anxiety. Awake. Alert. Intact judgement and insight.
3 = A little assistance

## 2023-10-09 NOTE — H&P ADULT - NSHPPHYSICALEXAM_GEN_ALL_CORE
T(C): 36.4 (10-09-23 @ 12:18), Max: 36.6 (10-09-23 @ 05:58)  HR: 64 (10-09-23 @ 12:18) (64 - 77)  BP: 137/72 (10-09-23 @ 12:18) (137/72 - 199/101)  RR: 18 (10-09-23 @ 12:18) (16 - 18)  SpO2: 96% (10-09-23 @ 12:18) (96% - 100%)    CONSTITUTIONAL: disheveled elderly male, in mild distress due to pain   EYES: PERRLA and symmetric, EOMI  ENMT: Oral mucosa with moist membranes.  RESP: No respiratory distress, no use of accessory muscles; CTA b/l, on NC 2L   CV: RRR, +S1S2, no MRG; no peripheral edema   GI: Soft, NT, ND, no rebound, no guarding  MSK: LLE limited ROM due to pain, tenderness to palpation LLE above knee / below hip, RLE wnl   SKIN: No rashes or ulcers noted; no subcutaneous nodules or induration palpable  NEURO: CN II-XII intact; normal reflexes in upper and lower extremities, sensation intact in upper and lower extremities b/l to light touch   PSYCH: Appropriate insight/judgment; A+O x 3, mood and affect appropriate, recent/remote memory intact

## 2023-10-09 NOTE — CONSULT NOTE ADULT - NS ATTEND AMEND GEN_ALL_CORE FT
Orthopaedic Trauma Surgeon Addendum:    I have reviewed the physician assistant note and agree with the history, exam, and plan of care, except as noted.    Orthopedic Surgery is ready to proceed with surgery pending medical optimization and adequate Operating Room availability. Risks of surgical delay discussed with other providers and staff. Please call with any questions or concerns.     Julito Regan MD  Orthopaedic Trauma Surgeon  [facility]

## 2023-10-09 NOTE — CONSULT NOTE ADULT - ASSESSMENT
78 year old male with PMH of depression, HLD, HTN, CAD s/p stents, COPD, DVT, BPH, ESRD on HD and hx of suicidal ideation where patient reports that he attempted to cut his AV fistula with a knife, s/p AV fistula ligation, now with L chest tunneled dialysis catheter, presents with a fall. Found to have left femoral intertrochanteric fracture. Nephrology is consulted for resumption of hemodialysis in an ESRD patient. Of note, patient complained of a recent UTI, on an unknown oral antibiotics, and inability to empty his bladder. No suprapubic tenderness on palpation.     -Access: L chest tunneled dialysis catheter  -Outpatient dialysis unit is at [place]; outpatient nephrologist - patient is unable to recall  -Outpatient dialysis days are Mondays Wednesdays Fridays  -Will dialyze today; then next hemodialysis will be on Wednesday 10/11/23  -BP suboptimal - likely component of pain superimposed on essential hypertension - to resume home BP medications; would avoid use of morphine PO/IV  -Volume Status - UF as tolerated during hemodialysis   -Anemia - hemoglobin is at goal; no indication for NORAH at this time  -Mineral Bone Disease - will check phos and PTH  -Consent for dialysis was signed by patient, witnessed by staff, scanned into EMR and placed into paper chart  -Left femoral intertrochanteric fracture - OR tomorrow as per parrish Mccall DO  Nephrology    78 year old male with PMH of depression, HLD, HTN, CAD s/p stents, COPD, DVT, BPH, ESRD on HD and hx of suicidal ideation where patient reports that he attempted to cut his AV fistula with a knife, s/p AV fistula ligation, now with L chest tunneled dialysis catheter, presents with a fall. Found to have left femoral intertrochanteric fracture. Nephrology is consulted for resumption of hemodialysis in an ESRD patient. Of note, patient complained of a recent UTI, on an unknown oral antibiotics, and inability to empty his bladder. No suprapubic tenderness on palpation.     -Access: L chest tunneled dialysis catheter  -Outpatient dialysis unit is at [place]; outpatient nephrologist - patient is unable to recall  -Outpatient dialysis days are Mondays Wednesdays Fridays  -Will dialyze today; then next hemodialysis will be on Wednesday 10/11/23  -BP suboptimal - likely component of pain superimposed on essential hypertension - to resume home BP medications; would avoid use of morphine PO/IV  -Volume Status - UF as tolerated during hemodialysis   -Anemia - hemoglobin is at goal; no indication for NORAH at this time  -Mineral Bone Disease - will check phos and PTH  -Consent for dialysis was signed by patient, witnessed by staff, scanned into EMR and placed into paper chart  -Difficulty emptying bladder - will order bladder scan   -Left femoral intertrochanteric fracture - OR tomorrow as per parrish Mccall DO  Nephrology

## 2023-10-09 NOTE — ED ADULT NURSE REASSESSMENT NOTE - NS ED NURSE REASSESS COMMENT FT1
Pt A&Ox4 resting in bed. Pt complaining of severe left hip pain and will medicate as per MD orders. Pt is hypertensive. Denies any current dizziness, N/V/D.

## 2023-10-09 NOTE — ED ADULT NURSE REASSESSMENT NOTE - NS ED NURSE REASSESS COMMENT FT1
pt medicated for pain. report given to JODI Levy & pt moved to B7R. pt is awake, alert, respirations even and unlabored. pt shows no s/s of acute distress at this time. safety precautions maintained.

## 2023-10-09 NOTE — H&P ADULT - NSICDXPASTMEDICALHX_GEN_ALL_CORE_FT
PAST MEDICAL HISTORY:  CAD (coronary artery disease)     COPD (chronic obstructive pulmonary disease)     DVT, lower extremity     ESRD on dialysis     HLD (hyperlipidemia)     HTN (hypertension)     Hypothyroidism

## 2023-10-10 LAB
ANION GAP SERPL CALC-SCNC: 16 MMOL/L — SIGNIFICANT CHANGE UP (ref 5–17)
APTT BLD: 29.7 SEC — SIGNIFICANT CHANGE UP (ref 24.5–35.6)
BASOPHILS # BLD AUTO: 0.06 K/UL — SIGNIFICANT CHANGE UP (ref 0–0.2)
BASOPHILS NFR BLD AUTO: 0.8 % — SIGNIFICANT CHANGE UP (ref 0–2)
BUN SERPL-MCNC: 48.6 MG/DL — HIGH (ref 8–20)
CALCIUM SERPL-MCNC: 8.1 MG/DL — LOW (ref 8.4–10.5)
CHLORIDE SERPL-SCNC: 96 MMOL/L — SIGNIFICANT CHANGE UP (ref 96–108)
CO2 SERPL-SCNC: 21 MMOL/L — LOW (ref 22–29)
CREAT SERPL-MCNC: 3.91 MG/DL — HIGH (ref 0.5–1.3)
EGFR: 15 ML/MIN/1.73M2 — LOW
EOSINOPHIL # BLD AUTO: 0.37 K/UL — SIGNIFICANT CHANGE UP (ref 0–0.5)
EOSINOPHIL NFR BLD AUTO: 5.1 % — SIGNIFICANT CHANGE UP (ref 0–6)
GLUCOSE SERPL-MCNC: 70 MG/DL — SIGNIFICANT CHANGE UP (ref 70–99)
HCT VFR BLD CALC: 39.4 % — SIGNIFICANT CHANGE UP (ref 39–50)
HGB BLD-MCNC: 11.8 G/DL — LOW (ref 13–17)
IMM GRANULOCYTES NFR BLD AUTO: 0.4 % — SIGNIFICANT CHANGE UP (ref 0–0.9)
INR BLD: 1.06 RATIO — SIGNIFICANT CHANGE UP (ref 0.85–1.18)
LYMPHOCYTES # BLD AUTO: 0.49 K/UL — LOW (ref 1–3.3)
LYMPHOCYTES # BLD AUTO: 6.8 % — LOW (ref 13–44)
MAGNESIUM SERPL-MCNC: 2.1 MG/DL — SIGNIFICANT CHANGE UP (ref 1.6–2.6)
MCHC RBC-ENTMCNC: 29.9 GM/DL — LOW (ref 32–36)
MCHC RBC-ENTMCNC: 31.1 PG — SIGNIFICANT CHANGE UP (ref 27–34)
MCV RBC AUTO: 103.7 FL — HIGH (ref 80–100)
MONOCYTES # BLD AUTO: 0.5 K/UL — SIGNIFICANT CHANGE UP (ref 0–0.9)
MONOCYTES NFR BLD AUTO: 6.9 % — SIGNIFICANT CHANGE UP (ref 2–14)
NEUTROPHILS # BLD AUTO: 5.8 K/UL — SIGNIFICANT CHANGE UP (ref 1.8–7.4)
NEUTROPHILS NFR BLD AUTO: 80 % — HIGH (ref 43–77)
PHOSPHATE SERPL-MCNC: 6.4 MG/DL — HIGH (ref 2.4–4.7)
PLATELET # BLD AUTO: 156 K/UL — SIGNIFICANT CHANGE UP (ref 150–400)
POTASSIUM SERPL-MCNC: 4.7 MMOL/L — SIGNIFICANT CHANGE UP (ref 3.5–5.3)
POTASSIUM SERPL-SCNC: 4.7 MMOL/L — SIGNIFICANT CHANGE UP (ref 3.5–5.3)
PROTHROM AB SERPL-ACNC: 11.7 SEC — SIGNIFICANT CHANGE UP (ref 9.5–13)
RBC # BLD: 3.8 M/UL — LOW (ref 4.2–5.8)
RBC # FLD: 17.8 % — HIGH (ref 10.3–14.5)
SODIUM SERPL-SCNC: 133 MMOL/L — LOW (ref 135–145)
WBC # BLD: 7.25 K/UL — SIGNIFICANT CHANGE UP (ref 3.8–10.5)
WBC # FLD AUTO: 7.25 K/UL — SIGNIFICANT CHANGE UP (ref 3.8–10.5)

## 2023-10-10 PROCEDURE — 99233 SBSQ HOSP IP/OBS HIGH 50: CPT

## 2023-10-10 PROCEDURE — 27245 TREAT THIGH FRACTURE: CPT | Mod: LT

## 2023-10-10 PROCEDURE — 99232 SBSQ HOSP IP/OBS MODERATE 35: CPT

## 2023-10-10 DEVICE — IMPLANTABLE DEVICE: Type: IMPLANTABLE DEVICE | Site: LEFT | Status: FUNCTIONAL

## 2023-10-10 DEVICE — GUIDE PIN 3.2X343MM: Type: IMPLANTABLE DEVICE | Site: LEFT | Status: FUNCTIONAL

## 2023-10-10 DEVICE — SCREW TRIGEN LO PROF 5.0X35MM: Type: IMPLANTABLE DEVICE | Site: LEFT | Status: FUNCTIONAL

## 2023-10-10 RX ORDER — VANCOMYCIN HCL 1 G
500 VIAL (EA) INTRAVENOUS ONCE
Refills: 0 | Status: DISCONTINUED | OUTPATIENT
Start: 2023-10-10 | End: 2023-10-10

## 2023-10-10 RX ORDER — VANCOMYCIN HCL 1 G
1000 VIAL (EA) INTRAVENOUS ONCE
Refills: 0 | Status: DISCONTINUED | OUTPATIENT
Start: 2023-10-10 | End: 2023-10-10

## 2023-10-10 RX ORDER — MIRTAZAPINE 45 MG/1
7.5 TABLET, ORALLY DISINTEGRATING ORAL AT BEDTIME
Refills: 0 | Status: DISCONTINUED | OUTPATIENT
Start: 2023-10-10 | End: 2023-10-19

## 2023-10-10 RX ORDER — ENOXAPARIN SODIUM 100 MG/ML
40 INJECTION SUBCUTANEOUS EVERY 24 HOURS
Refills: 0 | Status: DISCONTINUED | OUTPATIENT
Start: 2023-10-11 | End: 2023-10-14

## 2023-10-10 RX ORDER — OXYCODONE HYDROCHLORIDE 5 MG/1
5 TABLET ORAL EVERY 4 HOURS
Refills: 0 | Status: DISCONTINUED | OUTPATIENT
Start: 2023-10-10 | End: 2023-10-11

## 2023-10-10 RX ORDER — ONDANSETRON 8 MG/1
4 TABLET, FILM COATED ORAL ONCE
Refills: 0 | Status: DISCONTINUED | OUTPATIENT
Start: 2023-10-10 | End: 2023-10-10

## 2023-10-10 RX ORDER — POLYETHYLENE GLYCOL 3350 17 G/17G
17 POWDER, FOR SOLUTION ORAL DAILY
Refills: 0 | Status: DISCONTINUED | OUTPATIENT
Start: 2023-10-10 | End: 2023-10-19

## 2023-10-10 RX ORDER — HYDRALAZINE HCL 50 MG
10 TABLET ORAL EVERY 8 HOURS
Refills: 0 | Status: DISCONTINUED | OUTPATIENT
Start: 2023-10-10 | End: 2023-10-19

## 2023-10-10 RX ORDER — ACETAMINOPHEN 500 MG
975 TABLET ORAL EVERY 8 HOURS
Refills: 0 | Status: DISCONTINUED | OUTPATIENT
Start: 2023-10-10 | End: 2023-10-19

## 2023-10-10 RX ORDER — VANCOMYCIN HCL 1 G
500 VIAL (EA) INTRAVENOUS
Refills: 0 | Status: ACTIVE | OUTPATIENT
Start: 2023-10-10 | End: 2024-09-07

## 2023-10-10 RX ORDER — FENTANYL CITRATE 50 UG/ML
50 INJECTION INTRAVENOUS
Refills: 0 | Status: DISCONTINUED | OUTPATIENT
Start: 2023-10-10 | End: 2023-10-10

## 2023-10-10 RX ORDER — ACETAMINOPHEN 500 MG
1000 TABLET ORAL ONCE
Refills: 0 | Status: DISCONTINUED | OUTPATIENT
Start: 2023-10-10 | End: 2023-10-10

## 2023-10-10 RX ORDER — MUPIROCIN 20 MG/G
1 OINTMENT TOPICAL
Refills: 0 | Status: COMPLETED | OUTPATIENT
Start: 2023-10-10 | End: 2023-10-15

## 2023-10-10 RX ORDER — CEFAZOLIN SODIUM 1 G
2000 VIAL (EA) INJECTION
Refills: 0 | Status: ACTIVE | OUTPATIENT
Start: 2023-10-10 | End: 2024-09-07

## 2023-10-10 RX ORDER — HYDRALAZINE HCL 50 MG
50 TABLET ORAL THREE TIMES A DAY
Refills: 0 | Status: DISCONTINUED | OUTPATIENT
Start: 2023-10-10 | End: 2023-10-19

## 2023-10-10 RX ORDER — ACETAMINOPHEN 500 MG
1000 TABLET ORAL ONCE
Refills: 0 | Status: COMPLETED | OUTPATIENT
Start: 2023-10-10 | End: 2023-10-10

## 2023-10-10 RX ORDER — CITALOPRAM 10 MG/1
20 TABLET, FILM COATED ORAL DAILY
Refills: 0 | Status: DISCONTINUED | OUTPATIENT
Start: 2023-10-10 | End: 2023-10-19

## 2023-10-10 RX ORDER — KETOROLAC TROMETHAMINE 30 MG/ML
15 SYRINGE (ML) INJECTION ONCE
Refills: 0 | Status: DISCONTINUED | OUTPATIENT
Start: 2023-10-10 | End: 2023-10-10

## 2023-10-10 RX ORDER — ATORVASTATIN CALCIUM 80 MG/1
40 TABLET, FILM COATED ORAL AT BEDTIME
Refills: 0 | Status: DISCONTINUED | OUTPATIENT
Start: 2023-10-10 | End: 2023-10-19

## 2023-10-10 RX ORDER — LANOLIN ALCOHOL/MO/W.PET/CERES
3 CREAM (GRAM) TOPICAL AT BEDTIME
Refills: 0 | Status: DISCONTINUED | OUTPATIENT
Start: 2023-10-10 | End: 2023-10-19

## 2023-10-10 RX ORDER — CEFAZOLIN SODIUM 1 G
2000 VIAL (EA) INJECTION ONCE
Refills: 0 | Status: DISCONTINUED | OUTPATIENT
Start: 2023-10-10 | End: 2023-10-10

## 2023-10-10 RX ORDER — LEVOTHYROXINE SODIUM 125 MCG
25 TABLET ORAL DAILY
Refills: 0 | Status: DISCONTINUED | OUTPATIENT
Start: 2023-10-10 | End: 2023-10-19

## 2023-10-10 RX ORDER — FENTANYL CITRATE 50 UG/ML
25 INJECTION INTRAVENOUS
Refills: 0 | Status: DISCONTINUED | OUTPATIENT
Start: 2023-10-10 | End: 2023-10-10

## 2023-10-10 RX ORDER — METOPROLOL TARTRATE 50 MG
100 TABLET ORAL DAILY
Refills: 0 | Status: DISCONTINUED | OUTPATIENT
Start: 2023-10-10 | End: 2023-10-19

## 2023-10-10 RX ORDER — HYDROMORPHONE HYDROCHLORIDE 2 MG/ML
2 INJECTION INTRAMUSCULAR; INTRAVENOUS; SUBCUTANEOUS EVERY 4 HOURS
Refills: 0 | Status: DISCONTINUED | OUTPATIENT
Start: 2023-10-10 | End: 2023-10-10

## 2023-10-10 RX ORDER — PANTOPRAZOLE SODIUM 20 MG/1
40 TABLET, DELAYED RELEASE ORAL
Refills: 0 | Status: DISCONTINUED | OUTPATIENT
Start: 2023-10-10 | End: 2023-10-19

## 2023-10-10 RX ORDER — SENNA PLUS 8.6 MG/1
2 TABLET ORAL AT BEDTIME
Refills: 0 | Status: DISCONTINUED | OUTPATIENT
Start: 2023-10-10 | End: 2023-10-19

## 2023-10-10 RX ORDER — SODIUM CHLORIDE 9 MG/ML
1000 INJECTION INTRAMUSCULAR; INTRAVENOUS; SUBCUTANEOUS
Refills: 0 | Status: DISCONTINUED | OUTPATIENT
Start: 2023-10-10 | End: 2023-10-11

## 2023-10-10 RX ORDER — OXYCODONE HYDROCHLORIDE 5 MG/1
10 TABLET ORAL EVERY 4 HOURS
Refills: 0 | Status: DISCONTINUED | OUTPATIENT
Start: 2023-10-10 | End: 2023-10-11

## 2023-10-10 RX ADMIN — OXYCODONE HYDROCHLORIDE 10 MILLIGRAM(S): 5 TABLET ORAL at 13:00

## 2023-10-10 RX ADMIN — PANTOPRAZOLE SODIUM 40 MILLIGRAM(S): 20 TABLET, DELAYED RELEASE ORAL at 13:01

## 2023-10-10 RX ADMIN — Medication 1 APPLICATION(S): at 08:02

## 2023-10-10 RX ADMIN — Medication 50 MILLIGRAM(S): at 04:38

## 2023-10-10 RX ADMIN — Medication 975 MILLIGRAM(S): at 04:37

## 2023-10-10 RX ADMIN — Medication 25 MICROGRAM(S): at 13:01

## 2023-10-10 RX ADMIN — CITALOPRAM 20 MILLIGRAM(S): 10 TABLET, FILM COATED ORAL at 14:48

## 2023-10-10 RX ADMIN — Medication 975 MILLIGRAM(S): at 13:35

## 2023-10-10 RX ADMIN — Medication 975 MILLIGRAM(S): at 15:50

## 2023-10-10 RX ADMIN — Medication 975 MILLIGRAM(S): at 22:20

## 2023-10-10 RX ADMIN — Medication 25 MICROGRAM(S): at 04:37

## 2023-10-10 RX ADMIN — SODIUM CHLORIDE 75 MILLILITER(S): 9 INJECTION INTRAMUSCULAR; INTRAVENOUS; SUBCUTANEOUS at 13:35

## 2023-10-10 RX ADMIN — Medication 2000 MILLIGRAM(S): at 17:31

## 2023-10-10 RX ADMIN — MUPIROCIN 1 APPLICATION(S): 20 OINTMENT TOPICAL at 04:41

## 2023-10-10 RX ADMIN — CHLORHEXIDINE GLUCONATE 1 APPLICATION(S): 213 SOLUTION TOPICAL at 04:36

## 2023-10-10 RX ADMIN — OXYCODONE HYDROCHLORIDE 10 MILLIGRAM(S): 5 TABLET ORAL at 14:00

## 2023-10-10 RX ADMIN — Medication 400 MILLIGRAM(S): at 11:10

## 2023-10-10 RX ADMIN — Medication 1000 MILLIGRAM(S): at 12:00

## 2023-10-10 RX ADMIN — Medication 100 MILLIGRAM(S): at 13:00

## 2023-10-10 RX ADMIN — Medication 975 MILLIGRAM(S): at 15:09

## 2023-10-10 RX ADMIN — Medication 100 MILLIGRAM(S): at 04:38

## 2023-10-10 RX ADMIN — MUPIROCIN 1 APPLICATION(S): 20 OINTMENT TOPICAL at 17:31

## 2023-10-10 RX ADMIN — Medication 50 MILLIGRAM(S): at 13:01

## 2023-10-10 NOTE — INPATIENT CERTIFICATION FOR MEDICARE PATIENTS - IN ORDER TO MEET MEDICARE REQUIREMENTS.
In order to meet Medicare requirements, the clinical documentation must support the information cited in the admission order.  Please be sure to provide detailed and clear documentation about the following in the admitting note/history and physical:
0 = swallows foods/liquids without difficulty

## 2023-10-10 NOTE — DISCHARGE NOTE PROVIDER - HOSPITAL COURSE
78yoM w/ COPD on 2L home O2, HTN, Hypothyroidism, HTN, CAD s/p PCI, ESRD on HD MWF, h/o LLE DVT (AC had been stopped prior to admission) admitted with left intertrochanteric fracture following mechanical fall. Underwent ORIF. Course notable for provoked RUE DVT as well as severe depression. Case d/w Psych and with heme and at this time will continue current psychiatric meds. Has also been started back on AC with a plan to continue Eliquis 5 mg bid through 10/23 followed by 2.5 mg bid thereafter for 3-6 months. Hemodynamcilly stable, dsc pending VIVIANA

## 2023-10-10 NOTE — PROGRESS NOTE ADULT - ASSESSMENT
78 year old male with PMH of depression, HLD, HTN, CAD s/p stents, COPD, DVT, BPH, ESRD on HD and hx of suicidal ideation where patient reports that he attempted to cut his AV fistula with a knife, s/p AV fistula ligation, now with L chest tunneled dialysis catheter, presents with a fall. Found to have left femoral intertrochanteric fracture. Nephrology is consulted for resumption of hemodialysis in an ESRD patient. Of note, patient complained of a recent UTI, on an unknown oral antibiotics, and inability to empty his bladder. No suprapubic tenderness on palpation.     -Access: L chest tunneled dialysis catheter  -Outpatient dialysis unit is at [place]; outpatient nephrologist - patient is unable to recall  -Outpatient dialysis days are Mondays/ Wednesdays/ Fridays  -Last HD was yesterday; next tx will be tomorrow    -HTN; BP stable, continue home meds    -Volume Status - UF as tolerated during hemodialysis   -Anemia - hemoglobin is at goal; no indication for NORAH at this time  -Mineral Bone Disease - will check phos and PTH    -Left femoral intertrochanteric fracture -s/p OR

## 2023-10-10 NOTE — PROGRESS NOTE ADULT - SUBJECTIVE AND OBJECTIVE BOX
[facility] DIVISION OF KIDNEY DISEASES AND HYPERTENSION -- HEMODIALYSIS NOTE  --------------------------------------------------------------------------------  Chief Complaint: ESRD/Ongoing hemodialysis requirement    24 hour events/subjective:  pt s.p OR today  pt seen and examined; feels well      PAST HISTORY  --------------------------------------------------------------------------------  No significant changes to PMH, PSH, FHx, SHx, unless otherwise noted    ALLERGIES & MEDICATIONS  --------------------------------------------------------------------------------  Allergies  No Known Allergies        Standing Inpatient Medications  acetaminophen     Tablet .. 975 milliGRAM(s) Oral every 8 hours  atorvastatin 40 milliGRAM(s) Oral at bedtime  ceFAZolin  Injectable. 2000 milliGRAM(s) IV Push <User Schedule>  citalopram 20 milliGRAM(s) Oral daily  hydrALAZINE 50 milliGRAM(s) Oral three times a day  levothyroxine 25 MICROGram(s) Oral daily  melatonin 3 milliGRAM(s) Oral at bedtime  metoprolol succinate  milliGRAM(s) Oral daily  mirtazapine 7.5 milliGRAM(s) Oral at bedtime  mupirocin 2% Nasal 1 Application(s) Both Nostrils two times a day  pantoprazole    Tablet 40 milliGRAM(s) Oral before breakfast  senna 2 Tablet(s) Oral at bedtime  sodium chloride 0.9%. 1000 milliLiter(s) IV Continuous <Continuous>  vancomycin  IVPB 500 milliGRAM(s) IV Intermittent <User Schedule>    PRN Inpatient Medications  aluminum hydroxide/magnesium hydroxide/simethicone Suspension 30 milliLiter(s) Oral every 4 hours PRN  hydrALAZINE Injectable 10 milliGRAM(s) IV Push every 8 hours PRN  HYDROmorphone   Tablet 2 milliGRAM(s) Oral every 4 hours PRN  oxyCODONE    IR 10 milliGRAM(s) Oral every 4 hours PRN  oxyCODONE    IR 5 milliGRAM(s) Oral every 4 hours PRN  polyethylene glycol 3350 17 Gram(s) Oral daily PRN      REVIEW OF SYSTEMS  --------------------------------------------------------------------------------  Gen: No weight changes, fatigue, fevers/chills, weakness  Skin: No rashes  Head/Eyes/Ears/Mouth: No headache  Respiratory: No dyspnea, cough,  CV: No chest pain, orthopnea  GI: No abdominal pain, diarrhea, constipation, nausea, vomiting,  MSK: No joint pain  Neuro: No dizziness/lightheadedness, weakness  Heme: No bleeding  Psych: No significant nervousness, anxiety, stress, depression    All other systems were reviewed and are negative, except as noted.    VITALS/PHYSICAL EXAM  --------------------------------------------------------------------------------  T(C): 36.6 (10-10-23 @ 12:11), Max: 36.6 (10-10-23 @ 12:11)  HR: 62 (10-10-23 @ 12:11) (61 - 70)  BP: 152/79 (10-10-23 @ 12:11) (122/69 - 187/69)  RR: 18 (10-10-23 @ 12:11) (14 - 18)  SpO2: 93% (10-10-23 @ 12:11) (92% - 100%)  Wt(kg): --    Weight (kg): 60 (10-10-23 @ 10:50)      10-09-23 @ 07:01  -  10-10-23 @ 07:00  --------------------------------------------------------  IN: 400 mL / OUT: 2500 mL / NET: -2100 mL    10-10-23 @ 07:01  -  10-10-23 @ 16:30  --------------------------------------------------------  IN: 0 mL / OUT: 100 mL / NET: -100 mL      Physical Exam:  	Gen: NAD  	HEENT: supple neck,  	Pulm: CTA B/L  	CV: RRR, S1S2; no rub  	Abd: +BS, soft, nontender  	UE: Warm  	LE: Warm, + edema  	Neuro: No focal deficits  	Psych: Normal affect and mood  	Skin: Warm  	Vascular access: [facility]    LABS/STUDIES  --------------------------------------------------------------------------------              11.8   7.25  >-----------<  156      [10-10-23 @ 08:06]              39.4     133  |  96  |  48.6  ----------------------------<  70      [10-10-23 @ 08:06]  4.7   |  21.0  |  3.91        Ca     8.1     [10-10-23 @ 08:06]      Mg     2.1     [10-10-23 @ 08:06]      Phos  6.4     [10-10-23 @ 08:06]    TPro  6.2  /  Alb  3.7  /  TBili  0.3  /  DBili  x   /  AST  26  /  ALT  16  /  AlkPhos  85  [10-08-23 @ 23:05]    PT/INR: PT 11.7 , INR 1.06       [10-10-23 @ 08:06]  PTT: 29.7       [10-10-23 @ 08:06]      PTH -- (Ca 8.3)      [10-09-23 @ 10:52]   350

## 2023-10-10 NOTE — BRIEF OPERATIVE NOTE - OPERATION/FINDINGS
minimally displaced intertrochanteric hip fracture    implants: S&N Intertan 10x200 mm, 100/95mm lag/compression, 35 mm interlock

## 2023-10-10 NOTE — DISCHARGE NOTE PROVIDER - NSDCMRMEDTOKEN_GEN_ALL_CORE_FT
Albuterol (Eqv-Proventil HFA) 90 mcg/inh inhalation aerosol: 2 puff(s) inhaled every 6 hours, As Needed - for shortness of breath and/or wheezing  atorvastatin 40 mg oral tablet: 1 tab(s) orally once a day (at bedtime)  bisacodyl 10 mg rectal suppository: 1 suppository(ies) rectally once a day, As Needed if no results from MOM  Breo Ellipta 200 mcg-25 mcg/inh inhalation powder: 1 puff(s) inhaled once a day  citalopram 20 mg oral tablet: 1 tab(s) orally once a day  Fleet Enema 19 g-7 g rectal enema: 133 milliliter(s) rectal once if no results from Dulcolax  hydrALAZINE 50 mg oral tablet: 1 tab(s) orally 3 times a day  levothyroxine 25 mcg (0.025 mg) oral tablet: 1 tab(s) orally once a day  Metoprolol Succinate  mg oral tablet, extended release: 1 tab(s) orally once a day  mirtazapine 7.5 mg oral tablet: 1 tab(s) orally once a day (at bedtime)  Multiple Vitamins oral tablet: 1 tab(s) orally once a day  pantoprazole 40 mg oral delayed release tablet: 1 tab(s) orally once a day  Spiriva 18 mcg inhalation capsule: 1 cap(s) inhaled once a day   Albuterol (Eqv-Proventil HFA) 90 mcg/inh inhalation aerosol: 2 puff(s) inhaled every 6 hours, As Needed - for shortness of breath and/or wheezing  apixaban 5 mg oral tablet: 1 tab(s) orally every 12 hours  atorvastatin 40 mg oral tablet: 1 tab(s) orally once a day (at bedtime)  bisacodyl 10 mg rectal suppository: 1 suppository(ies) rectally once a day, As Needed if no results from MOM  Breo Ellipta 200 mcg-25 mcg/inh inhalation powder: 1 puff(s) inhaled once a day  calcium acetate 667 mg oral tablet: 1 tab(s) orally once a day  citalopram 20 mg oral tablet: 1 tab(s) orally once a day  hydrALAZINE 50 mg oral tablet: 1 tab(s) orally 3 times a day  levothyroxine 25 mcg (0.025 mg) oral tablet: 1 tab(s) orally once a day  metoprolol succinate 100 mg oral tablet, extended release: 1 tab(s) orally once a day  mirtazapine 7.5 mg oral tablet: 1 tab(s) orally once a day (at bedtime)  Multiple Vitamins oral tablet: 1 tab(s) orally once a day  oxyCODONE 5 mg oral tablet: 1 tab(s) orally 4 times a day as needed for  severe pain MDD: 4  pantoprazole 40 mg oral delayed release tablet: 1 tab(s) orally once a day  polyethylene glycol 3350 oral powder for reconstitution: 17 gram(s) orally once a day As needed Constipation  senna leaf extract oral tablet: 2 tab(s) orally once a day (at bedtime)  Spiriva 18 mcg inhalation capsule: 1 cap(s) inhaled once a day

## 2023-10-10 NOTE — DISCHARGE NOTE PROVIDER - CARE PROVIDER_API CALL
Julito Regan  Orthopaedic Surgery  [address] [place] [id number]  Phone: [phone]  Fax: [phone]  Follow Up Time:

## 2023-10-10 NOTE — PROGRESS NOTE ADULT - ASSESSMENT
79 yo M with a PMH significant for COPD on 2L Home O2, HTN, Hypothyroidism, HTN, CAD s/p PCI, ESRD on HD MWF, L. peroneal DVT (2/2023) no longer on Eliquis who presents to [facility] ED s/p mechanical fall. Found to have Nondisplaced L. intertrochanteric femur fx, with plans for Orthopedic intervention in AM, and plan for HD today to continue MWF schedule.     #L. Femoral nondisplaced intertrochanteric fracture   s/p Mechanical fall   s/p anterograde nailing of femur 10/10/23  - Analgesics  - Monitor Hgb  - bowel regimen  - Incentive Spirometry  - PT evaluation    #ESRD on HD   Nephrology following, continues on MWF HD, no missed sessions recently   - HD as per schedule  - Continue to monitor renal function, renally dose medications as needed     #COPD on Home Oxygen   not in exacerbation, currently saturating well on 2L NC (baseline)   -Supplemental O2  - Nebs PRN / Pulmicort     #CAD  s/p PCI in past, no longer on DAPT   - continues on ASA, Lipitor, Toprol     #HTN Urgency   elevated BP up to 199/101, started hydralazine PRN for SBP > 170, continued home meds, Toprol, Hydralazine 50 TID, Will likely improve s/p HD.     #Hypothyroidism   Continue home levothyroxine 25 mcg     #Anxiety/Depression   Continue home Mirtazapine, citalopram     #H/O L. peroneal DVT   Noted in documentation 2/2023, was on Eliquis, stopped by outpatient cardiologist per patient when clot no longer seen on imaging     DVT Prophylaxis  - Heparin     Code status: DNR/DNI/ trial NIV     Dispo: Acutely ill, s/p Surgery POD #0

## 2023-10-10 NOTE — DISCHARGE NOTE PROVIDER - NSDCCPCAREPLAN_GEN_ALL_CORE_FT
PRINCIPAL DISCHARGE DIAGNOSIS  Diagnosis: Intertrochanteric fracture of left hip  Assessment and Plan of Treatment:      PRINCIPAL DISCHARGE DIAGNOSIS  Diagnosis: Intertrochanteric fracture of left hip  Assessment and Plan of Treatment: Follow up with PT and Orthopedic surgert      SECONDARY DISCHARGE DIAGNOSES  Diagnosis: Deep vein thrombosis (DVT)  Assessment and Plan of Treatment: Continue Eliquis 5 mg twice a day through 10/23/23 and then change to 2,5 mg twice daily after that

## 2023-10-10 NOTE — PROGRESS NOTE ADULT - SUBJECTIVE AND OBJECTIVE BOX
ORTHOPEDIC POST-OP PROGRESS NOTE:    Name: JULIO WYNNE    MR #: [id number]    Procedure: Intramedulary nail of left hip  Surgeon: Dr. Regan  DOS: 10/10/23    Pt seen and examined at bedside. Patient is stable post operatively. Pt is POD#0. Pt comfortable without complaints, pain controlled. Denies CP, SOB, N/V, numbness/tingling       Vital Signs Last 24 Hrs  T(C): 36.4 (10-10-23 @ 16:53), Max: 36.4 (10-10-23 @ 16:53)  T(F): 97.5 (10-10-23 @ 16:53), Max: 97.5 (10-10-23 @ 16:53)  HR: 58 (10-10-23 @ 16:53) (58 - 58)  BP: 139/69 (10-10-23 @ 16:53) (139/69 - 139/69)  BP(mean): --  RR: 17 (10-10-23 @ 16:53) (17 - 17)  SpO2: 94% (10-10-23 @ 16:53) (94% - 94%)      General Exam:    General: Pt Alert and oriented, NAD, controlled pain.    Dressings: Left LLE C/D/I. No bleeding. No erythema.    Pulses: 2+ dorsalis pedis pulse. Cap refill < 2 sec.    Sensation: Grossly intact to light touch without deficit.    Motor: EHL intact, mildly weak 4/5 strength on left   FHL/AT/GC intact on left   DF/PF intact and normal strength on left     A/P: 78y Male  POD# 0 s/p left hip IMN    - Stable  - Pain Control  - DVT ppx as prescribed  - PT eval pending   - Weight bearing status: WBAT LLE  - ABX per protocol   - Remaining care per primary team  - d/w Dr. Regan

## 2023-10-10 NOTE — BRIEF OPERATIVE NOTE - COMMENTS
Post-op Plan: WBAT, PT/OT, abx per protocol, contralateral SCD, LMWH (or equivalent) x 4 weeks post-op, likely f/u prn or telehealth due to age, medical comorbidities and limited functional status.

## 2023-10-10 NOTE — PROGRESS NOTE ADULT - SUBJECTIVE AND OBJECTIVE BOX
HOSPITALIST PROGRESS NOTE    JULIO WYNNE  [id number]  78yMale    Patient is a 78y old  Male who presents with a chief complaint of L. Femur Intertrochanteric Fracture (10 Oct 2023 08:03)      SUBJECTIVE:   Chart reviewed since since admission.  Patient seen and examined at bedside for left femur fracture, ESRD, COPD  Just got back from OR.  Denies any hip pain, chest pain, dyspnea      OBJECTIVE:  Vital Signs Last 24 Hrs  T(C): 36.6 (10 Oct 2023 12:11), Max: 36.6 (10 Oct 2023 12:11)  T(F): 97.8 (10 Oct 2023 12:11), Max: 97.8 (10 Oct 2023 12:11)  HR: 62 (10 Oct 2023 12:11) (60 - 70)  BP: 152/79 (10 Oct 2023 12:11) (122/69 - 187/69)  BP(mean): 77 (10 Oct 2023 11:45) (77 - 97)  RR: 18 (10 Oct 2023 12:11) (14 - 18)  SpO2: 93% (10 Oct 2023 12:11) (92% - 100%)    Parameters below as of 10 Oct 2023 11:45  Patient On (Oxygen Delivery Method): nasal cannula  O2 Flow (L/min): 2      PHYSICAL EXAMINATION  General: Elderly male, pleasant, lying in bed, comfortable  HEENT:  Atraumatic normocephalic, extraocular movements intact, dry oral mucosa  NECK:  Supple  CVS: regular rate and rhythm S1 S2  RESP:  CTAB  GI:  Soft nondistended nontender BS+  : Bae  MSK:  Right hip dressing C/D/I  CNS:  Awake, oriented x 3. Follows simple commands  INTEG:  warm dry skin. left chest wall PermCath  PSYCH:  Fair mood    MONITOR:  CAPILLARY BLOOD GLUCOSE            I&O's Summary    09 Oct 2023 07:01  -  10 Oct 2023 07:00  --------------------------------------------------------  IN: 400 mL / OUT: 2500 mL / NET: -2100 mL    10 Oct 2023 07:01  -  10 Oct 2023 14:43  --------------------------------------------------------  IN: 0 mL / OUT: 100 mL / NET: -100 mL                            11.8   7.25  )-----------( 156      ( 10 Oct 2023 08:06 )             39.4     PT/INR - ( 10 Oct 2023 08:06 )   PT: 11.7 sec;   INR: 1.06 ratio         PTT - ( 10 Oct 2023 08:06 )  PTT:29.7 sec  10-10    133<L>  |  96  |  48.6<H>  ----------------------------<  70  4.7   |  21.0<L>  |  3.91<H>    Ca    8.1<L>      10 Oct 2023 08:06  Phos  6.4     10-10  Mg     2.1     10-10    TPro  6.2<L>  /  Alb  3.7  /  TBili  0.3<L>  /  DBili  x   /  AST  26  /  ALT  16  /  AlkPhos  85  10-08        Urinalysis Basic - ( 10 Oct 2023 08:06 )    Color: x / Appearance: x / SG: x / pH: x  Gluc: 70 mg/dL / Ketone: x  / Bili: x / Urobili: x   Blood: x / Protein: x / Nitrite: x   Leuk Esterase: x / RBC: x / WBC x   Sq Epi: x / Non Sq Epi: x / Bacteria: x        Culture:    TTE:    RADIOLOGY    < from: Xray Chest 1 View AP/PA. (10.09.23 @ 00:53) >  IMPRESSION:    Left hip: Nondisplaced intratrochanteric fracture noted. Follow-up   suggested. Osteopenia noted.    Portable chest: Exam taken October 9 at 12:20 AM shows heart to be at the   upper limits of normal in its transthoracic diameter. Tunneled dialysis   catheter tip in the SVC/RA region. Slight prominence to perihilar   interstitial markings. No focal infiltrate. No fracture or pneumothorax   noted. Improvement in aeration since previous exam January 30, 2023.    < end of copied text >      < from: CT Head No Cont (10.09.23 @ 02:59) >    IMPRESSION:  No acute intracranial hemorrhage or mass effect.    < end of copied text >      < from: CT Pelvis Bony Only No Cont (10.09.23 @ 05:14) >  IMPRESSION:  Left femoral intertrochanteric fracture.    < end of copied text >        MEDICATIONS  (STANDING):  acetaminophen     Tablet .. 975 milliGRAM(s) Oral every 8 hours  atorvastatin 40 milliGRAM(s) Oral at bedtime  ceFAZolin  Injectable. 2000 milliGRAM(s) IV Push <User Schedule>  citalopram 20 milliGRAM(s) Oral daily  hydrALAZINE 50 milliGRAM(s) Oral three times a day  levothyroxine 25 MICROGram(s) Oral daily  melatonin 3 milliGRAM(s) Oral at bedtime  metoprolol succinate  milliGRAM(s) Oral daily  mirtazapine 7.5 milliGRAM(s) Oral at bedtime  mupirocin 2% Nasal 1 Application(s) Both Nostrils two times a day  pantoprazole    Tablet 40 milliGRAM(s) Oral before breakfast  sodium chloride 0.9%. 1000 milliLiter(s) (75 mL/Hr) IV Continuous <Continuous>  vancomycin  IVPB 500 milliGRAM(s) IV Intermittent <User Schedule>      MEDICATIONS  (PRN):  aluminum hydroxide/magnesium hydroxide/simethicone Suspension 30 milliLiter(s) Oral every 4 hours PRN Dyspepsia  hydrALAZINE Injectable 10 milliGRAM(s) IV Push every 8 hours PRN SBP>170  HYDROmorphone   Tablet 2 milliGRAM(s) Oral every 4 hours PRN Severe Pain (7 - 10)  oxyCODONE    IR 5 milliGRAM(s) Oral every 4 hours PRN Mild Pain (1 - 3)  oxyCODONE    IR 10 milliGRAM(s) Oral every 4 hours PRN Moderate Pain (4 - 6)

## 2023-10-10 NOTE — DISCHARGE NOTE PROVIDER - ATTENDING DISCHARGE PHYSICAL EXAMINATION:
Gen : Non toxic appearing, comfortable, NAD  HEENT : NCAT, MMM, No cervical lymphadenopathy, no JVD  CVS : S1S2, regular rate and rhythm, no murmurs, ant Chest wall HD cath   Resp : CTA b/l, no rhonchi or wheezes appreciated   Abd : Soft, non distended, non tender, BS +ve   MSK : LLE limited ROM,   Neuro : CN II-XII intact, no focal motor or sensory deficits   Psych : Pleasant, no anxiety, normal affect

## 2023-10-10 NOTE — DISCHARGE NOTE PROVIDER - NSDCFUADDINST_GEN_ALL_CORE_FT
The patient will be seen in the office between 2-3 weeks for wound check. Sutures/Staples/Tape will be removed at that time. Patient may shower after post-op day #5. The dressing is to be removed on day # 7. The patient will contact the office if the wound becomes red, has increasing pain, develops bleeding or discharge, an injury occurs, or has other concerns. The patient will continue PT for gait training.  The patient will continue LOVENOX for 4 weeks for DVTP. The patient will take oxycodone for pain control and titrate according to prescription and patient needs. The patient is FULL weight bearing. The patient will continue mupirocin for total of 5 days post op

## 2023-10-11 DIAGNOSIS — S72.142A DISPLACED INTERTROCHANTERIC FRACTURE OF LEFT FEMUR, INITIAL ENCOUNTER FOR CLOSED FRACTURE: ICD-10-CM

## 2023-10-11 DIAGNOSIS — N18.6 END STAGE RENAL DISEASE: ICD-10-CM

## 2023-10-11 DIAGNOSIS — E03.9 HYPOTHYROIDISM, UNSPECIFIED: ICD-10-CM

## 2023-10-11 DIAGNOSIS — J44.9 CHRONIC OBSTRUCTIVE PULMONARY DISEASE, UNSPECIFIED: ICD-10-CM

## 2023-10-11 DIAGNOSIS — I25.10 ATHEROSCLEROTIC HEART DISEASE OF NATIVE CORONARY ARTERY WITHOUT ANGINA PECTORIS: ICD-10-CM

## 2023-10-11 LAB
ANION GAP SERPL CALC-SCNC: 16 MMOL/L — SIGNIFICANT CHANGE UP (ref 5–17)
BASOPHILS # BLD AUTO: 0.03 K/UL — SIGNIFICANT CHANGE UP (ref 0–0.2)
BASOPHILS NFR BLD AUTO: 0.4 % — SIGNIFICANT CHANGE UP (ref 0–2)
BUN SERPL-MCNC: 60.7 MG/DL — HIGH (ref 8–20)
CALCIUM SERPL-MCNC: 7.5 MG/DL — LOW (ref 8.4–10.5)
CHLORIDE SERPL-SCNC: 98 MMOL/L — SIGNIFICANT CHANGE UP (ref 96–108)
CO2 SERPL-SCNC: 22 MMOL/L — SIGNIFICANT CHANGE UP (ref 22–29)
CREAT SERPL-MCNC: 5.1 MG/DL — HIGH (ref 0.5–1.3)
EGFR: 11 ML/MIN/1.73M2 — LOW
EOSINOPHIL # BLD AUTO: 0.34 K/UL — SIGNIFICANT CHANGE UP (ref 0–0.5)
EOSINOPHIL NFR BLD AUTO: 4.2 % — SIGNIFICANT CHANGE UP (ref 0–6)
GLUCOSE SERPL-MCNC: 100 MG/DL — HIGH (ref 70–99)
HCT VFR BLD CALC: 36.9 % — LOW (ref 39–50)
HGB BLD-MCNC: 10.9 G/DL — LOW (ref 13–17)
IMM GRANULOCYTES NFR BLD AUTO: 0.5 % — SIGNIFICANT CHANGE UP (ref 0–0.9)
LYMPHOCYTES # BLD AUTO: 0.32 K/UL — LOW (ref 1–3.3)
LYMPHOCYTES # BLD AUTO: 3.9 % — LOW (ref 13–44)
MCHC RBC-ENTMCNC: 29.5 GM/DL — LOW (ref 32–36)
MCHC RBC-ENTMCNC: 30.6 PG — SIGNIFICANT CHANGE UP (ref 27–34)
MCV RBC AUTO: 103.7 FL — HIGH (ref 80–100)
MONOCYTES # BLD AUTO: 0.58 K/UL — SIGNIFICANT CHANGE UP (ref 0–0.9)
MONOCYTES NFR BLD AUTO: 7.1 % — SIGNIFICANT CHANGE UP (ref 2–14)
NEUTROPHILS # BLD AUTO: 6.81 K/UL — SIGNIFICANT CHANGE UP (ref 1.8–7.4)
NEUTROPHILS NFR BLD AUTO: 83.9 % — HIGH (ref 43–77)
PLATELET # BLD AUTO: 172 K/UL — SIGNIFICANT CHANGE UP (ref 150–400)
POTASSIUM SERPL-MCNC: 5.7 MMOL/L — HIGH (ref 3.5–5.3)
POTASSIUM SERPL-SCNC: 5.7 MMOL/L — HIGH (ref 3.5–5.3)
RBC # BLD: 3.56 M/UL — LOW (ref 4.2–5.8)
RBC # FLD: 17.7 % — HIGH (ref 10.3–14.5)
SODIUM SERPL-SCNC: 136 MMOL/L — SIGNIFICANT CHANGE UP (ref 135–145)
WBC # BLD: 8.12 K/UL — SIGNIFICANT CHANGE UP (ref 3.8–10.5)
WBC # FLD AUTO: 8.12 K/UL — SIGNIFICANT CHANGE UP (ref 3.8–10.5)

## 2023-10-11 PROCEDURE — 90935 HEMODIALYSIS ONE EVALUATION: CPT

## 2023-10-11 PROCEDURE — 99232 SBSQ HOSP IP/OBS MODERATE 35: CPT

## 2023-10-11 RX ORDER — CALCIUM ACETATE 667 MG
667 TABLET ORAL
Refills: 0 | Status: DISCONTINUED | OUTPATIENT
Start: 2023-10-11 | End: 2023-10-19

## 2023-10-11 RX ADMIN — OXYCODONE HYDROCHLORIDE 10 MILLIGRAM(S): 5 TABLET ORAL at 03:30

## 2023-10-11 RX ADMIN — Medication 50 MILLIGRAM(S): at 21:30

## 2023-10-11 RX ADMIN — OXYCODONE HYDROCHLORIDE 10 MILLIGRAM(S): 5 TABLET ORAL at 02:30

## 2023-10-11 RX ADMIN — SENNA PLUS 2 TABLET(S): 8.6 TABLET ORAL at 21:30

## 2023-10-11 RX ADMIN — Medication 975 MILLIGRAM(S): at 05:23

## 2023-10-11 RX ADMIN — ATORVASTATIN CALCIUM 40 MILLIGRAM(S): 80 TABLET, FILM COATED ORAL at 21:30

## 2023-10-11 RX ADMIN — OXYCODONE HYDROCHLORIDE 5 MILLIGRAM(S): 5 TABLET ORAL at 22:30

## 2023-10-11 RX ADMIN — ENOXAPARIN SODIUM 40 MILLIGRAM(S): 100 INJECTION SUBCUTANEOUS at 05:23

## 2023-10-11 RX ADMIN — Medication 975 MILLIGRAM(S): at 21:54

## 2023-10-11 RX ADMIN — OXYCODONE HYDROCHLORIDE 5 MILLIGRAM(S): 5 TABLET ORAL at 21:29

## 2023-10-11 RX ADMIN — CITALOPRAM 20 MILLIGRAM(S): 10 TABLET, FILM COATED ORAL at 11:18

## 2023-10-11 RX ADMIN — Medication 667 MILLIGRAM(S): at 15:55

## 2023-10-11 RX ADMIN — Medication 975 MILLIGRAM(S): at 14:26

## 2023-10-11 RX ADMIN — MIRTAZAPINE 7.5 MILLIGRAM(S): 45 TABLET, ORALLY DISINTEGRATING ORAL at 21:29

## 2023-10-11 RX ADMIN — Medication 25 MICROGRAM(S): at 05:24

## 2023-10-11 RX ADMIN — Medication 975 MILLIGRAM(S): at 05:31

## 2023-10-11 RX ADMIN — Medication 3 MILLIGRAM(S): at 21:29

## 2023-10-11 RX ADMIN — PANTOPRAZOLE SODIUM 40 MILLIGRAM(S): 20 TABLET, DELAYED RELEASE ORAL at 05:24

## 2023-10-11 RX ADMIN — Medication 975 MILLIGRAM(S): at 13:58

## 2023-10-11 RX ADMIN — Medication 667 MILLIGRAM(S): at 11:23

## 2023-10-11 RX ADMIN — MUPIROCIN 1 APPLICATION(S): 20 OINTMENT TOPICAL at 05:23

## 2023-10-11 RX ADMIN — Medication 975 MILLIGRAM(S): at 21:30

## 2023-10-11 NOTE — PHYSICAL THERAPY INITIAL EVALUATION ADULT - ADDITIONAL COMMENTS
Pt reports independent PTA, owns rollator. Pt lives with son and daughter in law who both work and are unavailable during day.

## 2023-10-11 NOTE — PROGRESS NOTE ADULT - ASSESSMENT
77 yo M with a PMH significant for COPD on 2L Home O2, HTN, Hypothyroidism, HTN, CAD s/p PCI, ESRD on HD MWF, L. peroneal DVT (2/2023) no longer on Eliquis who presents to [facility] ED s/p mechanical fall. Found to have Nondisplaced L. intertrochanteric femur fx, with plans for Orthopedic intervention in AM, and plan for HD today to continue MWF schedule.     #L. Femoral nondisplaced intertrochanteric fracture   s/p Mechanical fall   s/p anterograde nailing of femur 10/10/23  - Analgesics  - Monitor Hgb  - bowel regimen  - Incentive Spirometry  - PT evaluation    #ESRD on HD   Nephrology following, continues on MWF HD, no missed sessions recently   - HD as per schedule  - Continue to monitor renal function, renally dose medications as needed     #COPD on Home Oxygen   Not in exacerbation, currently saturating well on 2L NC (baseline)   - Supplemental O2  - Nebs PRN / Pulmicort     #CAD  s/p PCI in past, no longer on DAPT   - continues on ASA, Lipitor, Toprol     #HTN Urgency   elevated BP up to 199/101, started hydralazine PRN for SBP > 170, continued home meds, Toprol, Hydralazine 50 TID, Will likely improve s/p HD.   Resolved    #Hypothyroidism   - Levothyroxine 25 mcg     #Anxiety/Depression   - Mirtazapine, citalopram     #H/O L. peroneal DVT   Noted in documentation 2/2023, was on Eliquis, stopped by outpatient cardiologist per patient when clot no longer seen on imaging     DVT Prophylaxis  - LMWH    Code status: DNR/DNI/ trial NIV     Dispo: Acutely ill, s/p Surgery POD #1. Pending PT

## 2023-10-11 NOTE — PHYSICAL THERAPY INITIAL EVALUATION ADULT - PERTINENT HX OF CURRENT PROBLEM, REHAB EVAL
Pt is 77 yo M with a PMH significant for COPD on 2L Home O2, HTN, Hypothyroidism, HTN, CAD s/p PCI, ESRD on HD MWF, L. peroneal DVT (2/2023) no longer on Eliquis who presents to [facility] ED s/p mechanical fall, now s/p anterograde nailing of femur

## 2023-10-11 NOTE — PROGRESS NOTE ADULT - SUBJECTIVE AND OBJECTIVE BOX
[facility] DIVISION OF KIDNEY DISEASES AND HYPERTENSION -- HEMODIALYSIS NOTE  --------------------------------------------------------------------------------  Chief Complaint: ESRD/Ongoing hemodialysis requirement    24 hour events/subjective:  sp OR yesterday      PAST HISTORY  --------------------------------------------------------------------------------  No significant changes to PMH, PSH, FHx, SHx, unless otherwise noted    ALLERGIES & MEDICATIONS  --------------------------------------------------------------------------------  Allergies    No Known Allergies      Standing Inpatient Medications  acetaminophen     Tablet .. 975 milliGRAM(s) Oral every 8 hours  atorvastatin 40 milliGRAM(s) Oral at bedtime  calcium acetate 667 milliGRAM(s) Oral three times a day with meals  citalopram 20 milliGRAM(s) Oral daily  enoxaparin Injectable 40 milliGRAM(s) SubCutaneous every 24 hours  hydrALAZINE 50 milliGRAM(s) Oral three times a day  levothyroxine 25 MICROGram(s) Oral daily  melatonin 3 milliGRAM(s) Oral at bedtime  metoprolol succinate  milliGRAM(s) Oral daily  mirtazapine 7.5 milliGRAM(s) Oral at bedtime  mupirocin 2% Nasal 1 Application(s) Both Nostrils two times a day  pantoprazole    Tablet 40 milliGRAM(s) Oral before breakfast  senna 2 Tablet(s) Oral at bedtime    PRN Inpatient Medications  aluminum hydroxide/magnesium hydroxide/simethicone Suspension 30 milliLiter(s) Oral every 4 hours PRN  hydrALAZINE Injectable 10 milliGRAM(s) IV Push every 8 hours PRN  HYDROmorphone   Tablet 2 milliGRAM(s) Oral every 4 hours PRN  oxyCODONE    IR 5 milliGRAM(s) Oral every 4 hours PRN  oxyCODONE    IR 10 milliGRAM(s) Oral every 4 hours PRN  polyethylene glycol 3350 17 Gram(s) Oral daily PRN      REVIEW OF SYSTEMS  --------------------------------------------------------------------------------  Gen: No weight changes, fatigue, fevers/chills, weakness  Skin: No rashes  Head/Eyes/Ears/Mouth: No headache  Respiratory: No dyspnea, cough,  CV: No chest pain, orthopnea  GI: No abdominal pain, diarrhea, constipation, nausea, vomiting,  MSK: No joint pain  Neuro: No dizziness/lightheadedness, weakness  Heme: No bleeding  Psych: No significant nervousness, anxiety, stress, depression    All other systems were reviewed and are negative, except as noted.    VITALS/PHYSICAL EXAM  --------------------------------------------------------------------------------  T(C): 37 (10-11-23 @ 16:59), Max: 37 (10-11-23 @ 16:59)  HR: 62 (10-11-23 @ 16:59) (56 - 62)  BP: 124/66 (10-11-23 @ 16:59) (124/66 - 137/58)  RR: 19 (10-11-23 @ 16:59) (17 - 19)  SpO2: 96% (10-11-23 @ 16:59) (96% - 98%)  Wt(kg): --    Weight (kg): 60 (10-10-23 @ 10:50)      10-10-23 @ 07:01  -  10-11-23 @ 07:00  --------------------------------------------------------  IN: 0 mL / OUT: 550 mL / NET: -550 mL    10-11-23 @ 07:01  -  10-11-23 @ 20:14  --------------------------------------------------------  IN: 720 mL / OUT: 0 mL / NET: 720 mL      Physical Exam:  	Gen: NAD,  	HEENT: PERRL, supple neck,  	Pulm: CTA B/L  	CV: RRR, S1S2; no rub  	Abd: +BS, soft, nontender  	UE: Warm  	LE: Warm, no edema  	Neuro: No focal deficits  	Psych: Normal affect and mood  	Skin: Warm  	Vascular access: [facility]    LABS/STUDIES  --------------------------------------------------------------------------------              10.9   8.12  >-----------<  172      [10-11-23 @ 05:50]              36.9     136  |  98  |  60.7  ----------------------------<  100      [10-11-23 @ 05:50]  5.7   |  22.0  |  5.10        Ca     7.5     [10-11-23 @ 05:50]      Mg     2.1     [10-10-23 @ 08:06]      Phos  6.4     [10-10-23 @ 08:06]      PT/INR: PT 11.7 , INR 1.06       [10-10-23 @ 08:06]  PTT: 29.7       [10-10-23 @ 08:06]      PTH -- (Ca 8.3)      [10-09-23 @ 10:52]   350

## 2023-10-11 NOTE — PROGRESS NOTE ADULT - ASSESSMENT
78 year old male with PMH of depression, HLD, HTN, CAD s/p stents, COPD, DVT, BPH, ESRD on HD and hx of suicidal ideation where patient reports that he attempted to cut his AV fistula with a knife, s/p AV fistula ligation, now with L chest tunneled dialysis catheter, presents with a fall. Found to have left femoral intertrochanteric fracture. Nephrology is consulted for resumption of hemodialysis in an ESRD patient. Of note, patient complained of a recent UTI, on an unknown oral antibiotics, and inability to empty his bladder. No suprapubic tenderness on palpation.     ESRD on HD  -Access: L chest tunneled dialysis catheter  -Outpatient dialysis unit is at [facility]  -Outpatient dialysis days are Mondays/ Wednesdays/ Fridays  HD today    -HTN; BP stable, continue home meds    -Volume Status - UF as tolerated during hemodialysis   -Anemia - hemoglobin is at goal; no indication for NORAH at this time  -Mineral Bone Disease - will check phos and PTH    -Left femoral intertrochanteric fracture -s/p surgical fixation     no

## 2023-10-11 NOTE — PROGRESS NOTE ADULT - SUBJECTIVE AND OBJECTIVE BOX
HOSPITALIST PROGRESS NOTE    JULIO WYNNE  [id number]  78yMale    Patient is a 78y old  Male who presents with a chief complaint of L. Femur Intertrochanteric Fracture (11 Oct 2023 08:10)      SUBJECTIVE:   Chart reviewed since last visit.  Patient seen and examined at bedside for femur fracture, ESRD  Pain controlled  Denies any dyspnea, dizziness, chest pain palpitations  Voided after Bae removed      OBJECTIVE:  Vital Signs Last 24 Hrs  T(C): 36.6 (11 Oct 2023 05:00), Max: 36.8 (10 Oct 2023 20:56)  T(F): 97.9 (11 Oct 2023 05:00), Max: 98.3 (10 Oct 2023 20:56)  HR: 59 (11 Oct 2023 05:00) (56 - 59)  BP: 137/58 (11 Oct 2023 05:00) (134/73 - 139/69)   RR: 18 (11 Oct 2023 05:00) (17 - 18)  SpO2: 96% (11 Oct 2023 05:00) (94% - 98%)    Parameters below as of 11 Oct 2023 05:00  Patient On (Oxygen Delivery Method): nasal cannula  O2 Flow (L/min): 3    PHYSICAL EXAMINATION  General: Elderly male, pleasant, lying in bed, comfortable  HEENT:  Atraumatic normocephalic, extraocular movements intact, dry oral mucosa  NECK:  Supple  CVS: regular rate and rhythm S1 S2  RESP:  CTAB  GI:  Soft nondistended nontender BS+  : Bae  MSK:  Right hip dressing C/D/I  CNS:  Awake, oriented x 3. Follows simple commands  INTEG:  warm dry skin. left chest wall PermCath  PSYCH:  Fair mood    MONITOR:  CAPILLARY BLOOD GLUCOSE            I&O's Summary    10 Oct 2023 07:01  -  11 Oct 2023 07:00  --------------------------------------------------------  IN: 0 mL / OUT: 550 mL / NET: -550 mL                            10.9   8.12  )-----------( 172      ( 11 Oct 2023 05:50 )             36.9     PT/INR - ( 10 Oct 2023 08:06 )   PT: 11.7 sec;   INR: 1.06 ratio         PTT - ( 10 Oct 2023 08:06 )  PTT:29.7 sec  10-11    136  |  98  |  60.7<H>  ----------------------------<  100<H>  5.7<H>   |  22.0  |  5.10<H>    Ca    7.5<L>      11 Oct 2023 05:50  Phos  6.4     10-10  Mg     2.1     10-10          Urinalysis Basic - ( 11 Oct 2023 05:50 )    Color: x / Appearance: x / SG: x / pH: x  Gluc: 100 mg/dL / Ketone: x  / Bili: x / Urobili: x   Blood: x / Protein: x / Nitrite: x   Leuk Esterase: x / RBC: x / WBC x   Sq Epi: x / Non Sq Epi: x / Bacteria: x        Culture:    TTE:    RADIOLOGY        MEDICATIONS  (STANDING):  acetaminophen     Tablet .. 975 milliGRAM(s) Oral every 8 hours  atorvastatin 40 milliGRAM(s) Oral at bedtime  calcium acetate 667 milliGRAM(s) Oral three times a day with meals  citalopram 20 milliGRAM(s) Oral daily  enoxaparin Injectable 40 milliGRAM(s) SubCutaneous every 24 hours  hydrALAZINE 50 milliGRAM(s) Oral three times a day  levothyroxine 25 MICROGram(s) Oral daily  melatonin 3 milliGRAM(s) Oral at bedtime  metoprolol succinate  milliGRAM(s) Oral daily  mirtazapine 7.5 milliGRAM(s) Oral at bedtime  mupirocin 2% Nasal 1 Application(s) Both Nostrils two times a day  pantoprazole    Tablet 40 milliGRAM(s) Oral before breakfast  senna 2 Tablet(s) Oral at bedtime      MEDICATIONS  (PRN):  aluminum hydroxide/magnesium hydroxide/simethicone Suspension 30 milliLiter(s) Oral every 4 hours PRN Dyspepsia  hydrALAZINE Injectable 10 milliGRAM(s) IV Push every 8 hours PRN SBP>170  HYDROmorphone   Tablet 2 milliGRAM(s) Oral every 4 hours PRN Severe Pain (7 - 10)  oxyCODONE    IR 5 milliGRAM(s) Oral every 4 hours PRN Mild Pain (1 - 3)  oxyCODONE    IR 10 milliGRAM(s) Oral every 4 hours PRN Moderate Pain (4 - 6)  polyethylene glycol 3350 17 Gram(s) Oral daily PRN Constipation

## 2023-10-11 NOTE — PROGRESS NOTE ADULT - SUBJECTIVE AND OBJECTIVE BOX
ORTHOPEDIC POST-OP PROGRESS NOTE:    Name: JULIO WYNNE    MR #: [id number]    Procedure: Intramedulary nail of left femur       DOS: 10/10/23      Pt comfortable without complaints, pain controlled. Denies CP, SOB, N/V, numbness/tingling.         Vital Signs Last 24 Hrs  T(C): 36.6 (10-11-23 @ 05:00), Max: 36.6 (10-11-23 @ 05:00)  T(F): 97.9 (10-11-23 @ 05:00), Max: 97.9 (10-11-23 @ 05:00)  HR: 59 (10-11-23 @ 05:00) (59 - 59)  BP: 137/58 (10-11-23 @ 05:00) (137/58 - 137/58)  BP(mean): --  RR: 18 (10-11-23 @ 05:00) (18 - 18)  SpO2: 96% (10-11-23 @ 05:00) (96% - 96%)      General Exam:    General: Pt Alert and oriented, NAD, controlled pain.    Dressings C/D/I. No bleeding. No erythema.    Skin: No erythema. No wound dehisence.    Pulses: 2+ dorsalis pedis pulse. Cap refill < 2 sec.    Sensation: Grossly intact to light touch without deficit.    Motor: Left EHL 4/5, all other muscles 5/5 in b/l LE's        A/P: 78y Male  POD#1  s/p intramedullary rodding of left femur     - Stable  - Pain Control  - DVT ppx as prescribed  - PT eval  - Weight bearing status: WBAT

## 2023-10-12 LAB
ANION GAP SERPL CALC-SCNC: 11 MMOL/L — SIGNIFICANT CHANGE UP (ref 5–17)
BUN SERPL-MCNC: 31.8 MG/DL — HIGH (ref 8–20)
CALCIUM SERPL-MCNC: 7.7 MG/DL — LOW (ref 8.4–10.5)
CHLORIDE SERPL-SCNC: 99 MMOL/L — SIGNIFICANT CHANGE UP (ref 96–108)
CO2 SERPL-SCNC: 27 MMOL/L — SIGNIFICANT CHANGE UP (ref 22–29)
CREAT SERPL-MCNC: 3.79 MG/DL — HIGH (ref 0.5–1.3)
EGFR: 15 ML/MIN/1.73M2 — LOW
GLUCOSE SERPL-MCNC: 98 MG/DL — SIGNIFICANT CHANGE UP (ref 70–99)
HCT VFR BLD CALC: 34.8 % — LOW (ref 39–50)
HGB BLD-MCNC: 10.5 G/DL — LOW (ref 13–17)
MCHC RBC-ENTMCNC: 30.2 GM/DL — LOW (ref 32–36)
MCHC RBC-ENTMCNC: 30.7 PG — SIGNIFICANT CHANGE UP (ref 27–34)
MCV RBC AUTO: 101.8 FL — HIGH (ref 80–100)
PLATELET # BLD AUTO: 157 K/UL — SIGNIFICANT CHANGE UP (ref 150–400)
POTASSIUM SERPL-MCNC: 4.4 MMOL/L — SIGNIFICANT CHANGE UP (ref 3.5–5.3)
POTASSIUM SERPL-SCNC: 4.4 MMOL/L — SIGNIFICANT CHANGE UP (ref 3.5–5.3)
RBC # BLD: 3.42 M/UL — LOW (ref 4.2–5.8)
RBC # FLD: 17.2 % — HIGH (ref 10.3–14.5)
SODIUM SERPL-SCNC: 137 MMOL/L — SIGNIFICANT CHANGE UP (ref 135–145)
WBC # BLD: 6.77 K/UL — SIGNIFICANT CHANGE UP (ref 3.8–10.5)
WBC # FLD AUTO: 6.77 K/UL — SIGNIFICANT CHANGE UP (ref 3.8–10.5)

## 2023-10-12 PROCEDURE — 99233 SBSQ HOSP IP/OBS HIGH 50: CPT

## 2023-10-12 PROCEDURE — 99232 SBSQ HOSP IP/OBS MODERATE 35: CPT

## 2023-10-12 RX ORDER — CHLORHEXIDINE GLUCONATE 213 G/1000ML
1 SOLUTION TOPICAL DAILY
Refills: 0 | Status: DISCONTINUED | OUTPATIENT
Start: 2023-10-12 | End: 2023-10-19

## 2023-10-12 RX ADMIN — Medication 50 MILLIGRAM(S): at 14:31

## 2023-10-12 RX ADMIN — Medication 100 MILLIGRAM(S): at 05:29

## 2023-10-12 RX ADMIN — Medication 50 MILLIGRAM(S): at 21:11

## 2023-10-12 RX ADMIN — ATORVASTATIN CALCIUM 40 MILLIGRAM(S): 80 TABLET, FILM COATED ORAL at 21:11

## 2023-10-12 RX ADMIN — Medication 975 MILLIGRAM(S): at 21:11

## 2023-10-12 RX ADMIN — Medication 50 MILLIGRAM(S): at 05:28

## 2023-10-12 RX ADMIN — PANTOPRAZOLE SODIUM 40 MILLIGRAM(S): 20 TABLET, DELAYED RELEASE ORAL at 05:28

## 2023-10-12 RX ADMIN — Medication 667 MILLIGRAM(S): at 08:56

## 2023-10-12 RX ADMIN — MUPIROCIN 1 APPLICATION(S): 20 OINTMENT TOPICAL at 18:19

## 2023-10-12 RX ADMIN — Medication 25 MICROGRAM(S): at 05:28

## 2023-10-12 RX ADMIN — Medication 667 MILLIGRAM(S): at 18:04

## 2023-10-12 RX ADMIN — Medication 975 MILLIGRAM(S): at 05:28

## 2023-10-12 RX ADMIN — Medication 3 MILLIGRAM(S): at 21:10

## 2023-10-12 RX ADMIN — Medication 975 MILLIGRAM(S): at 15:00

## 2023-10-12 RX ADMIN — MUPIROCIN 1 APPLICATION(S): 20 OINTMENT TOPICAL at 05:28

## 2023-10-12 RX ADMIN — SENNA PLUS 2 TABLET(S): 8.6 TABLET ORAL at 21:11

## 2023-10-12 RX ADMIN — ENOXAPARIN SODIUM 40 MILLIGRAM(S): 100 INJECTION SUBCUTANEOUS at 05:28

## 2023-10-12 RX ADMIN — CITALOPRAM 20 MILLIGRAM(S): 10 TABLET, FILM COATED ORAL at 11:11

## 2023-10-12 RX ADMIN — Medication 975 MILLIGRAM(S): at 05:36

## 2023-10-12 RX ADMIN — Medication 975 MILLIGRAM(S): at 14:27

## 2023-10-12 RX ADMIN — CHLORHEXIDINE GLUCONATE 1 APPLICATION(S): 213 SOLUTION TOPICAL at 11:12

## 2023-10-12 RX ADMIN — Medication 975 MILLIGRAM(S): at 22:11

## 2023-10-12 RX ADMIN — MIRTAZAPINE 7.5 MILLIGRAM(S): 45 TABLET, ORALLY DISINTEGRATING ORAL at 21:11

## 2023-10-12 NOTE — OCCUPATIONAL THERAPY INITIAL EVALUATION ADULT - ADDITIONAL COMMENTS
Pt lives with his son and daughter-in-law in a private home, who are not available to assist. There are 3 FLORA and all needs are accessible on the first level.  Bathroom has a walk-in shower with grab bars.  Pt owns a rollator and shower chair.  Pt is right handed.

## 2023-10-12 NOTE — PROGRESS NOTE ADULT - ASSESSMENT
78 year old male with PMH of depression, HLD, HTN, CAD s/p stents, COPD, DVT, BPH, ESRD on HD and hx of suicidal ideation where patient reports that he attempted to cut his AV fistula with a knife, s/p AV fistula ligation, now with L chest tunneled dialysis catheter, presents with a fall. Found to have left femoral intertrochanteric fracture. Nephrology is consulted for resumption of hemodialysis in an ESRD patient. Of note, patient complained of a recent UTI, on an unknown oral antibiotics, and inability to empty his bladder. No suprapubic tenderness on palpation.     ESRD on HD  -Access: L chest tunneled dialysis catheter  -Outpatient dialysis unit is at [place]  -Outpatient dialysis days are Mondays/ Wednesdays/ Fridays  HD tomorrow    HTN; BP stable, continue home meds    -Volume Status - UF as tolerated during hemodialysis   -Anemia - hemoglobin is at goal; no indication for NORAH at this time  -Mineral Bone Disease - continue phos binders    -Left femoral intertrochanteric fracture -s/p surgical fixation

## 2023-10-12 NOTE — PROGRESS NOTE ADULT - SUBJECTIVE AND OBJECTIVE BOX
[facility] Division of Hospital Medicine    Chief Complaint:      INTERVAL HISTORY:  Overnight, no acute events.     Patient seen and examined at bedside this morning. Reports some pain, controlled with medications. Patient denies chest pain, SOB, abd pain, N/V, fever, chills, dysuria or any other complaints.    MEDICATIONS  (STANDING):  acetaminophen     Tablet .. 975 milliGRAM(s) Oral every 8 hours  atorvastatin 40 milliGRAM(s) Oral at bedtime  calcium acetate 667 milliGRAM(s) Oral three times a day with meals  chlorhexidine 2% Cloths 1 Application(s) Topical daily  citalopram 20 milliGRAM(s) Oral daily  enoxaparin Injectable 40 milliGRAM(s) SubCutaneous every 24 hours  hydrALAZINE 50 milliGRAM(s) Oral three times a day  levothyroxine 25 MICROGram(s) Oral daily  melatonin 3 milliGRAM(s) Oral at bedtime  metoprolol succinate  milliGRAM(s) Oral daily  mirtazapine 7.5 milliGRAM(s) Oral at bedtime  mupirocin 2% Nasal 1 Application(s) Both Nostrils two times a day  pantoprazole    Tablet 40 milliGRAM(s) Oral before breakfast  senna 2 Tablet(s) Oral at bedtime    MEDICATIONS  (PRN):  aluminum hydroxide/magnesium hydroxide/simethicone Suspension 30 milliLiter(s) Oral every 4 hours PRN Dyspepsia  hydrALAZINE Injectable 10 milliGRAM(s) IV Push every 8 hours PRN SBP>170  HYDROmorphone   Tablet 2 milliGRAM(s) Oral every 4 hours PRN Severe Pain (7 - 10)  oxyCODONE    IR 5 milliGRAM(s) Oral every 4 hours PRN Mild Pain (1 - 3)  oxyCODONE    IR 10 milliGRAM(s) Oral every 4 hours PRN Moderate Pain (4 - 6)  polyethylene glycol 3350 17 Gram(s) Oral daily PRN Constipation        I&O's Summary    11 Oct 2023 07:01  -  12 Oct 2023 07:00  --------------------------------------------------------  IN: 1520 mL / OUT: 1500 mL / NET: 20 mL        PHYSICAL EXAM:  Vital Signs Last 24 Hrs  T(C): 37.1 (12 Oct 2023 05:00), Max: 37.1 (12 Oct 2023 05:00)  T(F): 98.7 (12 Oct 2023 05:00), Max: 98.7 (12 Oct 2023 05:00)  HR: 86 (12 Oct 2023 05:00) (61 - 86)  BP: 140/77 (12 Oct 2023 05:00) (121/65 - 168/78)  BP(mean): --  RR: 18 (12 Oct 2023 05:00) (18 - 19)  SpO2: 92% (12 Oct 2023 05:00) (92% - 96%)    Parameters below as of 12 Oct 2023 05:00  Patient On (Oxygen Delivery Method): nasal cannula  O2 Flow (L/min): 2        CONSTITUTIONAL: No apparent distress, appears comfortable  HEENT: Normocephalic, Atraumatic. Trachea midline.  RESPIRATORY: lungs are clear to auscultation bilaterally, no crackles, rhonchi, wheezes  CARDIOVASCULAR: Regular rate and rhythm, normal S1 and S2, no murmur/rub/gallop; No lower extremity edema; Peripheral pulses are 2+ bilaterally  ABDOMEN: Soft, non-distended, nontender to palpation, +BS  MUSCLOSKELETAL: moving all 4 extremities spontaneously  PSYCH: thoughts linear, affect appropriate  NEUROLOGY: Alert, Oriented x3, CN 2-12 grossly intact  SKIN: No rashes    LABS:                        10.5   6.77  )-----------( 157      ( 12 Oct 2023 06:01 )             34.8     10-12    137  |  99  |  31.8<H>  ----------------------------<  98  4.4   |  27.0  |  3.79<H>    Ca    7.7<L>      12 Oct 2023 06:01            Urinalysis Basic - ( 12 Oct 2023 06:01 )    Color: x / Appearance: x / SG: x / pH: x  Gluc: 98 mg/dL / Ketone: x  / Bili: x / Urobili: x   Blood: x / Protein: x / Nitrite: x   Leuk Esterase: x / RBC: x / WBC x   Sq Epi: x / Non Sq Epi: x / Bacteria: x        CAPILLARY BLOOD GLUCOSE            RADIOLOGY & ADDITIONAL TESTS:  Results Reviewed:   Imaging Personally Reviewed:  Electrocardiogram Personally Reviewed:

## 2023-10-12 NOTE — PROGRESS NOTE ADULT - ASSESSMENT
78yoM w/ PMHx of significant for COPD on 2L Home O2, HTN, Hypothyroidism, HTN, CAD s/p PCI, ESRD on HD MWF, L. peroneal DVT (2/2023) no longer on Eliquis who presents to [facility] ED s/p mechanical fall. Found to have Nondisplaced L. intertrochanteric femur fx, with plans for Orthopedic intervention in AM, and plan for HD today to continue MWF schedule.     #L. Femoral nondisplaced intertrochanteric fracture   s/p Mechanical fall   s/p anterograde nailing of femur 10/10/23  - Analgesics  - Monitor Hgb  - bowel regimen  - Incentive Spirometry  - PT evaluation: VIVIANA     #ESRD on HD   Nephrology following, continues on MWF HD, no missed sessions recently   - HD as per schedule  - Continue to monitor renal function, renally dose medications as needed     #COPD on Home Oxygen   - Not in exacerbation, currently saturating well on 2L NC (baseline)   - Supplemental O2  - Nebs PRN / Pulmicort     #CAD  - s/p PCI in past, no longer on DAPT   - continues on ASA, Lipitor, Toprol     #HTN Urgency - resolved   - elevated BP up to 199/101, started hydralazine PRN for SBP > 170, continued home meds, Toprol, Hydralazine 50 TID, Will likely improve s/p HD.     #Hypothyroidism   - Levothyroxine 25 mcg     #Anxiety/Depression   - Mirtazapine, citalopram     #H/O L. peroneal DVT   - Noted in documentation 2/2023, was on Eliquis, stopped by outpatient cardiologist per patient when clot no longer seen on imaging     DVT Prophylaxis  - LMWH    Code status: DNR/DNI/ trial NIV     Dispo:  pending placement

## 2023-10-12 NOTE — OCCUPATIONAL THERAPY INITIAL EVALUATION ADULT - LEVEL OF INDEPENDENCE: STAND/SIT, REHAB EVAL
Pt calling to cancel procedure scheduled on 12/30    Pt states something cane up on her work schedule     Pt would liked to reschedule in January      moderate assist (50% patients effort)

## 2023-10-12 NOTE — OCCUPATIONAL THERAPY INITIAL EVALUATION ADULT - MANUAL MUSCLE TESTING RESULTS, REHAB EVAL
Bilat UE: 4/5, Shoulders not tested due to decreased ROM./no strength deficits were identified Bilat UE: 4/5, Deltoids not tested due to decreased ROM./no strength deficits were identified

## 2023-10-12 NOTE — OCCUPATIONAL THERAPY INITIAL EVALUATION ADULT - PERTINENT HX OF CURRENT PROBLEM, REHAB EVAL
As per MD note: 78 year old male with PMH of depression, HLD, HTN, CAD s/p stents, COPD, DVT, BPH, ESRD on HD and hx of suicidal ideation where patient reports that he attempted to cut his AV fistula with a knife, s/p AV fistula ligation, now with L chest tunneled dialysis catheter, presents with a fall. Found to have left femoral intertrochanteric fracture. Nephrology is consulted for resumption of hemodialysis in an ESRD patient. Of note, patient complained of a recent UTI, on an unknown oral antibiotics, and inability to empty his bladder.

## 2023-10-12 NOTE — PROGRESS NOTE ADULT - SUBJECTIVE AND OBJECTIVE BOX
[facility] DIVISION OF KIDNEY DISEASES AND HYPERTENSION -- HEMODIALYSIS NOTE  --------------------------------------------------------------------------------  Chief Complaint: ESRD/Ongoing hemodialysis requirement    24 hour events/subjective:  no acute event noted  pt seen and examined; feels well    PAST HISTORY  --------------------------------------------------------------------------------  No significant changes to PMH, PSH, FHx, SHx, unless otherwise noted    ALLERGIES & MEDICATIONS  --------------------------------------------------------------------------------  Allergies    No Known Allergies    Intolerances      Standing Inpatient Medications  acetaminophen     Tablet .. 975 milliGRAM(s) Oral every 8 hours  atorvastatin 40 milliGRAM(s) Oral at bedtime  calcium acetate 667 milliGRAM(s) Oral three times a day with meals  chlorhexidine 2% Cloths 1 Application(s) Topical daily  citalopram 20 milliGRAM(s) Oral daily  enoxaparin Injectable 40 milliGRAM(s) SubCutaneous every 24 hours  hydrALAZINE 50 milliGRAM(s) Oral three times a day  levothyroxine 25 MICROGram(s) Oral daily  melatonin 3 milliGRAM(s) Oral at bedtime  metoprolol succinate  milliGRAM(s) Oral daily  mirtazapine 7.5 milliGRAM(s) Oral at bedtime  mupirocin 2% Nasal 1 Application(s) Both Nostrils two times a day  pantoprazole    Tablet 40 milliGRAM(s) Oral before breakfast  senna 2 Tablet(s) Oral at bedtime    PRN Inpatient Medications  aluminum hydroxide/magnesium hydroxide/simethicone Suspension 30 milliLiter(s) Oral every 4 hours PRN  hydrALAZINE Injectable 10 milliGRAM(s) IV Push every 8 hours PRN  HYDROmorphone   Tablet 2 milliGRAM(s) Oral every 4 hours PRN  oxyCODONE    IR 5 milliGRAM(s) Oral every 4 hours PRN  oxyCODONE    IR 10 milliGRAM(s) Oral every 4 hours PRN  polyethylene glycol 3350 17 Gram(s) Oral daily PRN      REVIEW OF SYSTEMS  --------------------------------------------------------------------------------  Gen: No weight changes, fatigue, fevers/chills, weakness  Skin: No rashes  Head/Eyes/Ears/Mouth: No headache  Respiratory: No dyspnea, cough,  CV: No chest pain, orthopnea  GI: No abdominal pain, diarrhea, constipation, nausea, vomiting,  MSK: No joint pain  Neuro: No dizziness/lightheadedness, weakness  Heme: No bleeding  Psych: No significant nervousness, anxiety, stress, depression    All other systems were reviewed and are negative, except as noted.    VITALS/PHYSICAL EXAM  --------------------------------------------------------------------------------  T(C): 36.8 (10-12-23 @ 21:18), Max: 37.1 (10-12-23 @ 05:00)  HR: 63 (10-12-23 @ 21:18) (63 - 86)  BP: 126/64 (10-12-23 @ 21:18) (120/67 - 178/82)  RR: 18 (10-12-23 @ 21:18) (18 - 18)  SpO2: 96% (10-12-23 @ 21:18) (92% - 97%)  Wt(kg): --        10-11-23 @ 07:01  -  10-12-23 @ 07:00  --------------------------------------------------------  IN: 1520 mL / OUT: 1500 mL / NET: 20 mL    10-12-23 @ 07:01  -  10-12-23 @ 21:52  --------------------------------------------------------  IN: 0 mL / OUT: 100 mL / NET: -100 mL      Physical Exam:  	Gen: NAD,  	HEENT: supple neck,  	Pulm: CTA B/L  	CV: RRR, S1S2; no rub  	Abd: +BS, soft, nontender  	UE: Warm,  	LE: Warm, + edema  	Neuro: No focal deficits  	Psych: Normal affect and mood  	Skin: Warm  	[facility]    LABS/STUDIES  --------------------------------------------------------------------------------              10.5   6.77  >-----------<  157      [10-12-23 @ 06:01]              34.8     137  |  99  |  31.8  ----------------------------<  98      [10-12-23 @ 06:01]  4.4   |  27.0  |  3.79        Ca     7.7     [10-12-23 @ 06:01]            PTH -- (Ca 8.3)      [10-09-23 @ 10:52]   350

## 2023-10-12 NOTE — PROGRESS NOTE ADULT - SUBJECTIVE AND OBJECTIVE BOX
ORTHOPEDIC POST-OP PROGRESS NOTE:    Name: JULIO WYNNE    MR #: [id number]    Procedure: Intramedulary nail of Left hip      DOS: 10/10      Pt comfortable without complaints, pain controlled. Denies CP, SOB, N/V, numbness/tingling               Vital Signs Last 24 Hrs  T(C): 37.1 (10-12-23 @ 05:00), Max: 37.1 (10-12-23 @ 05:00)  T(F): 98.7 (10-12-23 @ 05:00), Max: 98.7 (10-12-23 @ 05:00)  HR: 86 (10-12-23 @ 05:00) (86 - 86)  BP: 140/77 (10-12-23 @ 05:00) (140/77 - 140/77)  BP(mean): --  RR: 18 (10-12-23 @ 05:00) (18 - 18)  SpO2: 92% (10-12-23 @ 05:00) (92% - 92%)      General Exam:    General: Pt Alert and oriented, NAD, controlled pain.    Dressings C/D/I. No bleeding. No erythema.    Skin: No erythema. No wound dehisence.    Pulses: 2+ dorsalis pedis pulse. Cap refill < 2 sec.    Sensation: Grossly intact to light touch without deficit.    Motor: EHL 4/5 left leg        A/P: 78y Male POD# 2 s/p Left hip IM nail      - Stable  - Pain Control  - DVT ppx as prescribed lov 40 daily  - PT eval  - Weight bearing status: WBAT

## 2023-10-13 LAB
ANION GAP SERPL CALC-SCNC: 13 MMOL/L — SIGNIFICANT CHANGE UP (ref 5–17)
BUN SERPL-MCNC: 53.8 MG/DL — HIGH (ref 8–20)
CALCIUM SERPL-MCNC: 8.1 MG/DL — LOW (ref 8.4–10.5)
CHLORIDE SERPL-SCNC: 99 MMOL/L — SIGNIFICANT CHANGE UP (ref 96–108)
CO2 SERPL-SCNC: 25 MMOL/L — SIGNIFICANT CHANGE UP (ref 22–29)
CREAT SERPL-MCNC: 5.23 MG/DL — HIGH (ref 0.5–1.3)
EGFR: 10 ML/MIN/1.73M2 — LOW
GLUCOSE SERPL-MCNC: 82 MG/DL — SIGNIFICANT CHANGE UP (ref 70–99)
HCT VFR BLD CALC: 31.4 % — LOW (ref 39–50)
HGB BLD-MCNC: 9.7 G/DL — LOW (ref 13–17)
MCHC RBC-ENTMCNC: 30.9 GM/DL — LOW (ref 32–36)
MCHC RBC-ENTMCNC: 31 PG — SIGNIFICANT CHANGE UP (ref 27–34)
MCV RBC AUTO: 100.3 FL — HIGH (ref 80–100)
PLATELET # BLD AUTO: 186 K/UL — SIGNIFICANT CHANGE UP (ref 150–400)
POTASSIUM SERPL-MCNC: 4.9 MMOL/L — SIGNIFICANT CHANGE UP (ref 3.5–5.3)
POTASSIUM SERPL-SCNC: 4.9 MMOL/L — SIGNIFICANT CHANGE UP (ref 3.5–5.3)
RBC # BLD: 3.13 M/UL — LOW (ref 4.2–5.8)
RBC # FLD: 17.1 % — HIGH (ref 10.3–14.5)
SODIUM SERPL-SCNC: 137 MMOL/L — SIGNIFICANT CHANGE UP (ref 135–145)
WBC # BLD: 5.32 K/UL — SIGNIFICANT CHANGE UP (ref 3.8–10.5)
WBC # FLD AUTO: 5.32 K/UL — SIGNIFICANT CHANGE UP (ref 3.8–10.5)

## 2023-10-13 PROCEDURE — 90935 HEMODIALYSIS ONE EVALUATION: CPT

## 2023-10-13 PROCEDURE — 99232 SBSQ HOSP IP/OBS MODERATE 35: CPT

## 2023-10-13 RX ADMIN — ATORVASTATIN CALCIUM 40 MILLIGRAM(S): 80 TABLET, FILM COATED ORAL at 21:23

## 2023-10-13 RX ADMIN — MUPIROCIN 1 APPLICATION(S): 20 OINTMENT TOPICAL at 18:49

## 2023-10-13 RX ADMIN — ENOXAPARIN SODIUM 40 MILLIGRAM(S): 100 INJECTION SUBCUTANEOUS at 05:15

## 2023-10-13 RX ADMIN — CITALOPRAM 20 MILLIGRAM(S): 10 TABLET, FILM COATED ORAL at 12:29

## 2023-10-13 RX ADMIN — Medication 975 MILLIGRAM(S): at 22:23

## 2023-10-13 RX ADMIN — Medication 50 MILLIGRAM(S): at 14:58

## 2023-10-13 RX ADMIN — Medication 50 MILLIGRAM(S): at 21:22

## 2023-10-13 RX ADMIN — Medication 667 MILLIGRAM(S): at 12:29

## 2023-10-13 RX ADMIN — SENNA PLUS 2 TABLET(S): 8.6 TABLET ORAL at 21:23

## 2023-10-13 RX ADMIN — Medication 3 MILLIGRAM(S): at 21:23

## 2023-10-13 RX ADMIN — Medication 50 MILLIGRAM(S): at 05:15

## 2023-10-13 RX ADMIN — Medication 975 MILLIGRAM(S): at 06:15

## 2023-10-13 RX ADMIN — Medication 25 MICROGRAM(S): at 05:15

## 2023-10-13 RX ADMIN — Medication 667 MILLIGRAM(S): at 18:48

## 2023-10-13 RX ADMIN — CHLORHEXIDINE GLUCONATE 1 APPLICATION(S): 213 SOLUTION TOPICAL at 12:28

## 2023-10-13 RX ADMIN — Medication 975 MILLIGRAM(S): at 05:15

## 2023-10-13 RX ADMIN — MIRTAZAPINE 7.5 MILLIGRAM(S): 45 TABLET, ORALLY DISINTEGRATING ORAL at 21:22

## 2023-10-13 RX ADMIN — Medication 975 MILLIGRAM(S): at 21:23

## 2023-10-13 RX ADMIN — Medication 975 MILLIGRAM(S): at 14:57

## 2023-10-13 RX ADMIN — MUPIROCIN 1 APPLICATION(S): 20 OINTMENT TOPICAL at 05:20

## 2023-10-13 RX ADMIN — Medication 667 MILLIGRAM(S): at 10:48

## 2023-10-13 RX ADMIN — PANTOPRAZOLE SODIUM 40 MILLIGRAM(S): 20 TABLET, DELAYED RELEASE ORAL at 05:15

## 2023-10-13 RX ADMIN — Medication 100 MILLIGRAM(S): at 05:15

## 2023-10-13 NOTE — PROGRESS NOTE ADULT - SUBJECTIVE AND OBJECTIVE BOX
[facility] Division of Hospital Medicine    Chief Complaint:      INTERVAL HISTORY:  Overnight, no acute events.     Patient seen and examined at bedside this morning, getting HD. Patient denies chest pain, SOB, abd pain, N/V, fever, chills, dysuria or any other complaints. Reports pain with movement of leg. After speaking to his son, more accepting of VIVIANA.     MEDICATIONS  (STANDING):  acetaminophen     Tablet .. 975 milliGRAM(s) Oral every 8 hours  atorvastatin 40 milliGRAM(s) Oral at bedtime  calcium acetate 667 milliGRAM(s) Oral three times a day with meals  chlorhexidine 2% Cloths 1 Application(s) Topical daily  citalopram 20 milliGRAM(s) Oral daily  enoxaparin Injectable 40 milliGRAM(s) SubCutaneous every 24 hours  hydrALAZINE 50 milliGRAM(s) Oral three times a day  levothyroxine 25 MICROGram(s) Oral daily  melatonin 3 milliGRAM(s) Oral at bedtime  metoprolol succinate  milliGRAM(s) Oral daily  mirtazapine 7.5 milliGRAM(s) Oral at bedtime  mupirocin 2% Nasal 1 Application(s) Both Nostrils two times a day  pantoprazole    Tablet 40 milliGRAM(s) Oral before breakfast  senna 2 Tablet(s) Oral at bedtime    MEDICATIONS  (PRN):  aluminum hydroxide/magnesium hydroxide/simethicone Suspension 30 milliLiter(s) Oral every 4 hours PRN Dyspepsia  hydrALAZINE Injectable 10 milliGRAM(s) IV Push every 8 hours PRN SBP>170  HYDROmorphone   Tablet 2 milliGRAM(s) Oral every 4 hours PRN Severe Pain (7 - 10)  oxyCODONE    IR 5 milliGRAM(s) Oral every 4 hours PRN Mild Pain (1 - 3)  oxyCODONE    IR 10 milliGRAM(s) Oral every 4 hours PRN Moderate Pain (4 - 6)  polyethylene glycol 3350 17 Gram(s) Oral daily PRN Constipation        I&O's Summary    12 Oct 2023 07:01  -  13 Oct 2023 07:00  --------------------------------------------------------  IN: 0 mL / OUT: 300 mL / NET: -300 mL    13 Oct 2023 07:01  -  13 Oct 2023 13:01  --------------------------------------------------------  IN: 0 mL / OUT: 1000 mL / NET: -1000 mL        PHYSICAL EXAM:  Vital Signs Last 24 Hrs  T(C): 36.8 (13 Oct 2023 11:51), Max: 36.8 (12 Oct 2023 21:18)  T(F): 98.3 (13 Oct 2023 11:51), Max: 98.3 (12 Oct 2023 21:18)  HR: 76 (13 Oct 2023 11:51) (63 - 78)  BP: 121/66 (13 Oct 2023 11:51) (121/66 - 178/82)  BP(mean): --  RR: 18 (13 Oct 2023 11:51) (18 - 18)  SpO2: 98% (13 Oct 2023 11:51) (95% - 98%)    Parameters below as of 13 Oct 2023 11:51  Patient On (Oxygen Delivery Method): nasal cannula  O2 Flow (L/min): 2      CONSTITUTIONAL: No apparent distress, appears comfortable  HEENT: Normocephalic, Atraumatic. Trachea midline.  RESPIRATORY: lungs are clear to auscultation bilaterally, no crackles, rhonchi, wheezes  CARDIOVASCULAR: Regular rate and rhythm, normal S1 and S2, no murmur/rub/gallop; No lower extremity edema; Peripheral pulses are 2+ bilaterally  ABDOMEN: Soft, non-distended, nontender to palpation, +BS  MUSCLOSKELETAL: minimal movement of LE 2/2 pain  PSYCH: thoughts linear, affect appropriate  NEUROLOGY: Alert, Oriented x3, CN 2-12 grossly intact  SKIN: No rashes  LABS:                        9.7    5.32  )-----------( 186      ( 13 Oct 2023 06:48 )             31.4     10-13    137  |  99  |  53.8<H>  ----------------------------<  82  4.9   |  25.0  |  5.23<H>    Ca    8.1<L>      13 Oct 2023 06:48            Urinalysis Basic - ( 13 Oct 2023 06:48 )    Color: x / Appearance: x / SG: x / pH: x  Gluc: 82 mg/dL / Ketone: x  / Bili: x / Urobili: x   Blood: x / Protein: x / Nitrite: x   Leuk Esterase: x / RBC: x / WBC x   Sq Epi: x / Non Sq Epi: x / Bacteria: x        CAPILLARY BLOOD GLUCOSE            RADIOLOGY & ADDITIONAL TESTS:  Results Reviewed:   Imaging Personally Reviewed:  Electrocardiogram Personally Reviewed:

## 2023-10-13 NOTE — PROGRESS NOTE ADULT - ASSESSMENT
78 year old male with PMH of depression, HLD, HTN, CAD s/p stents, COPD, DVT, BPH, ESRD on HD and hx of suicidal ideation where patient reports that he attempted to cut his AV fistula with a knife, s/p AV fistula ligation, now with L chest tunneled dialysis catheter, presents with a fall. Found to have left femoral intertrochanteric fracture. Nephrology is consulted for resumption of hemodialysis in an ESRD patient. Of note, patient complained of a recent UTI, on an unknown oral antibiotics, and inability to empty his bladder. No suprapubic tenderness on palpation.     ESRD on HD  -Access: L chest tunneled dialysis catheter  -Outpatient dialysis unit is at [place]  -Outpatient dialysis days are Mondays/ Wednesdays/ Fridays  HD today    HTN; BP stable, continue home meds    -Volume Status - UF as tolerated during hemodialysis   -Anemia - hemoglobin is at goal; no indication for NORAH at this time  -Mineral Bone Disease - continue phos binders    -Left femoral intertrochanteric fracture -s/p surgical fixation

## 2023-10-13 NOTE — PROGRESS NOTE ADULT - ASSESSMENT
78yoM w/ PMHx of significant for COPD on 2L Home O2, HTN, Hypothyroidism, HTN, CAD s/p PCI, ESRD on HD MWF, L. peroneal DVT (2/2023) no longer on Eliquis who presents to [facility] ED s/p mechanical fall. Found to have Nondisplaced L. intertrochanteric femur fx, with plans for Orthopedic intervention in AM, and plan for HD today to continue MWF schedule.     #L. Femoral nondisplaced intertrochanteric fracture   s/p Mechanical fall   s/p anterograde nailing of femur 10/10/23  - Analgesics  - Monitor Hgb  - bowel regimen  - Incentive Spirometry  - PT evaluation: VIVIANA     #ESRD on HD   Nephrology following, continues on MWF HD, no missed sessions recently   - HD as per schedule  - Continue to monitor renal function, renally dose medications as needed     #COPD on Home Oxygen   - Not in exacerbation, currently saturating well on 2L NC (baseline)   - Supplemental O2  - Nebs PRN / Pulmicort     #CAD  - s/p PCI in past, no longer on DAPT   - continues on ASA, Lipitor, Toprol     #HTN Urgency - resolved   - elevated BP up to 199/101, started hydralazine PRN for SBP > 170, continued home meds, Toprol, Hydralazine 50 TID, Will likely improve s/p HD.     #Hypothyroidism   - Levothyroxine 25 mcg     #Anxiety/Depression   - Mirtazapine, citalopram     #H/O L. peroneal DVT   - Noted in documentation 2/2023, was on Eliquis, stopped by outpatient cardiologist per patient when clot no longer seen on imaging     DVT Prophylaxis  - LMWH    Code status: DNR/DNI/ trial NIV     Dispo:  pending placement; need HD capable facility    78yoM w/ PMHx of significant for COPD on 2L Home O2, HTN, Hypothyroidism, HTN, CAD s/p PCI, ESRD on HD MWF, L. peroneal DVT (2/2023) no longer on Eliquis who presents to [facility] ED s/p mechanical fall. Found to have Nondisplaced L. intertrochanteric femur fx, s/p IMN of the left hip.     #L. Femoral nondisplaced intertrochanteric fracture   s/p Mechanical fall   s/p anterograde nailing of femur 10/10/23  - Analgesics  - Monitor Hgb  - bowel regimen  - Incentive Spirometry  - PT evaluation: VIVIANA     #anemia  - Hgb 11.5 -> 9.7  - likely post op, no evidence of active blood  - continue to monitor     #ESRD on HD   Nephrology following, continues on MWF HD, no missed sessions recently   - HD as per schedule  - Continue to monitor renal function, renally dose medications as needed     #COPD on Home Oxygen   - Not in exacerbation, currently saturating well on 2L NC (baseline)   - Supplemental O2  - Nebs PRN / Pulmicort     #CAD  - s/p PCI in past, no longer on DAPT   - continues on ASA, Lipitor, Toprol     #HTN Urgency - resolved   - elevated BP up to 199/101, started hydralazine PRN for SBP > 170, continued home meds, Toprol, Hydralazine 50 TID, Will likely improve s/p HD.     #Hypothyroidism   - Levothyroxine 25 mcg     #Anxiety/Depression   - Mirtazapine, citalopram     #H/O L. peroneal DVT   - Noted in documentation 2/2023, was on Eliquis, stopped by outpatient cardiologist per patient when clot no longer seen on imaging     DVT Prophylaxis  - LMWH    Code status: DNR/DNI/ trial NIV     Dispo:  pending placement; need HD capable facility    78yoM w/ PMHx of significant for COPD on 2L Home O2, HTN, Hypothyroidism, HTN, CAD s/p PCI, ESRD on HD MWF, L. peroneal DVT (2/2023) no longer on Eliquis who presents to [facility] ED s/p mechanical fall. Found to have Nondisplaced L. intertrochanteric femur fx, s/p IMN of the left hip.     #L. Femoral nondisplaced intertrochanteric fracture   s/p Mechanical fall   s/p anterograde nailing of femur 10/10/23  - Analgesics  - Monitor Hgb  - bowel regimen  - Incentive Spirometry  - PT evaluation: VIVIANA     #anemia  - Hgb 11.5 -> 9.7  - likely post op, no evidence of active blood  - continue to monitor     #ESRD on HD   Nephrology following, continues on MWF HD, no missed sessions recently   - HD as per schedule  - Continue to monitor renal function, renally dose medications as needed     #COPD on Home Oxygen   - Not in exacerbation, currently saturating well on 2L NC (baseline)   - Supplemental O2  - Nebs PRN / Pulmicort     #CAD  - s/p PCI in past, no longer on DAPT   - continues on ASA, Lipitor, Toprol     #HTN Urgency - resolved   - elevated BP up to 199/101, started hydralazine PRN for SBP > 170, continued home meds, Toprol, Hydralazine 50 TID, Will likely improve s/p HD.     #Hypothyroidism   - Levothyroxine 25 mcg     #Anxiety/Depression   - Mirtazapine, citalopram     #H/O L. peroneal DVT   - Noted in documentation 2/2023, was on Eliquis, stopped by outpatient cardiologist per patient when clot no longer seen on imaging     DVT Prophylaxis  - LMWH    Dispo:  pending placement; need HD capable facility

## 2023-10-13 NOTE — PROGRESS NOTE ADULT - SUBJECTIVE AND OBJECTIVE BOX
Patient was seen and evaluated on dialysis.   Patient is tolerating the procedure well.   T(C): 36.8 (10-13-23 @ 11:51), Max: 36.8 (10-12-23 @ 21:18)  HR: 76 (10-13-23 @ 11:51) (63 - 76)  BP: 121/66 (10-13-23 @ 11:51) (121/66 - 145/67)  Continue dialysis:   Dialyzer:  revaclear 300 QB:  400 QD: 500ml.,  Goal UF 1 kg over 3 Hours      [facility] DIVISION OF KIDNEY DISEASES AND HYPERTENSION -- HEMODIALYSIS NOTE  --------------------------------------------------------------------------------  Chief Complaint: ESRD/Ongoing hemodialysis requirement    24 hour events/subjective:        PAST HISTORY  --------------------------------------------------------------------------------  No significant changes to PMH, PSH, FHx, SHx, unless otherwise noted    ALLERGIES & MEDICATIONS  --------------------------------------------------------------------------------  Allergies    No Known Allergies    Intolerances      Standing Inpatient Medications  acetaminophen     Tablet .. 975 milliGRAM(s) Oral every 8 hours  atorvastatin 40 milliGRAM(s) Oral at bedtime  calcium acetate 667 milliGRAM(s) Oral three times a day with meals  chlorhexidine 2% Cloths 1 Application(s) Topical daily  citalopram 20 milliGRAM(s) Oral daily  enoxaparin Injectable 40 milliGRAM(s) SubCutaneous every 24 hours  hydrALAZINE 50 milliGRAM(s) Oral three times a day  levothyroxine 25 MICROGram(s) Oral daily  melatonin 3 milliGRAM(s) Oral at bedtime  metoprolol succinate  milliGRAM(s) Oral daily  mirtazapine 7.5 milliGRAM(s) Oral at bedtime  mupirocin 2% Nasal 1 Application(s) Both Nostrils two times a day  pantoprazole    Tablet 40 milliGRAM(s) Oral before breakfast  senna 2 Tablet(s) Oral at bedtime    PRN Inpatient Medications  aluminum hydroxide/magnesium hydroxide/simethicone Suspension 30 milliLiter(s) Oral every 4 hours PRN  hydrALAZINE Injectable 10 milliGRAM(s) IV Push every 8 hours PRN  HYDROmorphone   Tablet 2 milliGRAM(s) Oral every 4 hours PRN  oxyCODONE    IR 5 milliGRAM(s) Oral every 4 hours PRN  oxyCODONE    IR 10 milliGRAM(s) Oral every 4 hours PRN  polyethylene glycol 3350 17 Gram(s) Oral daily PRN      REVIEW OF SYSTEMS  --------------------------------------------------------------------------------  Gen: No weight changes, fatigue, fevers/chills, weakness  Skin: No rashes  Head/Eyes/Ears/Mouth: No headache  Respiratory: No dyspnea, cough,  CV: No chest pain, orthopnea  GI: No abdominal pain, diarrhea, constipation, nausea, vomiting,  MSK: No joint pain  Neuro: No dizziness/lightheadedness, weakness  Heme: No bleeding  Psych: No significant nervousness, anxiety, stress, depression    All other systems were reviewed and are negative, except as noted.    VITALS/PHYSICAL EXAM  --------------------------------------------------------------------------------  T(C): 36.8 (10-13-23 @ 11:51), Max: 36.8 (10-12-23 @ 21:18)  HR: 76 (10-13-23 @ 11:51) (63 - 76)  BP: 121/66 (10-13-23 @ 11:51) (121/66 - 145/67)  RR: 18 (10-13-23 @ 11:51) (18 - 18)  SpO2: 98% (10-13-23 @ 11:51) (96% - 98%)  Wt(kg): --        10-12-23 @ 07:01  -  10-13-23 @ 07:00  --------------------------------------------------------  IN: 0 mL / OUT: 300 mL / NET: -300 mL    10-13-23 @ 07:01  -  10-13-23 @ 15:30  --------------------------------------------------------  IN: 0 mL / OUT: 1000 mL / NET: -1000 mL      Physical Exam:  	Gen: NAD, well-appearing  	HEENT: PERRL, supple neck,  	Pulm: CTA B/L  	CV: RRR, S1S2; no rub  	Abd: +BS, soft, nontender  	UE: Warm, intact strength; no asterixis  	LE: Warm, + edema  	Neuro: No focal deficits  	Psych: Normal affect and mood  	Skin: Warm, without rashes  	Vascular access:    LABS/STUDIES  --------------------------------------------------------------------------------              9.7    5.32  >-----------<  186      [10-13-23 @ 06:48]              31.4     137  |  99  |  53.8  ----------------------------<  82      [10-13-23 @ 06:48]  4.9   |  25.0  |  5.23        Ca     8.1     [10-13-23 @ 06:48]            PTH -- (Ca 8.3)      [10-09-23 @ 10:52]   350     [facility] DIVISION OF KIDNEY DISEASES AND HYPERTENSION -- HEMODIALYSIS NOTE  --------------------------------------------------------------------------------  Chief Complaint: ESRD/Ongoing hemodialysis requirement    24 hour events/subjective:  s/p HD yesterday      PAST HISTORY  --------------------------------------------------------------------------------  No significant changes to PMH, PSH, FHx, SHx, unless otherwise noted    ALLERGIES & MEDICATIONS  --------------------------------------------------------------------------------  Allergies  No Known Allergies      Standing Inpatient Medications  acetaminophen     Tablet .. 975 milliGRAM(s) Oral every 8 hours  atorvastatin 40 milliGRAM(s) Oral at bedtime  calcium acetate 667 milliGRAM(s) Oral three times a day with meals  chlorhexidine 2% Cloths 1 Application(s) Topical daily  citalopram 20 milliGRAM(s) Oral daily  enoxaparin Injectable 40 milliGRAM(s) SubCutaneous every 24 hours  hydrALAZINE 50 milliGRAM(s) Oral three times a day  levothyroxine 25 MICROGram(s) Oral daily  melatonin 3 milliGRAM(s) Oral at bedtime  metoprolol succinate  milliGRAM(s) Oral daily  mirtazapine 7.5 milliGRAM(s) Oral at bedtime  mupirocin 2% Nasal 1 Application(s) Both Nostrils two times a day  pantoprazole    Tablet 40 milliGRAM(s) Oral before breakfast  senna 2 Tablet(s) Oral at bedtime    PRN Inpatient Medications  aluminum hydroxide/magnesium hydroxide/simethicone Suspension 30 milliLiter(s) Oral every 4 hours PRN  hydrALAZINE Injectable 10 milliGRAM(s) IV Push every 8 hours PRN  HYDROmorphone   Tablet 2 milliGRAM(s) Oral every 4 hours PRN  oxyCODONE    IR 5 milliGRAM(s) Oral every 4 hours PRN  oxyCODONE    IR 10 milliGRAM(s) Oral every 4 hours PRN  polyethylene glycol 3350 17 Gram(s) Oral daily PRN      REVIEW OF SYSTEMS  --------------------------------------------------------------------------------  Gen: No weight changes, fatigue, fevers/chills, weakness  Skin: No rashes  Head/Eyes/Ears/Mouth: No headache  Respiratory: No dyspnea, cough,  CV: No chest pain, orthopnea  GI: No abdominal pain, diarrhea, constipation, nausea, vomiting,  MSK: No joint pain  Neuro: No dizziness/lightheadedness, weakness  Heme: No bleeding  Psych: No significant nervousness, anxiety, stress, depression    All other systems were reviewed and are negative, except as noted.    VITALS/PHYSICAL EXAM  --------------------------------------------------------------------------------  T(C): 36.8 (10-13-23 @ 11:51), Max: 36.8 (10-12-23 @ 21:18)  HR: 76 (10-13-23 @ 11:51) (63 - 76)  BP: 121/66 (10-13-23 @ 11:51) (121/66 - 145/67)  RR: 18 (10-13-23 @ 11:51) (18 - 18)  SpO2: 98% (10-13-23 @ 11:51) (96% - 98%)  Wt(kg): --        10-12-23 @ 07:01  -  10-13-23 @ 07:00  --------------------------------------------------------  IN: 0 mL / OUT: 300 mL / NET: -300 mL    10-13-23 @ 07:01  -  10-13-23 @ 15:30  --------------------------------------------------------  IN: 0 mL / OUT: 1000 mL / NET: -1000 mL      Physical Exam:  	Gen: NAD  	HEENT:  supple neck,  	Pulm: CTA B/L  	CV: RRR, S1S2; no rub  	Abd: +BS, soft, nontender  	UE: Warm,  	LE: Warm, no edema  	Neuro: No focal deficits  	Psych: Normal affect and mood  	Skin: Warm,  	Vascular access: [facility]      LABS/STUDIES  --------------------------------------------------------------------------------              9.7    5.32  >-----------<  186      [10-13-23 @ 06:48]              31.4     137  |  99  |  53.8  ----------------------------<  82      [10-13-23 @ 06:48]  4.9   |  25.0  |  5.23        Ca     8.1     [10-13-23 @ 06:48]            PTH -- (Ca 8.3)      [10-09-23 @ 10:52]   350

## 2023-10-14 LAB
ANION GAP SERPL CALC-SCNC: 11 MMOL/L — SIGNIFICANT CHANGE UP (ref 5–17)
APTT BLD: 125.6 SEC — CRITICAL HIGH (ref 24.5–35.6)
BUN SERPL-MCNC: 38.7 MG/DL — HIGH (ref 8–20)
CALCIUM SERPL-MCNC: 8.5 MG/DL — SIGNIFICANT CHANGE UP (ref 8.4–10.5)
CHLORIDE SERPL-SCNC: 101 MMOL/L — SIGNIFICANT CHANGE UP (ref 96–108)
CO2 SERPL-SCNC: 28 MMOL/L — SIGNIFICANT CHANGE UP (ref 22–29)
CREAT SERPL-MCNC: 4.32 MG/DL — HIGH (ref 0.5–1.3)
EGFR: 13 ML/MIN/1.73M2 — LOW
GLUCOSE SERPL-MCNC: 77 MG/DL — SIGNIFICANT CHANGE UP (ref 70–99)
HAV IGM SER-ACNC: SIGNIFICANT CHANGE UP
HBV CORE IGM SER-ACNC: SIGNIFICANT CHANGE UP
HBV SURFACE AG SER-ACNC: SIGNIFICANT CHANGE UP
HCT VFR BLD CALC: 33.1 % — LOW (ref 39–50)
HCT VFR BLD CALC: 33.3 % — LOW (ref 39–50)
HCV AB S/CO SERPL IA: 0.09 S/CO — SIGNIFICANT CHANGE UP (ref 0–0.99)
HCV AB SERPL-IMP: SIGNIFICANT CHANGE UP
HGB BLD-MCNC: 10 G/DL — LOW (ref 13–17)
HGB BLD-MCNC: 9.9 G/DL — LOW (ref 13–17)
MCHC RBC-ENTMCNC: 29.7 GM/DL — LOW (ref 32–36)
MCHC RBC-ENTMCNC: 30.2 GM/DL — LOW (ref 32–36)
MCHC RBC-ENTMCNC: 30.7 PG — SIGNIFICANT CHANGE UP (ref 27–34)
MCHC RBC-ENTMCNC: 31.3 PG — SIGNIFICANT CHANGE UP (ref 27–34)
MCV RBC AUTO: 103.1 FL — HIGH (ref 80–100)
MCV RBC AUTO: 103.4 FL — HIGH (ref 80–100)
PLATELET # BLD AUTO: 198 K/UL — SIGNIFICANT CHANGE UP (ref 150–400)
PLATELET # BLD AUTO: 218 K/UL — SIGNIFICANT CHANGE UP (ref 150–400)
POTASSIUM SERPL-MCNC: 4.5 MMOL/L — SIGNIFICANT CHANGE UP (ref 3.5–5.3)
POTASSIUM SERPL-SCNC: 4.5 MMOL/L — SIGNIFICANT CHANGE UP (ref 3.5–5.3)
RBC # BLD: 3.2 M/UL — LOW (ref 4.2–5.8)
RBC # BLD: 3.23 M/UL — LOW (ref 4.2–5.8)
RBC # FLD: 16.8 % — HIGH (ref 10.3–14.5)
RBC # FLD: 16.9 % — HIGH (ref 10.3–14.5)
SODIUM SERPL-SCNC: 140 MMOL/L — SIGNIFICANT CHANGE UP (ref 135–145)
WBC # BLD: 4.87 K/UL — SIGNIFICANT CHANGE UP (ref 3.8–10.5)
WBC # BLD: 4.93 K/UL — SIGNIFICANT CHANGE UP (ref 3.8–10.5)
WBC # FLD AUTO: 4.87 K/UL — SIGNIFICANT CHANGE UP (ref 3.8–10.5)
WBC # FLD AUTO: 4.93 K/UL — SIGNIFICANT CHANGE UP (ref 3.8–10.5)

## 2023-10-14 PROCEDURE — 99233 SBSQ HOSP IP/OBS HIGH 50: CPT

## 2023-10-14 PROCEDURE — 93971 EXTREMITY STUDY: CPT | Mod: 26,RT

## 2023-10-14 RX ORDER — HEPARIN SODIUM 5000 [USP'U]/ML
2000 INJECTION INTRAVENOUS; SUBCUTANEOUS EVERY 6 HOURS
Refills: 0 | Status: DISCONTINUED | OUTPATIENT
Start: 2023-10-14 | End: 2023-10-16

## 2023-10-14 RX ORDER — HEPARIN SODIUM 5000 [USP'U]/ML
4500 INJECTION INTRAVENOUS; SUBCUTANEOUS EVERY 6 HOURS
Refills: 0 | Status: DISCONTINUED | OUTPATIENT
Start: 2023-10-14 | End: 2023-10-16

## 2023-10-14 RX ORDER — HEPARIN SODIUM 5000 [USP'U]/ML
INJECTION INTRAVENOUS; SUBCUTANEOUS
Qty: 25000 | Refills: 0 | Status: DISCONTINUED | OUTPATIENT
Start: 2023-10-14 | End: 2023-10-16

## 2023-10-14 RX ADMIN — Medication 50 MILLIGRAM(S): at 13:54

## 2023-10-14 RX ADMIN — Medication 975 MILLIGRAM(S): at 13:52

## 2023-10-14 RX ADMIN — MUPIROCIN 1 APPLICATION(S): 20 OINTMENT TOPICAL at 05:03

## 2023-10-14 RX ADMIN — Medication 50 MILLIGRAM(S): at 21:56

## 2023-10-14 RX ADMIN — ATORVASTATIN CALCIUM 40 MILLIGRAM(S): 80 TABLET, FILM COATED ORAL at 21:57

## 2023-10-14 RX ADMIN — Medication 100 MILLIGRAM(S): at 05:02

## 2023-10-14 RX ADMIN — MIRTAZAPINE 7.5 MILLIGRAM(S): 45 TABLET, ORALLY DISINTEGRATING ORAL at 21:57

## 2023-10-14 RX ADMIN — PANTOPRAZOLE SODIUM 40 MILLIGRAM(S): 20 TABLET, DELAYED RELEASE ORAL at 05:02

## 2023-10-14 RX ADMIN — Medication 50 MILLIGRAM(S): at 05:01

## 2023-10-14 RX ADMIN — CHLORHEXIDINE GLUCONATE 1 APPLICATION(S): 213 SOLUTION TOPICAL at 11:30

## 2023-10-14 RX ADMIN — Medication 3 MILLIGRAM(S): at 21:57

## 2023-10-14 RX ADMIN — HEPARIN SODIUM 1100 UNIT(S)/HR: 5000 INJECTION INTRAVENOUS; SUBCUTANEOUS at 14:04

## 2023-10-14 RX ADMIN — Medication 975 MILLIGRAM(S): at 15:12

## 2023-10-14 RX ADMIN — Medication 975 MILLIGRAM(S): at 06:01

## 2023-10-14 RX ADMIN — Medication 667 MILLIGRAM(S): at 17:25

## 2023-10-14 RX ADMIN — Medication 667 MILLIGRAM(S): at 13:53

## 2023-10-14 RX ADMIN — Medication 25 MICROGRAM(S): at 05:02

## 2023-10-14 RX ADMIN — HEPARIN SODIUM 1000 UNIT(S)/HR: 5000 INJECTION INTRAVENOUS; SUBCUTANEOUS at 21:33

## 2023-10-14 RX ADMIN — Medication 667 MILLIGRAM(S): at 08:34

## 2023-10-14 RX ADMIN — SENNA PLUS 2 TABLET(S): 8.6 TABLET ORAL at 21:57

## 2023-10-14 RX ADMIN — Medication 975 MILLIGRAM(S): at 05:01

## 2023-10-14 RX ADMIN — ENOXAPARIN SODIUM 40 MILLIGRAM(S): 100 INJECTION SUBCUTANEOUS at 05:01

## 2023-10-14 RX ADMIN — CITALOPRAM 20 MILLIGRAM(S): 10 TABLET, FILM COATED ORAL at 11:26

## 2023-10-14 RX ADMIN — HEPARIN SODIUM 1100 UNIT(S)/HR: 5000 INJECTION INTRAVENOUS; SUBCUTANEOUS at 19:03

## 2023-10-14 RX ADMIN — Medication 975 MILLIGRAM(S): at 22:57

## 2023-10-14 RX ADMIN — Medication 975 MILLIGRAM(S): at 21:57

## 2023-10-14 RX ADMIN — MUPIROCIN 1 APPLICATION(S): 20 OINTMENT TOPICAL at 17:25

## 2023-10-14 NOTE — PROGRESS NOTE ADULT - ASSESSMENT
78yoM w/ COPD on 2L home O2, HTN, Hypothyroidism, HTN, CAD s/p PCI, ESRD on HD MWF, LLE DVT (2/23) no longer on AC admitted with left intertrochanteric femoral fracture.  L. peroneal DVT (2/2023) no longer on Eliquis who presents to [facility] ED s/p mechanical fall. Found to have Nondisplaced L. intertrochanteric femur fx, s/p IMN of the left hip. Of note today due to swelling of RUE doppler ordered which has resulted as pos for DVT    L. Femoral nondisplaced intertrochanteric fracture   s/p Mechanical fall   Analgesia PRN  Incentive Spirometry  PT evaluation: VIVIANA     Anemia  Drop in H/H indicative of intraprocedural loss   Stable    DVT   h/o LLE DVT, now with RUE DVT.   Was on ? Lovenox     #ESRD on HD   Nephrology following, continues on MWF HD, no missed sessions recently   - HD as per schedule  - Continue to monitor renal function, renally dose medications as needed     #COPD on Home Oxygen   - Not in exacerbation, currently saturating well on 2L NC (baseline)   - Supplemental O2  - Nebs PRN / Pulmicort     #CAD  - s/p PCI in past, no longer on DAPT   - continues on ASA, Lipitor, Toprol     #HTN Urgency - resolved   - elevated BP up to 199/101, started hydralazine PRN for SBP > 170, continued home meds, Toprol, Hydralazine 50 TID, Will likely improve s/p HD.     #Hypothyroidism   - Levothyroxine 25 mcg     #Anxiety/Depression   - Mirtazapine, citalopram     #H/O L. peroneal DVT   - Noted in documentation 2/2023, was on Eliquis, stopped by outpatient cardiologist per patient when clot no longer seen on imaging     DVT Prophylaxis  - LMWH    Dispo:  pending placement; need HD capable facility    78yoM w/ COPD on 2L home O2, HTN, Hypothyroidism, HTN, CAD s/p PCI, ESRD on HD MWF, LLE DVT (2/23) no longer on AC admitted with left intertrochanteric femoral fracture.  L. peroneal DVT (2/2023) no longer on Eliquis who presents to [facility] ED s/p mechanical fall. Found to have Nondisplaced L. intertrochanteric femur fx, s/p IMN of the left hip. Of note today due to swelling of RUE doppler ordered which has resulted as pos for DVT    L. Femoral nondisplaced intertrochanteric fracture   s/p Mechanical fall   Analgesia PRN  Incentive Spirometry  PT evaluation: VIVIANA     Anemia  Drop in H/H indicative of intraprocedural loss   Stable    Acute DVT   h/o LLE DVT, now with RUE DVT.   Was on ? Lovenox (is ESRD)  Eliquis had been stopped as f/u LE Doppler neg per chart  Start Heparin ggt, no initial  bolus as has been on Lovenox    ESRD on HD   Nephrology following, continues on MWF HD,  HD as per schedule    COPD on Home Oxygen   Not in exacerbation, currently saturating well on 2L NC (baseline)   Supplemental O2  Nebs PRN / Pulmicort     CAD  s/p PCI in past, no longer on DAPT   continue ASA, Lipitor, Toprol     HTN Urgency  resolved     Hypothyroidism   Levothyroxine 25 mcg     Anxiety/Depression   Mirtazapine, citalopram       Dispo:  VIVIANA with HD

## 2023-10-14 NOTE — PROGRESS NOTE ADULT - SUBJECTIVE AND OBJECTIVE BOX
[facility] Division of Hospital Medicine    Chief Complaint:  Hip fracture     INTERVAL HISTORY:  Pt examined lying in bed  On exam had RUE swelling. Dopplers ordered ; notable for SVT as well as DVT  Is agreeable to VIVIANA today  Reports Hip pain. Denies CP, SOB, abd pain, N/V    MEDICATIONS  (STANDING):  acetaminophen     Tablet .. 975 milliGRAM(s) Oral every 8 hours  atorvastatin 40 milliGRAM(s) Oral at bedtime  calcium acetate 667 milliGRAM(s) Oral three times a day with meals  chlorhexidine 2% Cloths 1 Application(s) Topical daily  citalopram 20 milliGRAM(s) Oral daily  enoxaparin Injectable 40 milliGRAM(s) SubCutaneous every 24 hours  hydrALAZINE 50 milliGRAM(s) Oral three times a day  levothyroxine 25 MICROGram(s) Oral daily  melatonin 3 milliGRAM(s) Oral at bedtime  metoprolol succinate  milliGRAM(s) Oral daily  mirtazapine 7.5 milliGRAM(s) Oral at bedtime  mupirocin 2% Nasal 1 Application(s) Both Nostrils two times a day  pantoprazole    Tablet 40 milliGRAM(s) Oral before breakfast  senna 2 Tablet(s) Oral at bedtime    MEDICATIONS  (PRN):  aluminum hydroxide/magnesium hydroxide/simethicone Suspension 30 milliLiter(s) Oral every 4 hours PRN Dyspepsia  hydrALAZINE Injectable 10 milliGRAM(s) IV Push every 8 hours PRN SBP>170  HYDROmorphone   Tablet 2 milliGRAM(s) Oral every 4 hours PRN Severe Pain (7 - 10)  oxyCODONE    IR 5 milliGRAM(s) Oral every 4 hours PRN Mild Pain (1 - 3)  oxyCODONE    IR 10 milliGRAM(s) Oral every 4 hours PRN Moderate Pain (4 - 6)  polyethylene glycol 3350 17 Gram(s) Oral daily PRN Constipation        I&O's Summary    12 Oct 2023 07:01  -  13 Oct 2023 07:00  --------------------------------------------------------  IN: 0 mL / OUT: 300 mL / NET: -300 mL    13 Oct 2023 07:01  -  13 Oct 2023 13:01  --------------------------------------------------------  IN: 0 mL / OUT: 1000 mL / NET: -1000 mL        PHYSICAL EXAM:  Vital Signs Last 24 Hrs  T(C): 36.8 (13 Oct 2023 11:51), Max: 36.8 (12 Oct 2023 21:18)  T(F): 98.3 (13 Oct 2023 11:51), Max: 98.3 (12 Oct 2023 21:18)  HR: 76 (13 Oct 2023 11:51) (63 - 78)  BP: 121/66 (13 Oct 2023 11:51) (121/66 - 178/82)  BP(mean): --  RR: 18 (13 Oct 2023 11:51) (18 - 18)  SpO2: 98% (13 Oct 2023 11:51) (95% - 98%)    Parameters below as of 13 Oct 2023 11:51  Patient On (Oxygen Delivery Method): nasal cannula  O2 Flow (L/min): 2      CONSTITUTIONAL: No apparent distress, appears comfortable  HEENT: Normocephalic, Atraumatic. Trachea midline.  RESPIRATORY: lungs are clear to auscultation bilaterally, no crackles, rhonchi, wheezes  CARDIOVASCULAR: Regular rate and rhythm, normal S1 and S2, no murmur/rub/gallop; No lower extremity edema; Peripheral pulses are 2+ bilaterally  ABDOMEN: Soft, non-distended, nontender to palpation, +BS  MUSCLOSKELETAL: minimal movement of LE 2/2 pain, RUE non pitting edema   PSYCH: Pleasant, normal afffect appropriate  NEUROLOGY: Alert, Oriented x3, CN 2-12 grossly intact  SKIN: No rashes    LABS:                        9.7    5.32  )-----------( 186      ( 13 Oct 2023 06:48 )             31.4     10-13    137  |  99  |  53.8<H>  ----------------------------<  82  4.9   |  25.0  |  5.23<H>    Ca    8.1<L>      13 Oct 2023 06:48            Urinalysis Basic - ( 13 Oct 2023 06:48 )    Color: x / Appearance: x / SG: x / pH: x  Gluc: 82 mg/dL / Ketone: x  / Bili: x / Urobili: x   Blood: x / Protein: x / Nitrite: x   Leuk Esterase: x / RBC: x / WBC x   Sq Epi: x / Non Sq Epi: x / Bacteria: x        CAPILLARY BLOOD GLUCOSE            RADIOLOGY & ADDITIONAL TESTS:  Results Reviewed:   Imaging Personally Reviewed:  Electrocardiogram Personally Reviewed:

## 2023-10-14 NOTE — PROVIDER CONTACT NOTE (CRITICAL VALUE NOTIFICATION) - NS PROVIDER READ BACK TO LAB
yes
yes
Protopic Counseling: Patient may experience a mild burning sensation during topical application. Protopic is not approved in children less than 2 years of age. There have been case reports of hematologic and skin malignancies in patients using topical calcineurin inhibitors although causality is questionable.

## 2023-10-15 LAB
ANION GAP SERPL CALC-SCNC: 12 MMOL/L — SIGNIFICANT CHANGE UP (ref 5–17)
APTT BLD: 78.9 SEC — HIGH (ref 24.5–35.6)
APTT BLD: 92.2 SEC — HIGH (ref 24.5–35.6)
BUN SERPL-MCNC: 52.8 MG/DL — HIGH (ref 8–20)
CALCIUM SERPL-MCNC: 8.3 MG/DL — LOW (ref 8.4–10.5)
CHLORIDE SERPL-SCNC: 100 MMOL/L — SIGNIFICANT CHANGE UP (ref 96–108)
CO2 SERPL-SCNC: 26 MMOL/L — SIGNIFICANT CHANGE UP (ref 22–29)
CREAT SERPL-MCNC: 5.75 MG/DL — HIGH (ref 0.5–1.3)
EGFR: 9 ML/MIN/1.73M2 — LOW
GLUCOSE SERPL-MCNC: 79 MG/DL — SIGNIFICANT CHANGE UP (ref 70–99)
HCT VFR BLD CALC: 33.9 % — LOW (ref 39–50)
HGB BLD-MCNC: 10.1 G/DL — LOW (ref 13–17)
MCHC RBC-ENTMCNC: 29.8 GM/DL — LOW (ref 32–36)
MCHC RBC-ENTMCNC: 30.6 PG — SIGNIFICANT CHANGE UP (ref 27–34)
MCV RBC AUTO: 102.7 FL — HIGH (ref 80–100)
PLATELET # BLD AUTO: 212 K/UL — SIGNIFICANT CHANGE UP (ref 150–400)
POTASSIUM SERPL-MCNC: 4.8 MMOL/L — SIGNIFICANT CHANGE UP (ref 3.5–5.3)
POTASSIUM SERPL-SCNC: 4.8 MMOL/L — SIGNIFICANT CHANGE UP (ref 3.5–5.3)
RBC # BLD: 3.3 M/UL — LOW (ref 4.2–5.8)
RBC # FLD: 16.6 % — HIGH (ref 10.3–14.5)
SODIUM SERPL-SCNC: 138 MMOL/L — SIGNIFICANT CHANGE UP (ref 135–145)
WBC # BLD: 4.09 K/UL — SIGNIFICANT CHANGE UP (ref 3.8–10.5)
WBC # FLD AUTO: 4.09 K/UL — SIGNIFICANT CHANGE UP (ref 3.8–10.5)

## 2023-10-15 PROCEDURE — 99233 SBSQ HOSP IP/OBS HIGH 50: CPT

## 2023-10-15 RX ADMIN — Medication 667 MILLIGRAM(S): at 07:57

## 2023-10-15 RX ADMIN — HEPARIN SODIUM 1000 UNIT(S)/HR: 5000 INJECTION INTRAVENOUS; SUBCUTANEOUS at 07:37

## 2023-10-15 RX ADMIN — PANTOPRAZOLE SODIUM 40 MILLIGRAM(S): 20 TABLET, DELAYED RELEASE ORAL at 05:03

## 2023-10-15 RX ADMIN — MUPIROCIN 1 APPLICATION(S): 20 OINTMENT TOPICAL at 05:02

## 2023-10-15 RX ADMIN — ATORVASTATIN CALCIUM 40 MILLIGRAM(S): 80 TABLET, FILM COATED ORAL at 21:02

## 2023-10-15 RX ADMIN — Medication 50 MILLIGRAM(S): at 21:02

## 2023-10-15 RX ADMIN — Medication 975 MILLIGRAM(S): at 05:02

## 2023-10-15 RX ADMIN — Medication 50 MILLIGRAM(S): at 05:02

## 2023-10-15 RX ADMIN — Medication 975 MILLIGRAM(S): at 21:02

## 2023-10-15 RX ADMIN — SENNA PLUS 2 TABLET(S): 8.6 TABLET ORAL at 21:03

## 2023-10-15 RX ADMIN — HEPARIN SODIUM 1000 UNIT(S)/HR: 5000 INJECTION INTRAVENOUS; SUBCUTANEOUS at 19:01

## 2023-10-15 RX ADMIN — Medication 975 MILLIGRAM(S): at 06:02

## 2023-10-15 RX ADMIN — Medication 667 MILLIGRAM(S): at 17:10

## 2023-10-15 RX ADMIN — HEPARIN SODIUM 1000 UNIT(S)/HR: 5000 INJECTION INTRAVENOUS; SUBCUTANEOUS at 12:20

## 2023-10-15 RX ADMIN — Medication 975 MILLIGRAM(S): at 22:02

## 2023-10-15 RX ADMIN — Medication 3 MILLIGRAM(S): at 21:03

## 2023-10-15 RX ADMIN — HEPARIN SODIUM 1000 UNIT(S)/HR: 5000 INJECTION INTRAVENOUS; SUBCUTANEOUS at 04:29

## 2023-10-15 RX ADMIN — Medication 25 MICROGRAM(S): at 05:02

## 2023-10-15 RX ADMIN — Medication 100 MILLIGRAM(S): at 05:02

## 2023-10-15 RX ADMIN — MIRTAZAPINE 7.5 MILLIGRAM(S): 45 TABLET, ORALLY DISINTEGRATING ORAL at 21:03

## 2023-10-15 RX ADMIN — CITALOPRAM 20 MILLIGRAM(S): 10 TABLET, FILM COATED ORAL at 11:00

## 2023-10-15 RX ADMIN — Medication 50 MILLIGRAM(S): at 13:03

## 2023-10-15 RX ADMIN — HEPARIN SODIUM 1000 UNIT(S)/HR: 5000 INJECTION INTRAVENOUS; SUBCUTANEOUS at 14:26

## 2023-10-15 RX ADMIN — Medication 667 MILLIGRAM(S): at 11:05

## 2023-10-15 RX ADMIN — Medication 975 MILLIGRAM(S): at 14:00

## 2023-10-15 RX ADMIN — Medication 975 MILLIGRAM(S): at 13:03

## 2023-10-15 RX ADMIN — CHLORHEXIDINE GLUCONATE 1 APPLICATION(S): 213 SOLUTION TOPICAL at 11:00

## 2023-10-15 NOTE — PROGRESS NOTE ADULT - SUBJECTIVE AND OBJECTIVE BOX
[facility] Division of Hospital Medicine    Chief Complaint:  Hip fracture     INTERVAL HISTORY:  Pt examined lying in bed  Reports feeling depressed. States he doesnt see the point in living   No active SI or plans  Reports Hip pain. Denies CP, SOB, abd pain, N/V    MEDICATIONS  (STANDING):  acetaminophen     Tablet .. 975 milliGRAM(s) Oral every 8 hours  atorvastatin 40 milliGRAM(s) Oral at bedtime  calcium acetate 667 milliGRAM(s) Oral three times a day with meals  chlorhexidine 2% Cloths 1 Application(s) Topical daily  citalopram 20 milliGRAM(s) Oral daily  enoxaparin Injectable 40 milliGRAM(s) SubCutaneous every 24 hours  hydrALAZINE 50 milliGRAM(s) Oral three times a day  levothyroxine 25 MICROGram(s) Oral daily  melatonin 3 milliGRAM(s) Oral at bedtime  metoprolol succinate  milliGRAM(s) Oral daily  mirtazapine 7.5 milliGRAM(s) Oral at bedtime  mupirocin 2% Nasal 1 Application(s) Both Nostrils two times a day  pantoprazole    Tablet 40 milliGRAM(s) Oral before breakfast  senna 2 Tablet(s) Oral at bedtime    MEDICATIONS  (PRN):  aluminum hydroxide/magnesium hydroxide/simethicone Suspension 30 milliLiter(s) Oral every 4 hours PRN Dyspepsia  hydrALAZINE Injectable 10 milliGRAM(s) IV Push every 8 hours PRN SBP>170  HYDROmorphone   Tablet 2 milliGRAM(s) Oral every 4 hours PRN Severe Pain (7 - 10)  oxyCODONE    IR 5 milliGRAM(s) Oral every 4 hours PRN Mild Pain (1 - 3)  oxyCODONE    IR 10 milliGRAM(s) Oral every 4 hours PRN Moderate Pain (4 - 6)  polyethylene glycol 3350 17 Gram(s) Oral daily PRN Constipation      Vital Signs Last 24 Hrs  Vital Signs Last 24 Hrs  T(C): 36.9 (15 Oct 2023 10:30), Max: 36.9 (15 Oct 2023 04:45)  T(F): 98.4 (15 Oct 2023 10:30), Max: 98.4 (15 Oct 2023 04:45)  HR: 69 (15 Oct 2023 10:30) (68 - 77)  BP: 142/74 (15 Oct 2023 10:30) (119/70 - 148/79)  BP(mean): --  RR: 18 (15 Oct 2023 10:30) (18 - 18)  SpO2: 94% (15 Oct 2023 10:30) (93% - 96%)    Parameters below as of 15 Oct 2023 10:30  Patient On (Oxygen Delivery Method): nasal cannula  O2 Flow (L/min): 2    CONSTITUTIONAL: No apparent distress, appears comfortable  HEENT: Normocephalic, Atraumatic. Trachea midline.  RESPIRATORY: lungs are clear to auscultation bilaterally, no crackles, rhonchi, wheezes  CARDIOVASCULAR: Regular rate and rhythm, normal S1 and S2, no murmur/rub/gallop; No lower extremity edema; Peripheral pulses are 2+ bilaterally  ABDOMEN: Soft, non-distended, nontender to palpation, +BS  MUSCLOSKELETAL: improveme in ROM LLE 2/2 pain, RUE non pitting edema   PSYCH: Depressed affect. Suicidal thoughts (passive), no active SI  NEUROLOGY: Alert, Oriented x3, CN 2-12 grossly intact  SKIN: No rashes    LABS:                        10.1   4.09  )-----------( 212      ( 15 Oct 2023 04:01 )             33.9   10-15    138  |  100  |  52.8<H>  ----------------------------<  79  4.8   |  26.0  |  5.75<H>    Ca    8.3<L>      15 Oct 2023 04:01            Urinalysis Basic - ( 13 Oct 2023 06:48 )    Color: x / Appearance: x / SG: x / pH: x  Gluc: 82 mg/dL / Ketone: x  / Bili: x / Urobili: x   Blood: x / Protein: x / Nitrite: x   Leuk Esterase: x / RBC: x / WBC x   Sq Epi: x / Non Sq Epi: x / Bacteria: x        CAPILLARY BLOOD GLUCOSE            RADIOLOGY & ADDITIONAL TESTS:  Results Reviewed:   Imaging Personally Reviewed:  Electrocardiogram Personally Reviewed:

## 2023-10-15 NOTE — PROGRESS NOTE ADULT - ASSESSMENT
78yoM w/ COPD on 2L home O2, HTN, Hypothyroidism, HTN, CAD s/p PCI, ESRD on HD MWF, LLE DVT (2/23) no longer on AC admitted with left intertrochanteric femoral fracture.  L. peroneal DVT (2/2023) no longer on Eliquis who presents to [facility] ED s/p mechanical fall. Found to have Nondisplaced L. intertrochanteric femur fx, s/p IMN of the left hip. Of note found to have a RUE DVT     L. Femoral nondisplaced intertrochanteric fracture   s/p Mechanical fall   Analgesia PRN  Incentive Spirometry  PT evaluation: VIVIANA     Anemia  Drop in H/H indicative of intraprocedural loss   Stable    Acute DVT   h/o LLE DVT, now with RUE DVT.   Was on Lovenox. dc'd as has ESRD. Despite AC ppx howeevr still formed clot.   Unclear if provoked by prior line or lab draw  Eliquis had been stopped as f/u LE Doppler neg per chart  Continue Heparin ggt  Will d/w Heme in am     ESRD on HD   Nephrology following, continues on MWF HD,  HD as per schedule    COPD on Home Oxygen   Not in exacerbation, currently saturating well on 2L NC (baseline)   Supplemental O2  Nebs PRN / Pulmicort     CAD  s/p PCI in past, no longer on DAPT   continue ASA, Lipitor, Toprol     HTN Urgency  resolved     Hypothyroidism   Levothyroxine 25 mcg     Anxiety/Depression   Mirtazapine, citalopram       Dispo:  VIVIANA with HD

## 2023-10-16 LAB
ANION GAP SERPL CALC-SCNC: 13 MMOL/L — SIGNIFICANT CHANGE UP (ref 5–17)
ANISOCYTOSIS BLD QL: SLIGHT — SIGNIFICANT CHANGE UP
APTT BLD: 71.5 SEC — HIGH (ref 24.5–35.6)
BASOPHILS # BLD AUTO: 0.27 K/UL — HIGH (ref 0–0.2)
BASOPHILS NFR BLD AUTO: 6.2 % — HIGH (ref 0–2)
BUN SERPL-MCNC: 62 MG/DL — HIGH (ref 8–20)
BURR CELLS BLD QL SMEAR: PRESENT — SIGNIFICANT CHANGE UP
CALCIUM SERPL-MCNC: 8.6 MG/DL — SIGNIFICANT CHANGE UP (ref 8.4–10.5)
CHLORIDE SERPL-SCNC: 99 MMOL/L — SIGNIFICANT CHANGE UP (ref 96–108)
CO2 SERPL-SCNC: 26 MMOL/L — SIGNIFICANT CHANGE UP (ref 22–29)
CREAT SERPL-MCNC: 6.7 MG/DL — HIGH (ref 0.5–1.3)
DACRYOCYTES BLD QL SMEAR: SLIGHT — SIGNIFICANT CHANGE UP
EGFR: 8 ML/MIN/1.73M2 — LOW
EOSINOPHIL # BLD AUTO: 0.46 K/UL — SIGNIFICANT CHANGE UP (ref 0–0.5)
EOSINOPHIL NFR BLD AUTO: 10.6 % — HIGH (ref 0–6)
GIANT PLATELETS BLD QL SMEAR: PRESENT — SIGNIFICANT CHANGE UP
GLUCOSE SERPL-MCNC: 73 MG/DL — SIGNIFICANT CHANGE UP (ref 70–99)
HCT VFR BLD CALC: 32.8 % — LOW (ref 39–50)
HGB BLD-MCNC: 9.7 G/DL — LOW (ref 13–17)
LYMPHOCYTES # BLD AUTO: 0.42 K/UL — LOW (ref 1–3.3)
LYMPHOCYTES # BLD AUTO: 9.7 % — LOW (ref 13–44)
MACROCYTES BLD QL: SLIGHT — SIGNIFICANT CHANGE UP
MANUAL SMEAR VERIFICATION: SIGNIFICANT CHANGE UP
MCHC RBC-ENTMCNC: 29.6 GM/DL — LOW (ref 32–36)
MCHC RBC-ENTMCNC: 30.3 PG — SIGNIFICANT CHANGE UP (ref 27–34)
MCV RBC AUTO: 102.5 FL — HIGH (ref 80–100)
MONOCYTES # BLD AUTO: 0.35 K/UL — SIGNIFICANT CHANGE UP (ref 0–0.9)
MONOCYTES NFR BLD AUTO: 8 % — SIGNIFICANT CHANGE UP (ref 2–14)
MYELOCYTES NFR BLD: 0.9 % — HIGH (ref 0–0)
NEUTROPHILS # BLD AUTO: 2.76 K/UL — SIGNIFICANT CHANGE UP (ref 1.8–7.4)
NEUTROPHILS NFR BLD AUTO: 63.7 % — SIGNIFICANT CHANGE UP (ref 43–77)
OVALOCYTES BLD QL SMEAR: SLIGHT — SIGNIFICANT CHANGE UP
PLAT MORPH BLD: NORMAL — SIGNIFICANT CHANGE UP
PLATELET # BLD AUTO: 223 K/UL — SIGNIFICANT CHANGE UP (ref 150–400)
POIKILOCYTOSIS BLD QL AUTO: SLIGHT — SIGNIFICANT CHANGE UP
POLYCHROMASIA BLD QL SMEAR: SLIGHT — SIGNIFICANT CHANGE UP
POTASSIUM SERPL-MCNC: 5.1 MMOL/L — SIGNIFICANT CHANGE UP (ref 3.5–5.3)
POTASSIUM SERPL-SCNC: 5.1 MMOL/L — SIGNIFICANT CHANGE UP (ref 3.5–5.3)
RBC # BLD: 3.2 M/UL — LOW (ref 4.2–5.8)
RBC # FLD: 16.2 % — HIGH (ref 10.3–14.5)
RBC BLD AUTO: ABNORMAL
SODIUM SERPL-SCNC: 138 MMOL/L — SIGNIFICANT CHANGE UP (ref 135–145)
VARIANT LYMPHS # BLD: 0.9 % — SIGNIFICANT CHANGE UP (ref 0–6)
WBC # BLD: 4.33 K/UL — SIGNIFICANT CHANGE UP (ref 3.8–10.5)
WBC # FLD AUTO: 4.33 K/UL — SIGNIFICANT CHANGE UP (ref 3.8–10.5)

## 2023-10-16 PROCEDURE — 99232 SBSQ HOSP IP/OBS MODERATE 35: CPT

## 2023-10-16 RX ORDER — APIXABAN 2.5 MG/1
5 TABLET, FILM COATED ORAL EVERY 12 HOURS
Refills: 0 | Status: DISCONTINUED | OUTPATIENT
Start: 2023-10-16 | End: 2023-10-19

## 2023-10-16 RX ADMIN — Medication 667 MILLIGRAM(S): at 17:13

## 2023-10-16 RX ADMIN — Medication 100 MILLIGRAM(S): at 05:26

## 2023-10-16 RX ADMIN — Medication 50 MILLIGRAM(S): at 05:25

## 2023-10-16 RX ADMIN — Medication 3 MILLIGRAM(S): at 21:05

## 2023-10-16 RX ADMIN — Medication 975 MILLIGRAM(S): at 22:00

## 2023-10-16 RX ADMIN — PANTOPRAZOLE SODIUM 40 MILLIGRAM(S): 20 TABLET, DELAYED RELEASE ORAL at 05:26

## 2023-10-16 RX ADMIN — ATORVASTATIN CALCIUM 40 MILLIGRAM(S): 80 TABLET, FILM COATED ORAL at 21:05

## 2023-10-16 RX ADMIN — Medication 667 MILLIGRAM(S): at 07:31

## 2023-10-16 RX ADMIN — CITALOPRAM 20 MILLIGRAM(S): 10 TABLET, FILM COATED ORAL at 12:32

## 2023-10-16 RX ADMIN — SENNA PLUS 2 TABLET(S): 8.6 TABLET ORAL at 21:05

## 2023-10-16 RX ADMIN — Medication 975 MILLIGRAM(S): at 12:32

## 2023-10-16 RX ADMIN — Medication 667 MILLIGRAM(S): at 12:32

## 2023-10-16 RX ADMIN — Medication 50 MILLIGRAM(S): at 12:32

## 2023-10-16 RX ADMIN — MIRTAZAPINE 7.5 MILLIGRAM(S): 45 TABLET, ORALLY DISINTEGRATING ORAL at 21:05

## 2023-10-16 RX ADMIN — Medication 50 MILLIGRAM(S): at 21:05

## 2023-10-16 RX ADMIN — APIXABAN 5 MILLIGRAM(S): 2.5 TABLET, FILM COATED ORAL at 17:13

## 2023-10-16 RX ADMIN — CHLORHEXIDINE GLUCONATE 1 APPLICATION(S): 213 SOLUTION TOPICAL at 12:32

## 2023-10-16 RX ADMIN — Medication 975 MILLIGRAM(S): at 21:05

## 2023-10-16 RX ADMIN — Medication 25 MICROGRAM(S): at 05:26

## 2023-10-16 RX ADMIN — Medication 975 MILLIGRAM(S): at 05:25

## 2023-10-16 RX ADMIN — HEPARIN SODIUM 1000 UNIT(S)/HR: 5000 INJECTION INTRAVENOUS; SUBCUTANEOUS at 06:47

## 2023-10-16 RX ADMIN — Medication 975 MILLIGRAM(S): at 06:25

## 2023-10-16 RX ADMIN — Medication 975 MILLIGRAM(S): at 13:35

## 2023-10-16 NOTE — PROGRESS NOTE ADULT - SUBJECTIVE AND OBJECTIVE BOX
[facility] Division of Hospital Medicine    Chief Complaint:  Hip fracture     INTERVAL HISTORY:  Pt examined lying in bed  HD this am   Denies CP, SOB, abd pain, N/V    MEDICATIONS  (STANDING):  acetaminophen     Tablet .. 975 milliGRAM(s) Oral every 8 hours  apixaban 5 milliGRAM(s) Oral every 12 hours  atorvastatin 40 milliGRAM(s) Oral at bedtime  calcium acetate 667 milliGRAM(s) Oral three times a day with meals  chlorhexidine 2% Cloths 1 Application(s) Topical daily  citalopram 20 milliGRAM(s) Oral daily  hydrALAZINE 50 milliGRAM(s) Oral three times a day  levothyroxine 25 MICROGram(s) Oral daily  melatonin 3 milliGRAM(s) Oral at bedtime  metoprolol succinate  milliGRAM(s) Oral daily  mirtazapine 7.5 milliGRAM(s) Oral at bedtime  pantoprazole    Tablet 40 milliGRAM(s) Oral before breakfast  senna 2 Tablet(s) Oral at bedtime    MEDICATIONS  (PRN):  aluminum hydroxide/magnesium hydroxide/simethicone Suspension 30 milliLiter(s) Oral every 4 hours PRN Dyspepsia  hydrALAZINE Injectable 10 milliGRAM(s) IV Push every 8 hours PRN SBP>170  HYDROmorphone   Tablet 2 milliGRAM(s) Oral every 4 hours PRN Severe Pain (7 - 10)  oxyCODONE    IR 5 milliGRAM(s) Oral every 4 hours PRN Mild Pain (1 - 3)  oxyCODONE    IR 10 milliGRAM(s) Oral every 4 hours PRN Moderate Pain (4 - 6)  polyethylene glycol 3350 17 Gram(s) Oral daily PRN Constipation      Vital Signs Last 24 Hrs  T(C): 36.8 (16 Oct 2023 12:20), Max: 37.3 (16 Oct 2023 08:21)  T(F): 98.3 (16 Oct 2023 12:20), Max: 99.2 (16 Oct 2023 08:21)  HR: 70 (16 Oct 2023 12:20) (66 - 80)  BP: 151/77 (16 Oct 2023 12:20) (121/70 - 155/84)  BP(mean): --  RR: 17 (16 Oct 2023 12:20) (17 - 18)  SpO2: 97% (16 Oct 2023 12:20) (95% - 100%)    Parameters below as of 16 Oct 2023 12:20  Patient On (Oxygen Delivery Method): nasal cannula      CONSTITUTIONAL: No apparent distress, appears comfortable  HEENT: Normocephalic, Atraumatic. Trachea midline.  RESPIRATORY: lungs are clear to auscultation bilaterally, no crackles, rhonchi, wheezes  CARDIOVASCULAR: Regular rate and rhythm, normal S1 and S2, no murmur/rub/gallop; No lower extremity edema; Peripheral pulses are 2+ bilaterally  ABDOMEN: Soft, non-distended, nontender to palpation, +BS  MUSCLOSKELETAL: improveme in ROM LLE 2/2 pain, RUE non pitting edema   PSYCH: Depressed affect. Suicidal thoughts (passive), no active SI  NEUROLOGY: Alert, Oriented x3, CN 2-12 grossly intact  SKIN: No rashes      LABS:                        9.7    4.33  )-----------( 223      ( 16 Oct 2023 04:44 )             32.8   10-16    138  |  99  |  62.0<H>  ----------------------------<  73  5.1   |  26.0  |  6.70<H>    Ca    8.6      16 Oct 2023 04:44          Urinalysis Basic - ( 13 Oct 2023 06:48 )    Color: x / Appearance: x / SG: x / pH: x  Gluc: 82 mg/dL / Ketone: x  / Bili: x / Urobili: x   Blood: x / Protein: x / Nitrite: x   Leuk Esterase: x / RBC: x / WBC x   Sq Epi: x / Non Sq Epi: x / Bacteria: x        RADIOLOGY & ADDITIONAL TESTS:

## 2023-10-16 NOTE — PROGRESS NOTE ADULT - ASSESSMENT
78yoM w/ COPD on 2L home O2, HTN, Hypothyroidism, HTN, CAD s/p PCI, ESRD on HD MWF, LLE DVT (2/23) no longer on AC admitted with left intertrochanteric femoral fracture.  L. peroneal DVT (2/2023) no longer on Eliquis who presents to [facility] ED s/p mechanical fall. Found to have Nondisplaced L. intertrochanteric femur fx, s/p IMN of the left hip. Of note found to have a RUE DVT     L. Femoral nondisplaced intertrochanteric fracture   s/p Mechanical fall   Analgesia PRN  Incentive Spirometry  PT evaluation: VIVIANA     Anemia  Drop in H/H indicative of intraprocedural loss   Stable    Acute DVT   h/o LLE DVT, now with RUE DVT.   Was on Lovenox. dc'd as has ESRD. Despite AC ppx howeevr still formed clot.   Unclear if provoked by prior line or lab draw  Eliquis had been stopped as f/u LE Doppler neg per chart  Continue Heparin ggt  Will d/w Heme in am     ESRD on HD   Nephrology following, continues on MWF HD,  HD as per schedule    COPD on Home Oxygen   Not in exacerbation, currently saturating well on 2L NC (baseline)   Supplemental O2  Nebs PRN / Pulmicort     CAD  s/p PCI in past, no longer on DAPT   continue ASA, Lipitor, Toprol     HTN Urgency  resolved     Hypothyroidism   Levothyroxine 25 mcg     Anxiety/Depression   Mirtazapine, citalopram       Dispo:  VIVIANA with HD  78yoM w/ COPD on 2L home O2, HTN, Hypothyroidism, HTN, CAD s/p PCI, ESRD on HD MWF, LLE DVT (2/23) no longer on AC admitted with left intertrochanteric femoral fracture.  L. peroneal DVT (2/2023) no longer on Eliquis who presents to [facility] ED s/p mechanical fall. Found to have Nondisplaced L. intertrochanteric femur fx, s/p IMN of the left hip. Of note found to have a RUE DVT     L. Femoral nondisplaced intertrochanteric fracture   s/p Mechanical fall   Analgesia PRN  Incentive Spirometry  PT evaluation: VIVIANA     Anemia  Drop in H/H indicative of intraprocedural loss   Stable    Acute DVT   h/o LLE DVT, now with RUE DVT.   Was on Lovenox. dc'd as has ESRD. Despite AC ppx however still formed clot.   Unclear if provoked by prior line or lab draw  Eliquis had been stopped as f/u LE Doppler neg per chart  Continue Heparin ggt  Will d/w Heme in am     ESRD on HD   Nephrology following, continues on MWF HD,  HD as per schedule    COPD on Home Oxygen   Not in exacerbation, currently saturating well on 2L NC (baseline)   Supplemental O2  Nebs PRN / Pulmicort     CAD  s/p PCI in past, no longer on DAPT   continue ASA, Lipitor, Toprol     HTN Urgency  resolved     Hypothyroidism   Levothyroxine 25 mcg     Anxiety/Depression   Mirtazapine, citalopram       Dispo:  VIVIANA with HD

## 2023-10-16 NOTE — PROGRESS NOTE ADULT - SUBJECTIVE AND OBJECTIVE BOX
[facility] Division of Hospital Medicine    Chief Complaint:  Hip fracture     INTERVAL HISTORY:  Pt examined lying in bed  s/p HD this am   No active SI or plans  Denies CP, SOB, abd pain, N/V    MEDICATIONS  (STANDING):  acetaminophen     Tablet .. 975 milliGRAM(s) Oral every 8 hours  atorvastatin 40 milliGRAM(s) Oral at bedtime  calcium acetate 667 milliGRAM(s) Oral three times a day with meals  chlorhexidine 2% Cloths 1 Application(s) Topical daily  citalopram 20 milliGRAM(s) Oral daily  enoxaparin Injectable 40 milliGRAM(s) SubCutaneous every 24 hours  hydrALAZINE 50 milliGRAM(s) Oral three times a day  levothyroxine 25 MICROGram(s) Oral daily  melatonin 3 milliGRAM(s) Oral at bedtime  metoprolol succinate  milliGRAM(s) Oral daily  mirtazapine 7.5 milliGRAM(s) Oral at bedtime  mupirocin 2% Nasal 1 Application(s) Both Nostrils two times a day  pantoprazole    Tablet 40 milliGRAM(s) Oral before breakfast  senna 2 Tablet(s) Oral at bedtime    MEDICATIONS  (PRN):  aluminum hydroxide/magnesium hydroxide/simethicone Suspension 30 milliLiter(s) Oral every 4 hours PRN Dyspepsia  hydrALAZINE Injectable 10 milliGRAM(s) IV Push every 8 hours PRN SBP>170  HYDROmorphone   Tablet 2 milliGRAM(s) Oral every 4 hours PRN Severe Pain (7 - 10)  oxyCODONE    IR 5 milliGRAM(s) Oral every 4 hours PRN Mild Pain (1 - 3)  oxyCODONE    IR 10 milliGRAM(s) Oral every 4 hours PRN Moderate Pain (4 - 6)  polyethylene glycol 3350 17 Gram(s) Oral daily PRN Constipation      Vital Signs Last 24 Hrs  T(C): 36.8 (16 Oct 2023 12:20), Max: 37.3 (16 Oct 2023 08:21)  T(F): 98.3 (16 Oct 2023 12:20), Max: 99.2 (16 Oct 2023 08:21)  HR: 70 (16 Oct 2023 12:20) (66 - 80)  BP: 151/77 (16 Oct 2023 12:20) (121/70 - 155/84)  BP(mean): --  RR: 17 (16 Oct 2023 12:20) (17 - 18)  SpO2: 97% (16 Oct 2023 12:20) (95% - 100%)    Parameters below as of 16 Oct 2023 12:20  Patient On (Oxygen Delivery Method): nasal cannula      CONSTITUTIONAL: No apparent distress, appears comfortable  HEENT: Normocephalic, Atraumatic. Trachea midline.  RESPIRATORY: lungs are clear to auscultation bilaterally, no crackles, rhonchi, wheezes  CARDIOVASCULAR: Regular rate and rhythm, normal S1 and S2, no murmur/rub/gallop; No lower extremity edema; Peripheral pulses are 2+ bilaterally  ABDOMEN: Soft, non-distended, nontender to palpation, +BS  MUSCLOSKELETAL: improveme in ROM LLE 2/2 pain, RUE non pitting edema   PSYCH: Depressed affect. Suicidal thoughts (passive), no active SI  NEUROLOGY: Alert, Oriented x3, CN 2-12 grossly intact  SKIN: No rashes      LABS:                        9.7    4.33  )-----------( 223      ( 16 Oct 2023 04:44 )             32.8   10-16    138  |  99  |  62.0<H>  ----------------------------<  73  5.1   |  26.0  |  6.70<H>    Ca    8.6      16 Oct 2023 04:44          Urinalysis Basic - ( 13 Oct 2023 06:48 )    Color: x / Appearance: x / SG: x / pH: x  Gluc: 82 mg/dL / Ketone: x  / Bili: x / Urobili: x   Blood: x / Protein: x / Nitrite: x   Leuk Esterase: x / RBC: x / WBC x   Sq Epi: x / Non Sq Epi: x / Bacteria: x        RADIOLOGY & ADDITIONAL TESTS:

## 2023-10-16 NOTE — PROGRESS NOTE ADULT - ASSESSMENT
78yoM w/ COPD on 2L home O2, HTN, Hypothyroidism, HTN, CAD s/p PCI, ESRD on HD MWF, LLE DVT (2/23) no longer on AC admitted with left intertrochanteric femoral fracture.  L. peroneal DVT (2/2023) no longer on Eliquis who presents to [facility] ED s/p mechanical fall. Found to have Nondisplaced L. intertrochanteric femur fx, s/p IMN of the left hip. Of note found to have a RUE DVT     L. Femoral nondisplaced intertrochanteric fracture   s/p Mechanical fall   Analgesia PRN  Incentive Spirometry  PT evaluation: VIVIANA     Anemia  Drop in H/H indicative of intraprocedural loss   Stable    Acute DVT   h/o LLE DVT, now with RUE DVT.   Eliquis had been stopped as f/u LE Doppler neg per chart  Started back on Eliquis 5 mg bid (x 7 days followed by 2.5 mg bid thereafter)  Suspect he had IV line at site however given prior h/o clot will likely need lifelong AC at this point    ESRD on HD   Nephrology following, continues on MWF HD,  HD as per schedule    COPD on Home Oxygen   Not in exacerbation, currently saturating well on 2L NC (baseline)   Supplemental O2  Nebs PRN / Pulmicort     CAD  s/p PCI in past, no longer on DAPT   continue ASA, Lipitor, Toprol     HTN Urgency  resolved     Hypothyroidism   Levothyroxine 25 mcg     Anxiety/Depression   Mirtazapine, citalopram       Dispo:  VIVIANA with HD

## 2023-10-17 LAB
ANION GAP SERPL CALC-SCNC: 9 MMOL/L — SIGNIFICANT CHANGE UP (ref 5–17)
ANION GAP SERPL CALC-SCNC: 9 MMOL/L — SIGNIFICANT CHANGE UP (ref 5–17)
APTT BLD: 42 SEC — HIGH (ref 24.5–35.6)
APTT BLD: 42 SEC — HIGH (ref 24.5–35.6)
BUN SERPL-MCNC: 36.8 MG/DL — HIGH (ref 8–20)
BUN SERPL-MCNC: 36.8 MG/DL — HIGH (ref 8–20)
CALCIUM SERPL-MCNC: 8.6 MG/DL — SIGNIFICANT CHANGE UP (ref 8.4–10.5)
CALCIUM SERPL-MCNC: 8.6 MG/DL — SIGNIFICANT CHANGE UP (ref 8.4–10.5)
CHLORIDE SERPL-SCNC: 100 MMOL/L — SIGNIFICANT CHANGE UP (ref 96–108)
CHLORIDE SERPL-SCNC: 100 MMOL/L — SIGNIFICANT CHANGE UP (ref 96–108)
CO2 SERPL-SCNC: 28 MMOL/L — SIGNIFICANT CHANGE UP (ref 22–29)
CO2 SERPL-SCNC: 28 MMOL/L — SIGNIFICANT CHANGE UP (ref 22–29)
CREAT SERPL-MCNC: 4.97 MG/DL — HIGH (ref 0.5–1.3)
CREAT SERPL-MCNC: 4.97 MG/DL — HIGH (ref 0.5–1.3)
EGFR: 11 ML/MIN/1.73M2 — LOW
EGFR: 11 ML/MIN/1.73M2 — LOW
GLUCOSE SERPL-MCNC: 79 MG/DL — SIGNIFICANT CHANGE UP (ref 70–99)
GLUCOSE SERPL-MCNC: 79 MG/DL — SIGNIFICANT CHANGE UP (ref 70–99)
HCT VFR BLD CALC: 33.8 % — LOW (ref 39–50)
HCT VFR BLD CALC: 33.8 % — LOW (ref 39–50)
HGB BLD-MCNC: 10.1 G/DL — LOW (ref 13–17)
HGB BLD-MCNC: 10.1 G/DL — LOW (ref 13–17)
MCHC RBC-ENTMCNC: 29.9 GM/DL — LOW (ref 32–36)
MCHC RBC-ENTMCNC: 29.9 GM/DL — LOW (ref 32–36)
MCHC RBC-ENTMCNC: 30.6 PG — SIGNIFICANT CHANGE UP (ref 27–34)
MCHC RBC-ENTMCNC: 30.6 PG — SIGNIFICANT CHANGE UP (ref 27–34)
MCV RBC AUTO: 102.4 FL — HIGH (ref 80–100)
MCV RBC AUTO: 102.4 FL — HIGH (ref 80–100)
PLATELET # BLD AUTO: 224 K/UL — SIGNIFICANT CHANGE UP (ref 150–400)
PLATELET # BLD AUTO: 224 K/UL — SIGNIFICANT CHANGE UP (ref 150–400)
POTASSIUM SERPL-MCNC: 4.5 MMOL/L — SIGNIFICANT CHANGE UP (ref 3.5–5.3)
POTASSIUM SERPL-MCNC: 4.5 MMOL/L — SIGNIFICANT CHANGE UP (ref 3.5–5.3)
POTASSIUM SERPL-SCNC: 4.5 MMOL/L — SIGNIFICANT CHANGE UP (ref 3.5–5.3)
POTASSIUM SERPL-SCNC: 4.5 MMOL/L — SIGNIFICANT CHANGE UP (ref 3.5–5.3)
RBC # BLD: 3.3 M/UL — LOW (ref 4.2–5.8)
RBC # BLD: 3.3 M/UL — LOW (ref 4.2–5.8)
RBC # FLD: 16.3 % — HIGH (ref 10.3–14.5)
RBC # FLD: 16.3 % — HIGH (ref 10.3–14.5)
SODIUM SERPL-SCNC: 137 MMOL/L — SIGNIFICANT CHANGE UP (ref 135–145)
SODIUM SERPL-SCNC: 137 MMOL/L — SIGNIFICANT CHANGE UP (ref 135–145)
WBC # BLD: 4.21 K/UL — SIGNIFICANT CHANGE UP (ref 3.8–10.5)
WBC # BLD: 4.21 K/UL — SIGNIFICANT CHANGE UP (ref 3.8–10.5)
WBC # FLD AUTO: 4.21 K/UL — SIGNIFICANT CHANGE UP (ref 3.8–10.5)
WBC # FLD AUTO: 4.21 K/UL — SIGNIFICANT CHANGE UP (ref 3.8–10.5)

## 2023-10-17 PROCEDURE — 99232 SBSQ HOSP IP/OBS MODERATE 35: CPT

## 2023-10-17 PROCEDURE — 99222 1ST HOSP IP/OBS MODERATE 55: CPT

## 2023-10-17 RX ADMIN — Medication 975 MILLIGRAM(S): at 21:41

## 2023-10-17 RX ADMIN — CHLORHEXIDINE GLUCONATE 1 APPLICATION(S): 213 SOLUTION TOPICAL at 11:38

## 2023-10-17 RX ADMIN — PANTOPRAZOLE SODIUM 40 MILLIGRAM(S): 20 TABLET, DELAYED RELEASE ORAL at 05:12

## 2023-10-17 RX ADMIN — ATORVASTATIN CALCIUM 40 MILLIGRAM(S): 80 TABLET, FILM COATED ORAL at 21:42

## 2023-10-17 RX ADMIN — Medication 100 MILLIGRAM(S): at 05:12

## 2023-10-17 RX ADMIN — Medication 50 MILLIGRAM(S): at 13:36

## 2023-10-17 RX ADMIN — Medication 667 MILLIGRAM(S): at 17:43

## 2023-10-17 RX ADMIN — APIXABAN 5 MILLIGRAM(S): 2.5 TABLET, FILM COATED ORAL at 05:12

## 2023-10-17 RX ADMIN — Medication 975 MILLIGRAM(S): at 06:00

## 2023-10-17 RX ADMIN — Medication 975 MILLIGRAM(S): at 22:00

## 2023-10-17 RX ADMIN — Medication 50 MILLIGRAM(S): at 05:12

## 2023-10-17 RX ADMIN — Medication 667 MILLIGRAM(S): at 08:22

## 2023-10-17 RX ADMIN — Medication 667 MILLIGRAM(S): at 11:37

## 2023-10-17 RX ADMIN — Medication 975 MILLIGRAM(S): at 13:36

## 2023-10-17 RX ADMIN — MIRTAZAPINE 7.5 MILLIGRAM(S): 45 TABLET, ORALLY DISINTEGRATING ORAL at 21:41

## 2023-10-17 RX ADMIN — CITALOPRAM 20 MILLIGRAM(S): 10 TABLET, FILM COATED ORAL at 11:37

## 2023-10-17 RX ADMIN — Medication 975 MILLIGRAM(S): at 05:12

## 2023-10-17 RX ADMIN — SENNA PLUS 2 TABLET(S): 8.6 TABLET ORAL at 21:42

## 2023-10-17 RX ADMIN — Medication 3 MILLIGRAM(S): at 21:41

## 2023-10-17 RX ADMIN — APIXABAN 5 MILLIGRAM(S): 2.5 TABLET, FILM COATED ORAL at 17:43

## 2023-10-17 RX ADMIN — Medication 975 MILLIGRAM(S): at 14:36

## 2023-10-17 RX ADMIN — Medication 50 MILLIGRAM(S): at 21:41

## 2023-10-17 RX ADMIN — Medication 25 MICROGRAM(S): at 05:13

## 2023-10-17 NOTE — PROGRESS NOTE ADULT - ASSESSMENT
78yoM w/ COPD on 2L home O2, HTN, Hypothyroidism, HTN, CAD s/p PCI, ESRD on HD MWF, LLE DVT (2/23) no longer on AC admitted with left intertrochanteric femoral fracture.  L. peroneal DVT (2/2023) no longer on Eliquis who presents to [facility] ED s/p mechanical fall. Found to have Nondisplaced L. intertrochanteric femur fx, s/p IMN of the left hip. Of note found to have a RUE DVT     L. Femoral nondisplaced intertrochanteric fracture   s/p Mechanical fall   Analgesia PRN  Incentive Spirometry  PT evaluation: VIVIANA     Anemia  Drop in H/H indicative of intraprocedural loss   Stable    Acute DVT   h/o LLE DVT, now with RUE DVT.   Reports he thinks there was an IV line previously near AC fossa  Given prior h/o clot will likely need lifelong AC at this point  Eliquis had been stopped as f/u LE Doppler neg per chart  Started back on Eliquis 5 mg bid (x 7 days followed by 2.5 mg bid thereafter)  Outpt heme eval     ESRD on HD   Nephrology following, continues on MWF HD,  HD as per schedule    COPD on Home Oxygen   Not in exacerbation, currently saturating well on 2L NC (baseline)   Supplemental O2  Nebs PRN / Pulmicort     CAD  s/p PCI in past, no longer on DAPT   continue ASA, Lipitor, Toprol     HTN Urgency  resolved     Hypothyroidism   Levothyroxine 25 mcg     Anxiety/Depression   Mirtazapine, citalopram   h/o suicidal attempt with self mutilation of AVF  With passive suicidal thoughts (not plan) psych consulted     Dispo:  VIVIANA with HD

## 2023-10-17 NOTE — BH CONSULTATION LIAISON ASSESSMENT NOTE - HPI (INCLUDE ILLNESS QUALITY, SEVERITY, DURATION, TIMING, CONTEXT, MODIFYING FACTORS, ASSOCIATED SIGNS AND SYMPTOMS)
Pt is 78 year old male, , domiciled in [place], [place] with son and daughter in law, retired (receives SSI), past medical history of ESRD (dialysis MWF), COPD (on home O2), CAD, HTN, Hypothyroidism and past psychiatric history of anxiety and depression, no current psychiatric OPP, no prior inpatient psychiatric hospitalizations, no history of SI/SA, history of NSSIB in December via cutting wrists with pocket knife, current medication regimen appears to be Celexa 20 mg qd and Remeron 7.5 mg qhs (self reports compliance), no significant legal or family history, substance history significant for tobacco use, admitted to [facility] due to left femoral fracture. Behavioral Health consulted for depression.     Patient seen and assessed at bedside. He presents as tired-appearing with poor eye contact. He reports low mood for the past month due to medical problems, stating "I'm tired of laying in bed all day." He states "sometimes, I just want to quit the fight." He denies suicidal intent or plan, states "I would never do it, I'm Evangelical, I don't want to go to hell." He reports poor appetite, anhedonia, poor sleep, and day time fatigue. He reports low mood exacerbated by recent fall and left hip fracture. He denies HI/AVH. No symptoms of kasie elicited. He denies prior suicide attempt, admits to non-suicidal self-injurious behavior via cutting of wrists during past hospitalization in December. He states that he couldn't breathe, and cut his wrists with a pocket knife to get medical attention. He refused to show wrists at this time, states that he did not require sutures.  Pt is 78 year old male, , domiciled in [place], [place] with son and daughter in law, retired (receives SSI), past medical history of ESRD (dialysis MWF), COPD (on home O2), CAD, HTN, Hypothyroidism and past psychiatric history of anxiety and depression, no current psychiatric OPP, no prior inpatient psychiatric hospitalizations, no history of SI/SA, history of NSSIB in December via cutting wrists with pocket knife, current medication regimen appears to be Celexa 20 mg qd and Remeron 7.5 mg qhs (self reports compliance), no significant legal or family history, substance history significant for tobacco use, admitted to [facility] due to left femoral fracture. Behavioral Health consulted for depression.     Patient seen and assessed at bedside. He presents as tired-appearing with poor eye contact. He reports low mood for the past month due to medical problems, stating "I'm tired of laying in bed all day." He states "sometimes, I just want to quit the fight." He denies suicidal intent or plan, states "I would never do it, I'm Congregational, I don't want to go to hell." He reports poor appetite, anhedonia, poor sleep, and day time fatigue. He reports low mood exacerbated by recent fall and left hip fracture. He denies HI/AVH. No symptoms of kasie elicited. He denies prior suicide attempt, admits to non-suicidal self-injurious behavior via cutting of wrists during past hospitalization in December. He states that he couldn't breathe, and cut his wrist with a pocket knife to get medical attention. He refused to show wrists at this time, states that he did require sutures.     With patient's verbal consent, spoke with pt's son Stepan, ([phone]), for safety assessment and collateral information. He states that since this hospitalization, pt has been "really down" and does not want to go to [facility] as he has been to multiple facilities over the past year. He denies safety concerns or risk of imminent danger at this time. Denies SI/SA in past. States 3 years ago, pt cut fistula out of his arm while in [place], states that he was short of breath and did it for immediate medical attention. He states he has never intentionally injured himself in the past prior to this. Denies firearms present in the home.  Pt is 78 year old male, , domiciled in [place], [place] with son and daughter in law, retired (receives SSI), past medical history of ESRD (dialysis MWF), COPD (on home O2), CAD, HTN, Hypothyroidism and past psychiatric history of anxiety and depression, no current psychiatric OPP, no prior inpatient psychiatric hospitalizations, no history of SI/SA, history of NSSIB in December via cutting wrists with pocket knife, current medication regimen appears to be Celexa 20 mg qd and Remeron 7.5 mg qhs (self reports compliance), no significant legal or family history, substance history significant for tobacco use, admitted to [facility] due to left femoral fracture. Behavioral Health consulted for depression.     Patient seen and assessed at bedside. He presents as tired-appearing with poor eye contact. He reports low mood for the past month due to medical problems, stating "I'm tired of laying in bed all day." He states "sometimes, I just want to quit the fight." He denies suicidal intent or plan, states "I would never do it, I'm Moravian, I don't want to go to hell." He reports poor appetite, anhedonia, poor sleep, and day time fatigue. He reports low mood exacerbated by recent fall and left hip fracture. He denies HI/AVH. No symptoms of kasie elicited. He denies prior suicide attempt, admits to non-suicidal self-injurious behavior via cutting of wrists during past hospitalization in December. He states that he couldn't breathe, and cut his wrist with a pocket knife to get medical attention. He refused to show wrists at this time, states that he did require sutures.     With patient's verbal consent, spoke with pt's son Stepan, ([phone]), for safety assessment and collateral information. He states that since this hospitalization, pt has been "really down" and does not want to go to [facility] as he has been to multiple facilities over the past year. He denies safety concerns or risk of imminent danger at this time. Denies SI/SA in past. States 3 years ago, pt cut fistula out of his arm while in [place], states that he was short of breath and did it for immediate medical attention. He states he has never intentionally injured himself in the past prior to this. Denies firearms present in the home.     Patient seen later in the afternoon, stating that he is feeling somewhat better. Is reactive in affect. Currently denies SI/HI/AVH at this time.

## 2023-10-17 NOTE — BH CONSULTATION LIAISON ASSESSMENT NOTE - FAMILY DETAILS
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 The surgical margins are positive for tumor cells.

## 2023-10-17 NOTE — BH CONSULTATION LIAISON ASSESSMENT NOTE - NSBHCHARTREVIEWLAB_PSY_A_CORE FT
10-17    137  |  100  |  36.8<H>  ----------------------------<  79  4.5   |  28.0  |  4.97<H>    Ca    8.6      17 Oct 2023 04:42

## 2023-10-17 NOTE — BH CONSULTATION LIAISON ASSESSMENT NOTE - CURRENT MEDICATION
MEDICATIONS  (STANDING):  acetaminophen     Tablet .. 975 milliGRAM(s) Oral every 8 hours  apixaban 5 milliGRAM(s) Oral every 12 hours  atorvastatin 40 milliGRAM(s) Oral at bedtime  calcium acetate 667 milliGRAM(s) Oral three times a day with meals  chlorhexidine 2% Cloths 1 Application(s) Topical daily  citalopram 20 milliGRAM(s) Oral daily  hydrALAZINE 50 milliGRAM(s) Oral three times a day  levothyroxine 25 MICROGram(s) Oral daily  melatonin 3 milliGRAM(s) Oral at bedtime  metoprolol succinate  milliGRAM(s) Oral daily  mirtazapine 7.5 milliGRAM(s) Oral at bedtime  pantoprazole    Tablet 40 milliGRAM(s) Oral before breakfast  senna 2 Tablet(s) Oral at bedtime    MEDICATIONS  (PRN):  aluminum hydroxide/magnesium hydroxide/simethicone Suspension 30 milliLiter(s) Oral every 4 hours PRN Dyspepsia  hydrALAZINE Injectable 10 milliGRAM(s) IV Push every 8 hours PRN SBP>170  HYDROmorphone   Tablet 2 milliGRAM(s) Oral every 4 hours PRN Severe Pain (7 - 10)  oxyCODONE    IR 10 milliGRAM(s) Oral every 4 hours PRN Moderate Pain (4 - 6)  oxyCODONE    IR 5 milliGRAM(s) Oral every 4 hours PRN Mild Pain (1 - 3)  polyethylene glycol 3350 17 Gram(s) Oral daily PRN Constipation

## 2023-10-17 NOTE — BH CONSULTATION LIAISON ASSESSMENT NOTE - RISK ASSESSMENT
RF: Age >65, medical co-morbidities, non-suicidal self injurious behavior     PF: compliance with medications, no prior suicide attempt, supportive family

## 2023-10-17 NOTE — PROGRESS NOTE ADULT - SUBJECTIVE AND OBJECTIVE BOX
[facility] Division of Hospital Medicine    Chief Complaint:  Hip fracture     INTERVAL HISTORY:  Pt examined lying in bed  Still makes statements about not seeing the point to life   Denies CP, SOB, abd pain, N/V      MEDICATIONS  (STANDING):  acetaminophen     Tablet .. 975 milliGRAM(s) Oral every 8 hours  apixaban 5 milliGRAM(s) Oral every 12 hours  atorvastatin 40 milliGRAM(s) Oral at bedtime  calcium acetate 667 milliGRAM(s) Oral three times a day with meals  chlorhexidine 2% Cloths 1 Application(s) Topical daily  citalopram 20 milliGRAM(s) Oral daily  hydrALAZINE 50 milliGRAM(s) Oral three times a day  levothyroxine 25 MICROGram(s) Oral daily  melatonin 3 milliGRAM(s) Oral at bedtime  metoprolol succinate  milliGRAM(s) Oral daily  mirtazapine 7.5 milliGRAM(s) Oral at bedtime  pantoprazole    Tablet 40 milliGRAM(s) Oral before breakfast  senna 2 Tablet(s) Oral at bedtime    MEDICATIONS  (PRN):  aluminum hydroxide/magnesium hydroxide/simethicone Suspension 30 milliLiter(s) Oral every 4 hours PRN Dyspepsia  hydrALAZINE Injectable 10 milliGRAM(s) IV Push every 8 hours PRN SBP>170  HYDROmorphone   Tablet 2 milliGRAM(s) Oral every 4 hours PRN Severe Pain (7 - 10)  oxyCODONE    IR 5 milliGRAM(s) Oral every 4 hours PRN Mild Pain (1 - 3)  oxyCODONE    IR 10 milliGRAM(s) Oral every 4 hours PRN Moderate Pain (4 - 6)  polyethylene glycol 3350 17 Gram(s) Oral daily PRN Constipation      Vital Signs Last 24 Hrs  Vital Signs Last 24 Hrs  T(C): 36.7 (17 Oct 2023 04:10), Max: 37.3 (16 Oct 2023 08:21)  T(F): 98 (17 Oct 2023 04:10), Max: 99.2 (16 Oct 2023 08:21)  HR: 72 (17 Oct 2023 04:10) (66 - 72)  BP: 139/66 (17 Oct 2023 04:10) (120/63 - 151/77)  BP(mean): --  RR: 18 (17 Oct 2023 04:10) (17 - 18)  SpO2: 92% (17 Oct 2023 04:10) (92% - 100%)    Parameters below as of 17 Oct 2023 04:10  Patient On (Oxygen Delivery Method): nasal cannula  O2 Flow (L/min): 2      CONSTITUTIONAL: No apparent distress, appears comfortable  HEENT: Normocephalic, Atraumatic. Trachea midline.  RESPIRATORY: lungs are clear to auscultation bilaterally, no crackles, rhonchi, wheezes  CARDIOVASCULAR: Regular rate and rhythm, normal S1 and S2, no murmur/rub/gallop; No lower extremity edema; Peripheral pulses are 2+ bilaterally  ABDOMEN: Soft, non-distended, nontender to palpation, +BS  MUSCLOSKELETAL: improvement in ROM LLE 2/2 pain, RUE swelling improved  PSYCH: Depressed affect. Suicidal thoughts (passive), no active SI  NEUROLOGY: Alert, Oriented x3, CN 2-12 grossly intact  SKIN: No rashes      LABS:                                   10.1   4.21  )-----------( 224      ( 17 Oct 2023 04:42 )             33.8   10-17    137  |  100  |  36.8<H>  ----------------------------<  79  4.5   |  28.0  |  4.97<H>    Ca    8.6      17 Oct 2023 04:42          Urinalysis Basic - ( 13 Oct 2023 06:48 )    Color: x / Appearance: x / SG: x / pH: x  Gluc: 82 mg/dL / Ketone: x  / Bili: x / Urobili: x   Blood: x / Protein: x / Nitrite: x   Leuk Esterase: x / RBC: x / WBC x   Sq Epi: x / Non Sq Epi: x / Bacteria: x        RADIOLOGY & ADDITIONAL TESTS:

## 2023-10-17 NOTE — BH CONSULTATION LIAISON ASSESSMENT NOTE - DETAILS
Passive suicidal ideation, deterrents include supportive family and Zoroastrian / moral beliefs against suicide

## 2023-10-17 NOTE — BH CONSULTATION LIAISON ASSESSMENT NOTE - SUMMARY
Pt is 78 year old male, , domiciled in [place], [place] with son and daughter in law, retired (receives SSI), past medical history of ESRD (dialysis MWF), COPD (on home O2), CAD, HTN, Hypothyroidism and past psychiatric history of anxiety and depression, no current psychiatric OPP, no prior inpatient psychiatric hospitalizations, no history of SI/SA, history of NSSIB in December via cutting wrists with pocket knife, current medication regimen appears to be Celexa 20 mg qd and Remeron 7.5 mg qhs (self reports compliance), no significant legal or family history, substance history significant for tobacco use, admitted to [facility] due to left femoral fracture. Behavioral Health consulted for depression.     Assessment:  Patient seen and assessed at bedside. Patient presents with depressed mood and affect, poor eye contact, and appears tired. He denies suicidal intent or plan, endorses protective factors include Adventist, moral stance against suicide, and supportive family. He reports poor appetite, anhedonia, poor sleep, and day time fatigue. He states mood symptoms have been exacerbated by recent mechanical fall and left hip fracture.   Pt is 78 year old male, , domiciled in [place], [place] with son and daughter in law, retired (receives SSI), past medical history of ESRD (dialysis MWF), COPD (on home O2), CAD, HTN, Hypothyroidism and past psychiatric history of anxiety and depression, no current psychiatric OPP, no prior inpatient psychiatric hospitalizations, no history of SI/SA, history of NSSIB in December via cutting wrists with pocket knife, current medication regimen appears to be Celexa 20 mg qd and Remeron 7.5 mg qhs (self reports compliance), no significant legal or family history, substance history significant for tobacco use, admitted to [facility] due to left femoral fracture. Behavioral Health consulted for depression.     Assessment:  Patient seen and assessed at bedside. Patient presents with depressed mood and affect, poor eye contact, and appears tired. He denies suicidal intent or plan, endorses protective factors include Buddhist, moral stance against suicide, and supportive family. He reports poor appetite, anhedonia, poor sleep, and day time fatigue. He states mood symptoms have been exacerbated by recent mechanical fall and left hip fracture.     Recs:  - continue Remeron 7.5 mg qhs for depressed mood  - continue Celexa 20 mg qd for depressed mood  Pt is 78 year old male, , domiciled in [place], [place] with son and daughter in law, retired (receives SSI), past medical history of ESRD (dialysis MWF), COPD (on home O2), CAD, HTN, Hypothyroidism and past psychiatric history of anxiety and depression, no current psychiatric OPP, no prior inpatient psychiatric hospitalizations, no history of SI/SA, history of NSSIB in December via cutting wrists with pocket knife, current medication regimen appears to be Celexa 20 mg qd and Remeron 7.5 mg qhs (self reports compliance), no significant legal or family history, substance history significant for tobacco use, admitted to [facility] due to left femoral fracture. Behavioral Health consulted for depression.     Assessment:  Patient seen and assessed at bedside. Patient presents with depressed mood and affect, poor eye contact, and appears tired. He denies suicidal intent or plan, endorses protective factors include Baptist, moral stance against suicide, and supportive family. He reports poor appetite, anhedonia, poor sleep, and day time fatigue. He states mood symptoms have been exacerbated by recent mechanical fall and left hip fracture. Patient seen later in the morning with more reactive affect, intermittently joking and smiling. Recommend to continue Remeron and Celexa.     Recs:  - continue Remeron 7.5 mg qhs for depressed mood  - continue Celexa 20 mg qd for depressed mood   - Psych will sign off

## 2023-10-17 NOTE — BH CONSULTATION LIAISON ASSESSMENT NOTE - NSBHCHARTREVIEWVS_PSY_A_CORE FT
Vital Signs Last 24 Hrs  T(C): 36.8 (17 Oct 2023 09:54), Max: 37.2 (16 Oct 2023 12:02)  T(F): 98.3 (17 Oct 2023 09:54), Max: 99 (16 Oct 2023 12:02)  HR: 67 (17 Oct 2023 09:54) (66 - 72)  BP: 124/57 (17 Oct 2023 09:54) (120/63 - 151/77)  BP(mean): --  RR: 18 (17 Oct 2023 09:54) (17 - 18)  SpO2: 93% (17 Oct 2023 09:54) (92% - 100%)    Parameters below as of 17 Oct 2023 09:54  Patient On (Oxygen Delivery Method): nasal cannula  O2 Flow (L/min): 2

## 2023-10-17 NOTE — PROGRESS NOTE ADULT - ASSESSMENT
78-year-old male with depression, HLD, HTN, CAD s/p stent, COPD, DVT, ESRD on hemodialysis and history of attempted cut off his AV fistula for suicidal ideation now s/p AV fistula ligation and left chest tunneled dialysis catheter presented with fall and found to have left femoral intertrochanteric fracture.  Nephrology is consulted for resumption of hemodialysis needs.    ESRD on HD  -Access: L chest tunneled dialysis catheter  -Outpatient dialysis unit is at [facility]  -Outpatient dialysis days are Mondays/ Wednesdays/ Fridays  -Last dialysis yesterday.  No need of hemodialysis today.    HTN - BP stable, continue home meds.  -Volume Status - UF as tolerated during hemodialysis   -Anemia - hemoglobin is at goal; no indication for NORAH at this time  -Mineral Bone Disease - continue phos binders.  -Left femoral intertrochanteric fracture -s/p surgical fixation. IN for left FIT.  -Acute DVT–started on Eliquis.

## 2023-10-17 NOTE — BH CONSULTATION LIAISON ASSESSMENT NOTE - NSSUICPROTFACT_PSY_ALL_CORE
Supportive social network of family or friends/Fear of death or the actual act of killing self/Cultural, spiritual and/or moral attitudes against suicide/Christian beliefs

## 2023-10-17 NOTE — BH CONSULTATION LIAISON ASSESSMENT NOTE - NSBHCHARTREVIEWINVESTIGATE_PSY_A_CORE FT
Ventricular Rate 73 BPM    Atrial Rate 73 BPM    P-R Interval 160 ms    QRS Duration 82 ms    Q-T Interval 394 ms    QTC Calculation(Bazett) 434 ms    P Axis 85 degrees    R Axis 76 degrees    T Axis 74 degrees    Diagnosis Line *** Poor data quality, interpretation may be adversely affected  Normal sinus rhythm  Anterior infarct , age undetermined  Abnormal ECG    Confirmed by KATIE WELLS ([id number]) on 10/9/2023 9:05:53 AM

## 2023-10-18 LAB
ANION GAP SERPL CALC-SCNC: 11 MMOL/L — SIGNIFICANT CHANGE UP (ref 5–17)
ANION GAP SERPL CALC-SCNC: 11 MMOL/L — SIGNIFICANT CHANGE UP (ref 5–17)
BUN SERPL-MCNC: 54.7 MG/DL — HIGH (ref 8–20)
BUN SERPL-MCNC: 54.7 MG/DL — HIGH (ref 8–20)
CALCIUM SERPL-MCNC: 8.8 MG/DL — SIGNIFICANT CHANGE UP (ref 8.4–10.5)
CALCIUM SERPL-MCNC: 8.8 MG/DL — SIGNIFICANT CHANGE UP (ref 8.4–10.5)
CHLORIDE SERPL-SCNC: 99 MMOL/L — SIGNIFICANT CHANGE UP (ref 96–108)
CHLORIDE SERPL-SCNC: 99 MMOL/L — SIGNIFICANT CHANGE UP (ref 96–108)
CO2 SERPL-SCNC: 28 MMOL/L — SIGNIFICANT CHANGE UP (ref 22–29)
CO2 SERPL-SCNC: 28 MMOL/L — SIGNIFICANT CHANGE UP (ref 22–29)
CREAT SERPL-MCNC: 6.43 MG/DL — HIGH (ref 0.5–1.3)
CREAT SERPL-MCNC: 6.43 MG/DL — HIGH (ref 0.5–1.3)
EGFR: 8 ML/MIN/1.73M2 — LOW
EGFR: 8 ML/MIN/1.73M2 — LOW
GLUCOSE SERPL-MCNC: 138 MG/DL — HIGH (ref 70–99)
GLUCOSE SERPL-MCNC: 138 MG/DL — HIGH (ref 70–99)
HCT VFR BLD CALC: 30.1 % — LOW (ref 39–50)
HCT VFR BLD CALC: 30.1 % — LOW (ref 39–50)
HGB BLD-MCNC: 9.2 G/DL — LOW (ref 13–17)
HGB BLD-MCNC: 9.2 G/DL — LOW (ref 13–17)
MCHC RBC-ENTMCNC: 30.6 GM/DL — LOW (ref 32–36)
MCHC RBC-ENTMCNC: 30.6 GM/DL — LOW (ref 32–36)
MCHC RBC-ENTMCNC: 31.2 PG — SIGNIFICANT CHANGE UP (ref 27–34)
MCHC RBC-ENTMCNC: 31.2 PG — SIGNIFICANT CHANGE UP (ref 27–34)
MCV RBC AUTO: 102 FL — HIGH (ref 80–100)
MCV RBC AUTO: 102 FL — HIGH (ref 80–100)
PLATELET # BLD AUTO: 222 K/UL — SIGNIFICANT CHANGE UP (ref 150–400)
PLATELET # BLD AUTO: 222 K/UL — SIGNIFICANT CHANGE UP (ref 150–400)
POTASSIUM SERPL-MCNC: 4.5 MMOL/L — SIGNIFICANT CHANGE UP (ref 3.5–5.3)
POTASSIUM SERPL-MCNC: 4.5 MMOL/L — SIGNIFICANT CHANGE UP (ref 3.5–5.3)
POTASSIUM SERPL-SCNC: 4.5 MMOL/L — SIGNIFICANT CHANGE UP (ref 3.5–5.3)
POTASSIUM SERPL-SCNC: 4.5 MMOL/L — SIGNIFICANT CHANGE UP (ref 3.5–5.3)
RBC # BLD: 2.95 M/UL — LOW (ref 4.2–5.8)
RBC # BLD: 2.95 M/UL — LOW (ref 4.2–5.8)
RBC # FLD: 16.2 % — HIGH (ref 10.3–14.5)
RBC # FLD: 16.2 % — HIGH (ref 10.3–14.5)
SODIUM SERPL-SCNC: 138 MMOL/L — SIGNIFICANT CHANGE UP (ref 135–145)
SODIUM SERPL-SCNC: 138 MMOL/L — SIGNIFICANT CHANGE UP (ref 135–145)
WBC # BLD: 4.52 K/UL — SIGNIFICANT CHANGE UP (ref 3.8–10.5)
WBC # BLD: 4.52 K/UL — SIGNIFICANT CHANGE UP (ref 3.8–10.5)
WBC # FLD AUTO: 4.52 K/UL — SIGNIFICANT CHANGE UP (ref 3.8–10.5)
WBC # FLD AUTO: 4.52 K/UL — SIGNIFICANT CHANGE UP (ref 3.8–10.5)

## 2023-10-18 PROCEDURE — 90935 HEMODIALYSIS ONE EVALUATION: CPT

## 2023-10-18 PROCEDURE — 99232 SBSQ HOSP IP/OBS MODERATE 35: CPT

## 2023-10-18 RX ORDER — MIRTAZAPINE 45 MG/1
1 TABLET, ORALLY DISINTEGRATING ORAL
Qty: 0 | Refills: 0 | DISCHARGE
Start: 2023-10-18

## 2023-10-18 RX ORDER — SENNA PLUS 8.6 MG/1
2 TABLET ORAL
Qty: 0 | Refills: 0 | DISCHARGE
Start: 2023-10-18

## 2023-10-18 RX ORDER — POLYETHYLENE GLYCOL 3350 17 G/17G
17 POWDER, FOR SOLUTION ORAL
Qty: 0 | Refills: 0 | DISCHARGE
Start: 2023-10-18

## 2023-10-18 RX ORDER — MIRTAZAPINE 45 MG/1
1 TABLET, ORALLY DISINTEGRATING ORAL
Qty: 0 | Refills: 0 | DISCHARGE

## 2023-10-18 RX ORDER — METOPROLOL TARTRATE 50 MG
1 TABLET ORAL
Qty: 0 | Refills: 0 | DISCHARGE

## 2023-10-18 RX ORDER — CALCIUM ACETATE 667 MG
1 TABLET ORAL
Qty: 0 | Refills: 0 | DISCHARGE
Start: 2023-10-18

## 2023-10-18 RX ORDER — APIXABAN 2.5 MG/1
1 TABLET, FILM COATED ORAL
Qty: 0 | Refills: 0 | DISCHARGE
Start: 2023-10-18

## 2023-10-18 RX ORDER — OXYCODONE HYDROCHLORIDE 5 MG/1
1 TABLET ORAL
Qty: 12 | Refills: 0
Start: 2023-10-18 | End: 2023-10-20

## 2023-10-18 RX ORDER — METOPROLOL TARTRATE 50 MG
1 TABLET ORAL
Qty: 0 | Refills: 0 | DISCHARGE
Start: 2023-10-18

## 2023-10-18 RX ORDER — ATORVASTATIN CALCIUM 80 MG/1
1 TABLET, FILM COATED ORAL
Qty: 0 | Refills: 0 | DISCHARGE
Start: 2023-10-18

## 2023-10-18 RX ORDER — ONDANSETRON 8 MG/1
4 TABLET, FILM COATED ORAL ONCE
Refills: 0 | Status: COMPLETED | OUTPATIENT
Start: 2023-10-18 | End: 2023-10-18

## 2023-10-18 RX ADMIN — Medication 975 MILLIGRAM(S): at 05:08

## 2023-10-18 RX ADMIN — CHLORHEXIDINE GLUCONATE 1 APPLICATION(S): 213 SOLUTION TOPICAL at 11:51

## 2023-10-18 RX ADMIN — ONDANSETRON 4 MILLIGRAM(S): 8 TABLET, FILM COATED ORAL at 22:15

## 2023-10-18 RX ADMIN — Medication 3 MILLIGRAM(S): at 22:20

## 2023-10-18 RX ADMIN — Medication 25 MICROGRAM(S): at 05:08

## 2023-10-18 RX ADMIN — CITALOPRAM 20 MILLIGRAM(S): 10 TABLET, FILM COATED ORAL at 11:50

## 2023-10-18 RX ADMIN — Medication 975 MILLIGRAM(S): at 22:19

## 2023-10-18 RX ADMIN — Medication 667 MILLIGRAM(S): at 17:28

## 2023-10-18 RX ADMIN — MIRTAZAPINE 7.5 MILLIGRAM(S): 45 TABLET, ORALLY DISINTEGRATING ORAL at 22:20

## 2023-10-18 RX ADMIN — Medication 975 MILLIGRAM(S): at 06:00

## 2023-10-18 RX ADMIN — ATORVASTATIN CALCIUM 40 MILLIGRAM(S): 80 TABLET, FILM COATED ORAL at 22:20

## 2023-10-18 RX ADMIN — SENNA PLUS 2 TABLET(S): 8.6 TABLET ORAL at 22:20

## 2023-10-18 RX ADMIN — Medication 50 MILLIGRAM(S): at 17:28

## 2023-10-18 RX ADMIN — Medication 50 MILLIGRAM(S): at 05:08

## 2023-10-18 RX ADMIN — APIXABAN 5 MILLIGRAM(S): 2.5 TABLET, FILM COATED ORAL at 17:28

## 2023-10-18 RX ADMIN — PANTOPRAZOLE SODIUM 40 MILLIGRAM(S): 20 TABLET, DELAYED RELEASE ORAL at 05:08

## 2023-10-18 RX ADMIN — Medication 667 MILLIGRAM(S): at 07:58

## 2023-10-18 RX ADMIN — Medication 975 MILLIGRAM(S): at 23:19

## 2023-10-18 RX ADMIN — Medication 667 MILLIGRAM(S): at 11:51

## 2023-10-18 RX ADMIN — Medication 50 MILLIGRAM(S): at 22:20

## 2023-10-18 RX ADMIN — Medication 100 MILLIGRAM(S): at 05:08

## 2023-10-18 RX ADMIN — APIXABAN 5 MILLIGRAM(S): 2.5 TABLET, FILM COATED ORAL at 05:08

## 2023-10-18 NOTE — DIETITIAN INITIAL EVALUATION ADULT - PERTINENT LABORATORY DATA
10-18    138  |  99  |  54.7<H>  ----------------------------<  138<H>  4.5   |  28.0  |  6.43<H>    Ca    8.8      18 Oct 2023 06:32

## 2023-10-18 NOTE — DIETITIAN INITIAL EVALUATION ADULT - OTHER INFO
78yoM w/ COPD on 2L home O2, HTN, Hypothyroidism, HTN, CAD s/p PCI, ESRD on HD MWF, LLE DVT (2/23) no longer on AC admitted with left intertrochanteric femoral fracture.  L. peroneal DVT (2/2023) no longer on Eliquis who presents to [facility] ED s/p mechanical fall. Found to have Nondisplaced L. intertrochanteric femur fx, s/p IMN of the left hip. Of note found to have a RUE DVT     L. Femoral nondisplaced intertrochanteric fracture   s/p Mechanical fall

## 2023-10-18 NOTE — PROGRESS NOTE ADULT - ASSESSMENT
78yoM w/ COPD on 2L home O2, HTN, Hypothyroidism, HTN, CAD s/p PCI, ESRD on HD MWF, LLE DVT (2/23) no longer on AC admitted with left intertrochanteric femoral fracture.  L. peroneal DVT (2/2023) no longer on Eliquis who presents to [facility] ED s/p mechanical fall. Found to have Nondisplaced L. intertrochanteric femur fx, s/p IMN of the left hip. Of note found to have a RUE DVT     L. Femoral nondisplaced intertrochanteric fracture   s/p Mechanical fall   Analgesia PRN  Incentive Spirometry  PT evaluation: VIVIANA MICHELLE pending HepB S Ab     Anemia  Drop in H/H indicative of intraprocedural loss   Stable    Acute DVT   h/o LLE DVT, now with RUE DVT.   Reports he thinks there was an IV line previously near AC fossa  Eliquis had been stopped as f/u LE Doppler neg per chart  Given prior h/o clot case d/w Heme.   Recommend that pt be treated for acute DVT for 3-6 months but will not need lifelong AC given location and precipitating factor   Started back on Eliquis 5 mg bid (x 7 days followed by 2.5 mg bid thereafter)    ESRD on HD   Nephrology following, continues on MWF HD,  HD as per schedule    COPD on Home Oxygen   Not in exacerbation, currently saturating well on 2L NC (baseline)   Supplemental O2  Nebs PRN / Pulmicort     CAD  s/p PCI in past, no longer on DAPT   continue ASA, Lipitor, Toprol     HTN Urgency  resolved     Hypothyroidism   Levothyroxine 25 mcg     Anxiety/Depression   Mirtazapine, citalopram   h/o suicidal attempt with self mutilation of AVF  With passive suicidal thoughts (not plan) psych consulted     Dispo:  VIVIANA with HD

## 2023-10-18 NOTE — PROGRESS NOTE ADULT - PROVIDER SPECIALTY LIST ADULT
Internal Medicine
Internal Medicine
Nephrology
Internal Medicine
Nephrology
Nephrology
Orthopedics
Orthopedics
Hospitalist
Hospitalist
Internal Medicine
Nephrology
Orthopedics
Orthopedics
Hospitalist
Hospitalist
Internal Medicine
Internal Medicine

## 2023-10-18 NOTE — DIETITIAN INITIAL EVALUATION ADULT - PERTINENT MEDS FT
MEDICATIONS  (STANDING):  acetaminophen     Tablet .. 975 milliGRAM(s) Oral every 8 hours  apixaban 5 milliGRAM(s) Oral every 12 hours  atorvastatin 40 milliGRAM(s) Oral at bedtime  calcium acetate 667 milliGRAM(s) Oral three times a day with meals  chlorhexidine 2% Cloths 1 Application(s) Topical daily  citalopram 20 milliGRAM(s) Oral daily  hydrALAZINE 50 milliGRAM(s) Oral three times a day  levothyroxine 25 MICROGram(s) Oral daily  melatonin 3 milliGRAM(s) Oral at bedtime  metoprolol succinate  milliGRAM(s) Oral daily  mirtazapine 7.5 milliGRAM(s) Oral at bedtime  pantoprazole    Tablet 40 milliGRAM(s) Oral before breakfast  senna 2 Tablet(s) Oral at bedtime    MEDICATIONS  (PRN):  aluminum hydroxide/magnesium hydroxide/simethicone Suspension 30 milliLiter(s) Oral every 4 hours PRN Dyspepsia  hydrALAZINE Injectable 10 milliGRAM(s) IV Push every 8 hours PRN SBP>170  polyethylene glycol 3350 17 Gram(s) Oral daily PRN Constipation

## 2023-10-18 NOTE — PROGRESS NOTE ADULT - SUBJECTIVE AND OBJECTIVE BOX
Reason for visit: ESRD    Subjective: Patient with no new complaints.  Denies shortness of breath, chest pain. Seen on hemodialysis.    Review of systems: All systems were reviewed in detail, pertinent positive and negative have been mentioned above, otherwise negative.    Gen: NAD, well-appearing; obese  HEENT: Supple neck, MMM  Pulm: CTA  CV: RRR, no rub  Back: No spinal or CVA tenderness  Abd: +BS, soft, nontender/nondistended  LE: Warm, no edema  Neuro: No focal deficits  MSK: LLE dressing, CDI.  Skin: Warm, without rashes  Access: Left chest tunneled hemodialysis catheter    =======================================================  Vital Signs Last 24 Hrs  T(C): 36.7 (18 Oct 2023 12:36), Max: 36.9 (17 Oct 2023 20:57)  T(F): 98.1 (18 Oct 2023 12:36), Max: 98.4 (17 Oct 2023 20:57)  HR: 80 (18 Oct 2023 12:36) (73 - 83)  BP: 156/85 (18 Oct 2023 12:36) (132/69 - 156/85)  BP(mean): --  RR: 18 (18 Oct 2023 12:36) (18 - 18)  SpO2: 99% (18 Oct 2023 12:36) (91% - 99%)    Parameters below as of 18 Oct 2023 12:36  Patient On (Oxygen Delivery Method): nasal cannula      I&O's Summary    17 Oct 2023 07:01  -  18 Oct 2023 07:00  --------------------------------------------------------  IN: 230 mL / OUT: 200 mL / NET: 30 mL      =======================================================  Current Antibiotics:    Other medications:  acetaminophen     Tablet .. 975 milliGRAM(s) Oral every 8 hours  apixaban 5 milliGRAM(s) Oral every 12 hours  atorvastatin 40 milliGRAM(s) Oral at bedtime  calcium acetate 667 milliGRAM(s) Oral three times a day with meals  chlorhexidine 2% Cloths 1 Application(s) Topical daily  citalopram 20 milliGRAM(s) Oral daily  hydrALAZINE 50 milliGRAM(s) Oral three times a day  levothyroxine 25 MICROGram(s) Oral daily  melatonin 3 milliGRAM(s) Oral at bedtime  metoprolol succinate  milliGRAM(s) Oral daily  mirtazapine 7.5 milliGRAM(s) Oral at bedtime  pantoprazole    Tablet 40 milliGRAM(s) Oral before breakfast  senna 2 Tablet(s) Oral at bedtime    =======================================================  10-18    138  |  99  |  54.7<H>  ----------------------------<  138<H>  4.5   |  28.0  |  6.43<H>    Ca    8.8      18 Oct 2023 06:32      Creatinine: 6.43 mg/dL (10-18-23 @ 06:32)  Creatinine: 4.97 mg/dL (10-17-23 @ 04:42)  Creatinine: 6.70 mg/dL (10-16-23 @ 04:44)  Creatinine: 5.75 mg/dL (10-15-23 @ 04:01)  Creatinine: 4.32 mg/dL (10-14-23 @ 07:19)    =======================================================

## 2023-10-18 NOTE — PROGRESS NOTE ADULT - ASSESSMENT
78-year-old male with depression, HLD, HTN, CAD s/p stent, COPD, DVT, ESRD on hemodialysis and history of attempted cut off his AV fistula for suicidal ideation now s/p AV fistula ligation and left chest tunneled dialysis catheter presented with fall and found to have left femoral intertrochanteric fracture.  Nephrology is consulted for resumption of hemodialysis needs.    ESRD on HD  -Access: L chest tunneled dialysis catheter  -Outpatient dialysis unit is at [facility]  -Outpatient dialysis days are Mondays/ Wednesdays/ Fridays  Seen on HD.  Qd, Qb, UF rate reviewed.  Vitals stable.  Access working well.  Patient tolerating HD well.    HTN - BP stable, continue home meds.  -Volume Status - UF as tolerated during hemodialysis   -Anemia - hemoglobin is at goal; no indication for NORAH at this time  -Mineral Bone Disease - continue phos binders.  -Left femoral intertrochanteric fracture -s/p surgical fixation. IN for left FIT.  -Acute DVT–started on Eliquis.

## 2023-10-18 NOTE — PROGRESS NOTE ADULT - REASON FOR ADMISSION
L. Femur Intertrochanteric Fracture

## 2023-10-18 NOTE — PROGRESS NOTE ADULT - SUBJECTIVE AND OBJECTIVE BOX
[facility] Division of Hospital Medicine    Chief Complaint:  Hip fracture     INTERVAL HISTORY:  Pt examined lying in bed  Awaiting VIVIANA acceptance  Denies CP, SOB, abd pain, N/V      MEDICATIONS  (STANDING):  acetaminophen     Tablet .. 975 milliGRAM(s) Oral every 8 hours  apixaban 5 milliGRAM(s) Oral every 12 hours  atorvastatin 40 milliGRAM(s) Oral at bedtime  calcium acetate 667 milliGRAM(s) Oral three times a day with meals  chlorhexidine 2% Cloths 1 Application(s) Topical daily  citalopram 20 milliGRAM(s) Oral daily  hydrALAZINE 50 milliGRAM(s) Oral three times a day  levothyroxine 25 MICROGram(s) Oral daily  melatonin 3 milliGRAM(s) Oral at bedtime  metoprolol succinate  milliGRAM(s) Oral daily  mirtazapine 7.5 milliGRAM(s) Oral at bedtime  pantoprazole    Tablet 40 milliGRAM(s) Oral before breakfast  senna 2 Tablet(s) Oral at bedtime    MEDICATIONS  (PRN):  aluminum hydroxide/magnesium hydroxide/simethicone Suspension 30 milliLiter(s) Oral every 4 hours PRN Dyspepsia  hydrALAZINE Injectable 10 milliGRAM(s) IV Push every 8 hours PRN SBP>170  HYDROmorphone   Tablet 2 milliGRAM(s) Oral every 4 hours PRN Severe Pain (7 - 10)  oxyCODONE    IR 5 milliGRAM(s) Oral every 4 hours PRN Mild Pain (1 - 3)  oxyCODONE    IR 10 milliGRAM(s) Oral every 4 hours PRN Moderate Pain (4 - 6)  polyethylene glycol 3350 17 Gram(s) Oral daily PRN Constipation      Vital Signs Last 24 Hrs  Vital Signs Last 24 Hrs  T(C): 36.7 (18 Oct 2023 12:36), Max: 36.9 (17 Oct 2023 20:57)  T(F): 98.1 (18 Oct 2023 12:36), Max: 98.4 (17 Oct 2023 20:57)  HR: 80 (18 Oct 2023 12:36) (73 - 83)  BP: 156/85 (18 Oct 2023 12:36) (132/69 - 156/85)  BP(mean): --  RR: 18 (18 Oct 2023 12:36) (18 - 18)  SpO2: 99% (18 Oct 2023 12:36) (91% - 99%)    Parameters below as of 18 Oct 2023 12:36  Patient On (Oxygen Delivery Method): nasal cannula        CONSTITUTIONAL: No apparent distress, appears comfortable  HEENT: Normocephalic, Atraumatic. Trachea midline.  RESPIRATORY: lungs are clear to auscultation bilaterally, no crackles, rhonchi, wheezes  CARDIOVASCULAR: Regular rate and rhythm, normal S1 and S2, no murmur/rub/gallop; No lower extremity edema; Peripheral pulses are 2+ bilaterally  ABDOMEN: Soft, non-distended, nontender to palpation, +BS  MUSCLOSKELETAL: improvement in ROM LLE 2/2 pain, RUE swelling improved  PSYCH: Depressed affect. Suicidal thoughts (passive), no active SI  NEUROLOGY: Alert, Oriented x3, CN 2-12 grossly intact  SKIN: No rashes      LABS:                        9.2    4.52  )-----------( 222      ( 18 Oct 2023 06:32 )             30.1   10-18    138  |  99  |  54.7<H>  ----------------------------<  138<H>  4.5   |  28.0  |  6.43<H>    Ca    8.8      18 Oct 2023 06:32        Urinalysis Basic - ( 13 Oct 2023 06:48 )    Color: x / Appearance: x / SG: x / pH: x  Gluc: 82 mg/dL / Ketone: x  / Bili: x / Urobili: x   Blood: x / Protein: x / Nitrite: x   Leuk Esterase: x / RBC: x / WBC x   Sq Epi: x / Non Sq Epi: x / Bacteria: x        RADIOLOGY & ADDITIONAL TESTS:

## 2023-10-18 NOTE — DIETITIAN INITIAL EVALUATION ADULT - NS FNS DIET ORDER
Diet, Regular (10-10-23 @ 11:13)  Diet, Full Liquid (10-10-23 @ 08:00)  Diet, Clear Liquid (10-10-23 @ 08:00)

## 2023-10-19 VITALS
HEART RATE: 70 BPM | TEMPERATURE: 98 F | DIASTOLIC BLOOD PRESSURE: 67 MMHG | SYSTOLIC BLOOD PRESSURE: 138 MMHG | RESPIRATION RATE: 18 BRPM | OXYGEN SATURATION: 96 %

## 2023-10-19 LAB
HBV SURFACE AB SER-ACNC: REACTIVE
HBV SURFACE AB SER-ACNC: REACTIVE
HCT VFR BLD CALC: 32.6 % — LOW (ref 39–50)
HCT VFR BLD CALC: 32.6 % — LOW (ref 39–50)
HGB BLD-MCNC: 9.6 G/DL — LOW (ref 13–17)
HGB BLD-MCNC: 9.6 G/DL — LOW (ref 13–17)
MCHC RBC-ENTMCNC: 29.4 GM/DL — LOW (ref 32–36)
MCHC RBC-ENTMCNC: 29.4 GM/DL — LOW (ref 32–36)
MCHC RBC-ENTMCNC: 30.1 PG — SIGNIFICANT CHANGE UP (ref 27–34)
MCHC RBC-ENTMCNC: 30.1 PG — SIGNIFICANT CHANGE UP (ref 27–34)
MCV RBC AUTO: 102.2 FL — HIGH (ref 80–100)
MCV RBC AUTO: 102.2 FL — HIGH (ref 80–100)
PLATELET # BLD AUTO: 264 K/UL — SIGNIFICANT CHANGE UP (ref 150–400)
PLATELET # BLD AUTO: 264 K/UL — SIGNIFICANT CHANGE UP (ref 150–400)
RBC # BLD: 3.19 M/UL — LOW (ref 4.2–5.8)
RBC # BLD: 3.19 M/UL — LOW (ref 4.2–5.8)
RBC # FLD: 15.9 % — HIGH (ref 10.3–14.5)
RBC # FLD: 15.9 % — HIGH (ref 10.3–14.5)
WBC # BLD: 6.14 K/UL — SIGNIFICANT CHANGE UP (ref 3.8–10.5)
WBC # BLD: 6.14 K/UL — SIGNIFICANT CHANGE UP (ref 3.8–10.5)
WBC # FLD AUTO: 6.14 K/UL — SIGNIFICANT CHANGE UP (ref 3.8–10.5)
WBC # FLD AUTO: 6.14 K/UL — SIGNIFICANT CHANGE UP (ref 3.8–10.5)

## 2023-10-19 PROCEDURE — 99239 HOSP IP/OBS DSCHRG MGMT >30: CPT

## 2023-10-19 RX ORDER — ONDANSETRON 8 MG/1
4 TABLET, FILM COATED ORAL EVERY 6 HOURS
Refills: 0 | Status: DISCONTINUED | OUTPATIENT
Start: 2023-10-19 | End: 2023-10-19

## 2023-10-19 RX ORDER — OXYCODONE HYDROCHLORIDE 5 MG/1
5 TABLET ORAL EVERY 6 HOURS
Refills: 0 | Status: DISCONTINUED | OUTPATIENT
Start: 2023-10-19 | End: 2023-10-19

## 2023-10-19 RX ORDER — APIXABAN 2.5 MG/1
5 TABLET, FILM COATED ORAL EVERY 12 HOURS
Refills: 0 | Status: DISCONTINUED | OUTPATIENT
Start: 2023-10-19 | End: 2023-10-19

## 2023-10-19 RX ADMIN — Medication 975 MILLIGRAM(S): at 06:15

## 2023-10-19 RX ADMIN — Medication 975 MILLIGRAM(S): at 05:15

## 2023-10-19 RX ADMIN — Medication 50 MILLIGRAM(S): at 15:05

## 2023-10-19 RX ADMIN — Medication 100 MILLIGRAM(S): at 05:15

## 2023-10-19 RX ADMIN — CHLORHEXIDINE GLUCONATE 1 APPLICATION(S): 213 SOLUTION TOPICAL at 11:38

## 2023-10-19 RX ADMIN — Medication 975 MILLIGRAM(S): at 15:05

## 2023-10-19 RX ADMIN — Medication 667 MILLIGRAM(S): at 09:00

## 2023-10-19 RX ADMIN — Medication 667 MILLIGRAM(S): at 11:38

## 2023-10-19 RX ADMIN — CITALOPRAM 20 MILLIGRAM(S): 10 TABLET, FILM COATED ORAL at 11:38

## 2023-10-19 RX ADMIN — PANTOPRAZOLE SODIUM 40 MILLIGRAM(S): 20 TABLET, DELAYED RELEASE ORAL at 05:16

## 2023-10-19 RX ADMIN — ONDANSETRON 4 MILLIGRAM(S): 8 TABLET, FILM COATED ORAL at 08:56

## 2023-10-19 RX ADMIN — APIXABAN 5 MILLIGRAM(S): 2.5 TABLET, FILM COATED ORAL at 05:15

## 2023-10-19 RX ADMIN — Medication 50 MILLIGRAM(S): at 05:16

## 2023-10-19 RX ADMIN — OXYCODONE HYDROCHLORIDE 5 MILLIGRAM(S): 5 TABLET ORAL at 15:07

## 2023-10-19 RX ADMIN — Medication 25 MICROGRAM(S): at 05:15

## 2023-10-19 NOTE — DISCHARGE NOTE NURSING/CASE MANAGEMENT/SOCIAL WORK - NSDCPEFALRISK_GEN_ALL_CORE
For information on Fall & Injury Prevention, visit: [email or web address] OR  [email or web address] OR  https://www.cdc.gov/steadi/patient.html

## 2023-10-19 NOTE — DISCHARGE NOTE NURSING/CASE MANAGEMENT/SOCIAL WORK - PATIENT PORTAL LINK FT
You can access the FollowMyHealth Patient Portal offered by [facility] by registering at the following website: [email or web address]. By joining [facility]’s FollowMyHealth portal, you will also be able to view your health information using other applications (apps) compatible with our system.

## 2023-11-13 PROCEDURE — 86900 BLOOD TYPING SEROLOGIC ABO: CPT

## 2023-11-13 PROCEDURE — 82310 ASSAY OF CALCIUM: CPT

## 2023-11-13 PROCEDURE — 85025 COMPLETE CBC W/AUTO DIFF WBC: CPT

## 2023-11-13 PROCEDURE — 96374 THER/PROPH/DIAG INJ IV PUSH: CPT

## 2023-11-13 PROCEDURE — 80048 BASIC METABOLIC PNL TOTAL CA: CPT

## 2023-11-13 PROCEDURE — 73502 X-RAY EXAM HIP UNI 2-3 VIEWS: CPT

## 2023-11-13 PROCEDURE — 93005 ELECTROCARDIOGRAM TRACING: CPT

## 2023-11-13 PROCEDURE — 86901 BLOOD TYPING SEROLOGIC RH(D): CPT

## 2023-11-13 PROCEDURE — 82962 GLUCOSE BLOOD TEST: CPT

## 2023-11-13 PROCEDURE — 87640 STAPH A DNA AMP PROBE: CPT

## 2023-11-13 PROCEDURE — 99261: CPT

## 2023-11-13 PROCEDURE — 73552 X-RAY EXAM OF FEMUR 2/>: CPT

## 2023-11-13 PROCEDURE — 84100 ASSAY OF PHOSPHORUS: CPT

## 2023-11-13 PROCEDURE — 86706 HEP B SURFACE ANTIBODY: CPT

## 2023-11-13 PROCEDURE — 85730 THROMBOPLASTIN TIME PARTIAL: CPT

## 2023-11-13 PROCEDURE — 96375 TX/PRO/DX INJ NEW DRUG ADDON: CPT

## 2023-11-13 PROCEDURE — C1713: CPT

## 2023-11-13 PROCEDURE — 99285 EMERGENCY DEPT VISIT HI MDM: CPT | Mod: 25

## 2023-11-13 PROCEDURE — G1004: CPT

## 2023-11-13 PROCEDURE — 80053 COMPREHEN METABOLIC PANEL: CPT

## 2023-11-13 PROCEDURE — 96376 TX/PRO/DX INJ SAME DRUG ADON: CPT

## 2023-11-13 PROCEDURE — 85027 COMPLETE CBC AUTOMATED: CPT

## 2023-11-13 PROCEDURE — 72192 CT PELVIS W/O DYE: CPT | Mod: MG

## 2023-11-13 PROCEDURE — 70450 CT HEAD/BRAIN W/O DYE: CPT | Mod: MG

## 2023-11-13 PROCEDURE — 76000 FLUOROSCOPY <1 HR PHYS/QHP: CPT

## 2023-11-13 PROCEDURE — 36415 COLL VENOUS BLD VENIPUNCTURE: CPT

## 2023-11-13 PROCEDURE — 93971 EXTREMITY STUDY: CPT

## 2023-11-13 PROCEDURE — C9399: CPT

## 2023-11-13 PROCEDURE — 83970 ASSAY OF PARATHORMONE: CPT

## 2023-11-13 PROCEDURE — 87641 MR-STAPH DNA AMP PROBE: CPT

## 2023-11-13 PROCEDURE — 86850 RBC ANTIBODY SCREEN: CPT

## 2023-11-13 PROCEDURE — 80074 ACUTE HEPATITIS PANEL: CPT

## 2023-11-13 PROCEDURE — 85610 PROTHROMBIN TIME: CPT

## 2023-11-13 PROCEDURE — 97167 OT EVAL HIGH COMPLEX 60 MIN: CPT

## 2023-11-13 PROCEDURE — 71045 X-RAY EXAM CHEST 1 VIEW: CPT

## 2023-11-13 PROCEDURE — 83735 ASSAY OF MAGNESIUM: CPT

## 2025-07-28 NOTE — PATIENT PROFILE ADULT - FALL HARM RISK - HARM RISK INTERVENTIONS
Addended by: RICK VIVEROS on: 7/28/2025 02:51 PM     Modules accepted: Orders     Assistance with ambulation/Assistance OOB with selected safe patient handling equipment/Communicate Risk of Fall with Harm to all staff/Discuss with provider need for PT consult/Monitor gait and stability/Reinforce activity limits and safety measures with patient and family/Tailored Fall Risk Interventions/Visual Cue: Yellow wristband and red socks/Bed in lowest position, wheels locked, appropriate side rails in place/Call bell, personal items and telephone in reach/Instruct patient to call for assistance before getting out of bed or chair/Non-slip footwear when patient is out of bed/[place] to call system/Physically safe environment - no spills, clutter or unnecessary equipment/Purposeful Proactive Rounding/Room/bathroom lighting operational, light cord in reach

## (undated) DEVICE — PACK MINOR WITH LAP

## (undated) DEVICE — VENODYNE/SCD SLEEVE CALF MEDIUM

## (undated) DEVICE — SOL IRR POUR NS 0.9% 1000ML

## (undated) DEVICE — DRAPE C ARM UNIVERSAL

## (undated) DEVICE — GLV 7.5 PROTEXIS (WHITE)

## (undated) DEVICE — GLV 8 PROTEXIS (BLUE)

## (undated) DEVICE — DRSG MEPILEX 10 X 25CM (4 X 10") POST OP AG SILVER

## (undated) DEVICE — DRAPE U LONG 47X70"

## (undated) DEVICE — SUT VICRYL 0 27" CT-2 UNDYED

## (undated) DEVICE — DRILL PILOT LONG 4.0MM

## (undated) DEVICE — DRAPE SHOWER CURTAIN ISOLATION

## (undated) DEVICE — DRAPE TOWEL BLUE 17" X 24"

## (undated) DEVICE — PACK EXTREMITY

## (undated) DEVICE — SOL IRR POUR H2O 1000ML

## (undated) DEVICE — DRSG DERMABOND PRINEO 60CM

## (undated) DEVICE — WARMING BLANKET UPPER ADULT

## (undated) DEVICE — SUT MONOCRYL 2-0 27" SH UNDYED

## (undated) DEVICE — SUT MONOCRYL 3-0 27" PS-2 UNDYED